# Patient Record
Sex: FEMALE | Race: WHITE | NOT HISPANIC OR LATINO | Employment: FULL TIME | URBAN - METROPOLITAN AREA
[De-identification: names, ages, dates, MRNs, and addresses within clinical notes are randomized per-mention and may not be internally consistent; named-entity substitution may affect disease eponyms.]

---

## 2017-01-25 ENCOUNTER — OFFICE VISIT (OUTPATIENT)
Dept: FAMILY MEDICINE | Facility: CLINIC | Age: 52
End: 2017-01-25
Payer: COMMERCIAL

## 2017-01-25 ENCOUNTER — HOSPITAL ENCOUNTER (OUTPATIENT)
Dept: MAMMOGRAPHY | Facility: CLINIC | Age: 52
Discharge: HOME | End: 2017-01-25
Attending: PHYSICIAN ASSISTANT | Admitting: PHYSICIAN ASSISTANT
Payer: COMMERCIAL

## 2017-01-25 VITALS
DIASTOLIC BLOOD PRESSURE: 60 MMHG | HEART RATE: 72 BPM | SYSTOLIC BLOOD PRESSURE: 104 MMHG | TEMPERATURE: 97.8 F | RESPIRATION RATE: 16 BRPM | HEIGHT: 63 IN

## 2017-01-25 DIAGNOSIS — Z12.31 ENCOUNTER FOR SCREENING MAMMOGRAM FOR BREAST CANCER: ICD-10-CM

## 2017-01-25 DIAGNOSIS — Z00.00 ENCOUNTER FOR GENERAL ADULT MEDICAL EXAMINATION WITHOUT ABNORMAL FINDINGS: Primary | ICD-10-CM

## 2017-01-25 DIAGNOSIS — Z86.19 HISTORY OF COLD SORES: ICD-10-CM

## 2017-01-25 DIAGNOSIS — N94.6 DYSMENORRHEA: ICD-10-CM

## 2017-01-25 PROCEDURE — G0202 SCR MAMMO BI INCL CAD: HCPCS

## 2017-01-25 PROCEDURE — 99396 PREV VISIT EST AGE 40-64: CPT | Performed by: PHYSICIAN ASSISTANT

## 2017-01-25 RX ORDER — FAMCICLOVIR 500 MG/1
500 TABLET ORAL 2 TIMES DAILY
Qty: 180 TABLET | Refills: 3 | Status: SHIPPED | OUTPATIENT
Start: 2017-01-25 | End: 2018-02-12

## 2017-01-25 RX ORDER — IBUPROFEN 800 MG/1
800 TABLET, FILM COATED ORAL EVERY 8 HOURS PRN
Qty: 90 TABLET | Refills: 3 | Status: SHIPPED | OUTPATIENT
Start: 2017-01-25 | End: 2018-02-12

## 2017-01-25 ASSESSMENT — PAIN SCALES - GENERAL: PAINLEVEL: NO PAIN (0)

## 2017-01-25 NOTE — MR AVS SNAPSHOT
After Visit Summary   1/25/2017    Lorraine Hunter    MRN: [id number]           Patient Information     Date Of Birth          1965        Visit Information        Provider Department      1/25/2017 2:00 PM Sana Camara PA-C [facility]        Today's Diagnoses     Encounter for general adult medical examination without abnormal findings    -  1     History of cold sores         Encounter for screening mammogram for breast cancer           Care Instructions      Preventive Health Recommendations  Female Ages 50 - 64    Yearly exam: See your health care provider every year in order to  o Review health changes.   o Discuss preventive care.    o Review your medicines if your doctor has prescribed any.      Get a Pap test every three years (unless you have an abnormal result and your provider advises testing more often).    If you get Pap tests with HPV test, you only need to test every 5 years, unless you have an abnormal result.     You do not need a Pap test if your uterus was removed (hysterectomy) and you have not had cancer.    You should be tested each year for STDs (sexually transmitted diseases) if you're at risk.     Have a mammogram every 1 to 2 years.    Have a colonoscopy at age 50, or have a yearly FIT test (stool test). These exams screen for colon cancer.      Have a cholesterol test every 5 years, or more often if advised.    Have a diabetes test (fasting glucose) every three years. If you are at risk for diabetes, you should have this test more often.     If you are at risk for osteoporosis (brittle bone disease), think about having a bone density scan (DEXA).    Shots: Get a flu shot each year. Get a tetanus shot every 10 years.    Nutrition:     Eat at least 5 servings of fruits and vegetables each day.    Eat whole-grain bread, whole-wheat pasta and brown rice instead of white grains and rice.    Talk to your provider about Calcium and Vitamin D.      Lifestyle    Exercise at least 150 minutes a week (30 minutes a day, 5 days a week). This will help you control your weight and prevent disease.    Limit alcohol to one drink per day.    No smoking.     Wear sunscreen to prevent skin cancer.     See your dentist every six months for an exam and cleaning.    See your eye doctor every 1 to 2 years.            Follow-ups after your visit        Your next 10 appointments already scheduled     Jan 25, 2017  2:45 PM   MA SCREENING DIGITAL BILATERAL with PHMA1   [facility])    [address] [place] [id number]   [phone]           Do not use any powder, lotion or deodorant under your arms or on your breast. If you do, we will ask you to remove it before your exam.  Wear comfortable, two-piece clothing.  If you have any allergies, tell your care team.  Bring any previous mammograms from other facilities or have them mailed to the breast center.              Future tests that were ordered for you today     Open Future Orders        Priority Expected Expires Ordered    *MA Screening Digital Bilateral Routine  1/25/2018 1/25/2017            Who to contact     If you have questions or need follow up information about today's clinic visit or your schedule please contact [facility] directly at [phone].  Normal or non-critical lab and imaging results will be communicated to you by MyChart, letter or phone within 4 business days after the clinic has received the results. If you do not hear from us within 7 days, please contact the clinic through [employer] [employer] or phone. If you have a critical or abnormal lab result, we will notify you by phone as soon as possible.  Submit refill requests through [facility] or call your pharmacy and they will forward the refill request to us. Please allow 3 business days for your refill to be completed.          Additional Information About Your Visit        [facility]  "Information     [email or web address] lets you send messages to your doctor, view your test results, renew your prescriptions, schedule appointments and more. To sign up, go to [email or web address] . Click on \"Log in\" on the left side of the screen, which will take you to the Welcome page. Then click on \"Sign up Now\" on the right side of the page.     You will be asked to enter the access code listed below, as well as some personal information. Please follow the directions to create your username and password.     Your access code is: [email or web address]  Expires: 2017  2:35 PM     Your access code will  in 90 days. If you need help or a new code, please call your [place] [facility] clinic or [phone].        Care EveryWhere ID     This is your [facility] EveryWhere ID. This could be used by other organizations to access your [place] [facility] medical records  [id number]        Your Vitals Were     Pulse Temperature Respirations Height          72 97.8  F (36.6  C) (Tympanic) 16 5' 3.2\" (1.605 m)         Blood Pressure from Last 3 Encounters:   17 104/60   10/07/16 147/92   11/05/15 110/60    Weight from Last 3 Encounters:   11/05/15 133 lb (60.328 kg)   13 128 lb (58.06 kg)   13 128 lb (58.06 kg)                 Today's Medication Changes          These changes are accurate as of: 17  2:35 PM.  If you have any questions, ask your nurse or doctor.               These medicines have changed or have updated prescriptions.        Dose/Directions    famciclovir 500 MG tablet   Commonly known as:  FAMVIR   This may have changed:  See the new instructions.   Used for:  History of cold sores   Changed by:  Sana Camara PA-C        Dose:  500 mg   Take 1 tablet (500 mg) by mouth 2 times daily   Quantity:  180 tablet   Refills:  3            Where to get your medicines      These medications were sent to [facility], [place] - [address]  [address] [address]     Phone:  " [phone]    - famciclovir 500 MG tablet             Primary Care Provider Office Phone # Fax #    Sana Camara PA-C [phone]       [address] [place] [address]        Thank you!     Thank you for choosing [facility]  for your care. Our goal is always to provide you with excellent care. Hearing back from our patients is one way we can continue to improve our services. Please take a few minutes to complete the written survey that you may receive in the mail after your visit with us. Thank you!             Your Updated Medication List - Protect others around you: Learn how to safely use, store and throw away your medicines at www.disposemymeds.org.          This list is accurate as of: 1/25/17  2:35 PM.  Always use your most recent med list.                   Brand Name Dispense Instructions for use    famciclovir 500 MG tablet    FAMVIR    180 tablet    Take 1 tablet (500 mg) by mouth 2 times daily       ibuprofen 800 MG tablet    ADVIL/MOTRIN    90 tablet    Take 1 tablet (800 mg) by mouth every 8 hours as needed for moderate pain

## 2017-01-25 NOTE — PROGRESS NOTES
SUBJECTIVE:     CC: Lorraine Hunter is an 51 year old woman who presents for preventive health visit.     Healthy Habits:    Do you get at least three servings of calcium containing foods daily (dairy, green leafy vegetables, etc.)? No    Amount of exercise or daily activities, outside of work: 3-4 day(s) per week    Problems taking medications regularly No    Medication side effects: No    Have you had an eye exam in the past two years? no    Do you see a dentist twice per year? yes    Do you have sleep apnea, excessive snoring or daytime drowsiness?no            Today's PHQ-2 Score:   PHQ-2 ( 1999 [employer]) 1/25/2017 11/5/2015   Q1: Little interest or pleasure in doing things 0 0   Q2: Feeling down, depressed or hopeless 0 0   PHQ-2 Score 0 0       Abuse: Current or Past(Physical, Sexual or Emotional)- No  Do you feel safe in your environment - Yes    Social History   Substance Use Topics     Smoking status: Former Smoker -- 0.50 packs/day     Types: Cigarettes     Quit date: 12/27/2012     Smokeless tobacco: Never Used      Comment: trying Chantix     Alcohol Use: 6.0 oz/week      Comment: 6 drinks per week     The patient does not drink >3 drinks per day nor >7 drinks per week.    Recent Labs   Lab Test  07/31/14   0806  06/18/12   1835   CHOL  193  184   HDL  91  87   LDL  92  85   TRIG  48  64   CHOLHDLRATIO  2.1  2.0       Reviewed orders with patient.  Reviewed health maintenance and updated orders accordingly - Yes    Mammo Decision Support:  Patient over age 50, mutual decision to screen reflected in health maintenance.    Pertinent mammograms are reviewed under the imaging tab.  History of abnormal Pap smear:   Last 3 Pap Results:   PAP (no units)   Date Value   11/05/2015 NIL   06/18/2012 NIL   12/17/2009 NIL     All Histories reviewed and updated in Epic.  Past Medical History   Diagnosis Date     ABNORMAL PAP SMEAR OF CERVIX NEC AND HPV 9/6/2007     HPV - high risk - sent colp     HSV-1 infection       cold sores on lip     Adenomatous polyp of colon 7/2014     q 5 yr colonoscopys     Past Surgical History   Procedure Laterality Date     No history of surgery       Dilation and curettage, hysteroscopy, ablate endometrium novasure, combined  6/26/2012     Procedure: COMBINED DILATION AND CURETTAGE, HYSTEROSCOPY, ABLATE ENDOMETRIUM NOVASURE;  Hysteroscopy Dilation and Curettage, Novasure;  Surgeon: Bola Calhoun MD;  Location: [facility] OR     Colonoscopy  7/11/2014     Procedure: COMBINED COLONOSCOPY, SINGLE BIOPSY/POLYPECTOMY BY BIOPSY;  Surgeon: Mendoza Clifford MD;  Location:  GI     Social History   Substance Use Topics     Smoking status: Former Smoker -- 0.50 packs/day     Types: Cigarettes     Quit date: 12/27/2012     Smokeless tobacco: Never Used      Comment: trying Chantix     Alcohol Use: 6.0 oz/week      Comment: 6 drinks per week     Family History   Problem Relation Age of Onset     Family History Negative No family hx of       No Known Allergies  Current Outpatient Prescriptions   Medication Sig Dispense Refill     famciclovir (FAMVIR) 500 MG tablet Take 1 tablet (500 mg) by mouth 2 times daily 180 tablet 3     ibuprofen (ADVIL/MOTRIN) 800 MG tablet Take 1 tablet (800 mg) by mouth every 8 hours as needed for moderate pain 90 tablet 3     [DISCONTINUED] famciclovir (FAMVIR) 500 MG tablet TAKE ONE TABLET BY MOUTH TWICE A  tablet 0             ROS:  C: NEGATIVE for fever, chills, change in weight  I: NEGATIVE for worrisome rashes, moles or lesions  E: NEGATIVE for vision changes or irritation  ENT: NEGATIVE for ear, mouth and throat problems  R: NEGATIVE for significant cough or SOB  B: NEGATIVE for masses, tenderness or discharge  CV: NEGATIVE for chest pain, palpitations or peripheral edema  GI: NEGATIVE for nausea, abdominal pain, heartburn, or change in bowel habits  : NEGATIVE for unusual urinary or vaginal symptoms. No vaginal bleeding.  M: NEGATIVE for significant  "arthralgias or myalgia  N: NEGATIVE for weakness, dizziness or paresthesias  P: NEGATIVE for changes in mood or affect     Problem list, Medication list, Allergies, and Medical/Social/Surgical histories reviewed in [facility] and updated as appropriate.  OBJECTIVE:     /60 mmHg  Pulse 72  Temp(Src) 97.8  F (36.6  C) (Tympanic)  Resp 16  Ht 5' 3.2\" (1.605 m)  Wt   EXAM:  GENERAL: healthy, alert and no distress  EYES: Eyes grossly normal to inspection, PERRL and conjunctivae and sclerae normal  HENT: ear canals and TM's normal, nose and mouth without ulcers or lesions  NECK: no adenopathy, no asymmetry, masses, or scars and thyroid normal to palpation  RESP: lungs clear to auscultation - no rales, rhonchi or wheezes  BREAST: normal without masses, tenderness or nipple discharge and no palpable axillary masses or adenopathy  CV: regular rate and rhythm, normal S1 S2, no S3 or S4, no murmur, click or rub, no peripheral edema and peripheral pulses strong  ABDOMEN: soft, nontender, no hepatosplenomegaly, no masses and bowel sounds normal   (female): deferred  MS: no gross musculoskeletal defects noted, no edema  SKIN: no suspicious lesions or rashes  NEURO: Normal strength and tone, mentation intact and speech normal  PSYCH: mentation appears normal, affect normal/bright    ASSESSMENT/PLAN:         ICD-10-CM    1. Encounter for general adult medical examination without abnormal findings Z00.00    2. History of cold sores Z86.19 famciclovir (FAMVIR) 500 MG tablet   3. Encounter for screening mammogram for breast cancer Z12.31 *MA Screening Digital Bilateral   4. Dysmenorrhea N94.6 ibuprofen (ADVIL/MOTRIN) 800 MG tablet       COUNSELING:   Reviewed preventive health counseling, as reflected in patient instructions       Regular exercise       Healthy diet/nutrition       Vision screening       Osteoporosis Prevention/Bone Health       Consider Hep C screening for patients born between 1945 and 1965 - she states she'd " "prefer to do this next year.         reports that she quit smoking about 4 years ago. Her smoking use included Cigarettes. She smoked 0.50 packs per day. She has never used smokeless tobacco.    Estimated body mass index is 23.42 kg/(m^2) as calculated from the following:    Height as of this encounter: 5' 3.2\" (1.605 m).    Weight as of 11/5/15: 133 lb (60.328 kg).       Counseling Resources:  ATP IV Guidelines  Pooled Cohorts Equation Calculator  Breast Cancer Risk Calculator  FRAX Risk Assessment  ICSI Preventive Guidelines  Dietary Guidelines for Americans, 2010  [employer]'s MyPlate  ASA Prophylaxis  Lung CA Screening    Sana Camara PA-C  [facility]    Orders Placed This Encounter     *MA Screening Digital Bilateral     famciclovir (FAMVIR) 500 MG tablet     ibuprofen (ADVIL/MOTRIN) 800 MG tablet       Chart documentation done in part with Dragon Voice recognition Software. Although reviewed after completion, some word and grammatical error may remain.  AVS given to patient upon discharge today.  Electronically signed by Sana Camara PA-C  January 25, 2017  4:28 PM        "

## 2017-01-25 NOTE — NURSING NOTE
"Chief Complaint   Patient presents with     Physical       Initial /60 mmHg  Pulse 72  Temp(Src) 97.8  F (36.6  C) (Tympanic)  Resp 16  Ht 5' 3.2\" (1.605 m)  Wt  Estimated body mass index is 23.42 kg/(m^2) as calculated from the following:    Height as of this encounter: 5' 3.2\" (1.605 m).    Weight as of 11/5/15: 133 lb (60.328 kg).  BP completed using cuff size: regular  Tiffanie Lyle CMA (AAMA)   "

## 2017-02-16 ENCOUNTER — OFFICE VISIT (OUTPATIENT)
Dept: FAMILY MEDICINE | Facility: OTHER | Age: 52
End: 2017-02-16
Payer: COMMERCIAL

## 2017-02-16 VITALS
RESPIRATION RATE: 16 BRPM | HEART RATE: 96 BPM | SYSTOLIC BLOOD PRESSURE: 106 MMHG | DIASTOLIC BLOOD PRESSURE: 64 MMHG | TEMPERATURE: 97.4 F | OXYGEN SATURATION: 98 %

## 2017-02-16 DIAGNOSIS — M79.605 PAIN OF LEFT LOWER EXTREMITY: ICD-10-CM

## 2017-02-16 DIAGNOSIS — M25.572 LEFT LATERAL ANKLE PAIN: Primary | ICD-10-CM

## 2017-02-16 PROCEDURE — 99213 OFFICE O/P EST LOW 20 MIN: CPT | Performed by: PHYSICIAN ASSISTANT

## 2017-02-16 ASSESSMENT — PAIN SCALES - GENERAL: PAINLEVEL: EXTREME PAIN (9)

## 2017-02-16 NOTE — PATIENT INSTRUCTIONS
Ankle Sprain Rehabilitation Exercises   As soon as you can tolerate pressure on the ball of your foot, begin stretching your ankle using the towel stretch. When this stretch is too easy, try the standing calf stretch and soleus stretch.     Towel stretch: Sit on a hard surface with your injured leg stretched out in front of you. Loop a towel around the ball of your foot and pull the towel toward your body keeping your knee straight. Hold this position for 15 to 30 seconds then relax. Repeat 3 times.     Standing calf stretch: Facing a wall, put your hands against the wall at about eye level. Keep the injured leg back, the uninjured leg forward, and the heel of your injured leg on the floor. Turn your injured foot slightly inward (as if you were pigeon-toed) as you slowly lean into the wall until you feel a stretch in the back of your calf. Hold for 15 to 30 seconds. Repeat 3 times. Do this exercise several times each day.     Standing soleus stretch: Stand facing a wall with your hands at about chest level. With both knees slightly bent and the injured foot back, gently lean into the wall until you feel a stretch in your lower calf. Once again, angle the toes of your injured foot slightly inward and keep your heel down on the floor. Hold this for 15 to 30 seconds. Return to the starting position. Repeat 3 times.   You can do the next 5 exercises when your ankle swelling has stopped increasing.     Ankle range of motion: Sitting or lying down with your legs straight and your knee toward the ceiling, move your ankle up and down, in and out, and in circles. Only move your ankle. Don't move your leg. Repeat 10 times in each direction. Push hard in all directions.     Resisted dorsiflexion: Sit with your injured leg out straight and your foot facing a doorway. Tie a loop in one end of the tubing. Put your foot through the loop so that the tubing goes around the arch of your foot. Tie a knot in the other end  of the tubing and shut the knot in the door. Move backward until there is tension in the tubing. Keeping your knee straight, pull your foot toward your body, stretching the tubing. Slowly return to the starting position. Do 3 sets of 10.     Resisted plantar flexion: Sit with your leg outstretched and loop the middle section of the tubing around the ball of your foot. Hold the ends of the tubing in both hands. Gently press the ball of your foot down and point your toes, stretching the tubing. Return to the starting position. Do 3 sets of 10.     Resisted inversion: Sit with your legs out straight and cross your uninjured leg over your injured ankle. Wrap the tubing around the ball of your injured foot and then loop it around your uninjured foot so that the tubing is anchored there at one end. Hold the other end of the tubing in your hand. Turn your injured foot inward and upward. This will stretch the tubing. Return to the starting position. Do 3 sets of 10.     Resisted eversion: Sit with both legs stretched out in front of you, with your feet about a shoulder's width apart. Tie a loop in one end of the tubing. Put your injured foot through the loop so that the tubing goes around the arch of that foot and wraps around the outside of the uninjured foot. Hold onto the other end of the tubing with your hand to provide tension. Turn your injured foot up and out. Make sure you keep your uninjured foot still so that it will allow the tubing to stretch as you move your injured foot. Return to the starting position. Do 3 sets of 10.   You may do the rest of the exercises when you can stand on your injured ankle without pain.     Heel raises: Balance yourself while standing behind a chair or counter. Raise your body up onto your toes and hold it for 5 seconds, then slowly lower yourself down. Repeat 10 times. Do 3 sets of 10.     Step-up: Stand with the foot of your injured leg on a support (like a block of wood) 3 to 5  inches high. Keep your other foot flat on the floor. Shift your weight onto the injured leg and straighten the knee as the uninjured leg comes off the floor. Lower your uninjured leg to the floor slowly. Do 3 sets of 10.     Static and dynamic balance exercises   A. Place a chair next to your non-injured leg and stand upright. (This will provide you with balance if needed.) Stand on your injured foot. Try to raise the arch of your foot while keeping your toes on the floor. Try to maintain this position and balance on your injured side for 30 seconds. This exercise can be made more difficult by doing it on a piece of foam or a pillow, or with your eyes closed.   B.  the same position as above. Keep your foot in this position and reach forward in front of you with your injured side's hand, allowing your knee to bend. Repeat this 10 times while maintaining the arch height. This exercise can be made more difficult by reaching farther in front of you. Do 2 sets.   C.  the same position as above. While maintaining your arch height, reach the injured side's hand across your body toward the chair. The farther you reach, the more challenging the exercise. Do 2 sets of 10.     Jump rope: Jump rope landing on both legs for 5 minutes, then on only the injured leg for 5 minutes.     Written by Karla Chua M.S., P.T., for [employer].   Published by [employer].   This content is reviewed periodically and is subject to change as new health information becomes available. The information is intended to inform and educate and is not a replacement for medical evaluation, advice, diagnosis or treatment by a healthcare professional.   Sports Medicine Advisor 2003.1 Index  Sports Medicine Advisor 2003.1 Credits   Copyright   2003 [employer]. All rights reserved.   Page footer image

## 2017-02-16 NOTE — PROGRESS NOTES
SUBJECTIVE:                                                    Lorraine Hunter is a 51 year old female who presents to clinic today for the following health issues:    Joint Pain     Onset: 2.5 weeks     Description:   Location: left ankle/foot  Character: Sharp    Intensity: severe    Progression of Symptoms: worse    Accompanying Signs & Symptoms:  Other symptoms: none   History:   Previous similar pain: no       Precipitating factors:   Trauma or overuse: YES    Alleviating factors:  Improved by: ice and NSAID       Therapies Tried and outcome: ibuprofen and ice, helps temporary    Problem list and histories reviewed & adjusted, as indicated.  Additional history: as documented    Patient Active Problem List   Diagnosis     Diffuse cystic mastopathy     ABNORMAL PAP SMEAR OF CERVIX NEC AND HPV     CARDIOVASCULAR SCREENING; LDL GOAL LESS THAN 160     Adenomatous polyp of colon     History of cold sores     Past Surgical History   Procedure Laterality Date     No history of surgery       Dilation and curettage, hysteroscopy, ablate endometrium novasure, combined  6/26/2012     Procedure: COMBINED DILATION AND CURETTAGE, HYSTEROSCOPY, ABLATE ENDOMETRIUM NOVASURE;  Hysteroscopy Dilation and Curettage, Novasure;  Surgeon: Bola Calhoun MD;  Location:  OR     Colonoscopy  7/11/2014     Procedure: COMBINED COLONOSCOPY, SINGLE BIOPSY/POLYPECTOMY BY BIOPSY;  Surgeon: Mendoza Clifford MD;  Location:  GI       Social History   Substance Use Topics     Smoking status: Former Smoker     Packs/day: 0.50     Types: Cigarettes     Quit date: 12/27/2012     Smokeless tobacco: Never Used      Comment: trying Chantix     Alcohol use 6.0 oz/week      Comment: 6 drinks per week     Family History   Problem Relation Age of Onset     Family History Negative No family hx of            ROS:  Constitutional, HEENT, cardiovascular, pulmonary, gi and gu systems are negative, except as otherwise noted.    OBJECTIVE:                                                     /64 (BP Location: Right arm, Patient Position: Chair, Cuff Size: Adult Regular)  Pulse 96  Temp 97.4  F (36.3  C) (Oral)  Resp 16  SpO2 98%  Breastfeeding? No  There is no height or weight on file to calculate BMI.  GENERAL: healthy, alert and no distress  MS: no gross musculoskeletal defects noted, no edema  SKIN: no suspicious lesions or rashes  NEURO: Normal strength and tone, mentation intact and speech normal  PSYCH: mentation appears normal, affect normal/bright    Diagnostic Test Results:  No results found for this or any previous visit (from the past 24 hour(s)).     ASSESSMENT/PLAN:                                                    1. Left lateral ankle pain  2. Pain of left lower extremity  She declines an x-ray or any type of bracing / splinting / ACE wrap at this time.  Advised F/U in 4-6 weeks if the discomfort remains    Work on weight loss  Regular exercise  Ankle sprain exercises encouraged.  Latrell Westfall PA-C  [facility]

## 2017-02-16 NOTE — NURSING NOTE
"Chief Complaint   Patient presents with     Ankle/Foot left     x2.5 weeks, walked off a curb and twisted ankle       Initial /64 (BP Location: Right arm, Patient Position: Chair, Cuff Size: Adult Regular)  Pulse 96  Temp 97.4  F (36.3  C) (Oral)  Resp 16  SpO2 98%  Breastfeeding? No Estimated body mass index is 23.41 kg/(m^2) as calculated from the following:    Height as of 11/5/15: 5' 3.2\" (1.605 m).    Weight as of 11/5/15: 133 lb (60.3 kg).  Medication Reconciliation: complete     Patient refused weight today.    Health Maintenance Due   Topic Date Due     HEPATITIS C SCREENING  12/27/1981     INFLUENZA VACCINE (SYSTEM ASSIGNED)  09/01/2016     Zak Ivan [facility]      "

## 2017-02-16 NOTE — MR AVS SNAPSHOT
After Visit Summary   2/16/2017    Lorraine Hunter    MRN: [id number]           Patient Information     Date Of Birth          1965        Visit Information        Provider Department      2/16/2017 2:40 PM Latrell Patel PA-C [facility]        Today's Diagnoses     Left lateral ankle pain    -  1    Pain of left lower extremity          Care Instructions                  Ankle Sprain Rehabilitation Exercises   As soon as you can tolerate pressure on the ball of your foot, begin stretching your ankle using the towel stretch. When this stretch is too easy, try the standing calf stretch and soleus stretch.     Towel stretch: Sit on a hard surface with your injured leg stretched out in front of you. Loop a towel around the ball of your foot and pull the towel toward your body keeping your knee straight. Hold this position for 15 to 30 seconds then relax. Repeat 3 times.     Standing calf stretch: Facing a wall, put your hands against the wall at about eye level. Keep the injured leg back, the uninjured leg forward, and the heel of your injured leg on the floor. Turn your injured foot slightly inward (as if you were pigeon-toed) as you slowly lean into the wall until you feel a stretch in the back of your calf. Hold for 15 to 30 seconds. Repeat 3 times. Do this exercise several times each day.     Standing soleus stretch: Stand facing a wall with your hands at about chest level. With both knees slightly bent and the injured foot back, gently lean into the wall until you feel a stretch in your lower calf. Once again, angle the toes of your injured foot slightly inward and keep your heel down on the floor. Hold this for 15 to 30 seconds. Return to the starting position. Repeat 3 times.   You can do the next 5 exercises when your ankle swelling has stopped increasing.     Ankle range of motion: Sitting or lying down with your legs straight and your knee toward the ceiling, move your ankle  up and down, in and out, and in circles. Only move your ankle. Don't move your leg. Repeat 10 times in each direction. Push hard in all directions.     Resisted dorsiflexion: Sit with your injured leg out straight and your foot facing a doorway. Tie a loop in one end of the tubing. Put your foot through the loop so that the tubing goes around the arch of your foot. Tie a knot in the other end of the tubing and shut the knot in the door. Move backward until there is tension in the tubing. Keeping your knee straight, pull your foot toward your body, stretching the tubing. Slowly return to the starting position. Do 3 sets of 10.     Resisted plantar flexion: Sit with your leg outstretched and loop the middle section of the tubing around the ball of your foot. Hold the ends of the tubing in both hands. Gently press the ball of your foot down and point your toes, stretching the tubing. Return to the starting position. Do 3 sets of 10.     Resisted inversion: Sit with your legs out straight and cross your uninjured leg over your injured ankle. Wrap the tubing around the ball of your injured foot and then loop it around your uninjured foot so that the tubing is anchored there at one end. Hold the other end of the tubing in your hand. Turn your injured foot inward and upward. This will stretch the tubing. Return to the starting position. Do 3 sets of 10.     Resisted eversion: Sit with both legs stretched out in front of you, with your feet about a shoulder's width apart. Tie a loop in one end of the tubing. Put your injured foot through the loop so that the tubing goes around the arch of that foot and wraps around the outside of the uninjured foot. Hold onto the other end of the tubing with your hand to provide tension. Turn your injured foot up and out. Make sure you keep your uninjured foot still so that it will allow the tubing to stretch as you move your injured foot. Return to the starting position. Do 3 sets of 10.    You may do the rest of the exercises when you can stand on your injured ankle without pain.     Heel raises: Balance yourself while standing behind a chair or counter. Raise your body up onto your toes and hold it for 5 seconds, then slowly lower yourself down. Repeat 10 times. Do 3 sets of 10.     Step-up: Stand with the foot of your injured leg on a support (like a block of wood) 3 to 5 inches high. Keep your other foot flat on the floor. Shift your weight onto the injured leg and straighten the knee as the uninjured leg comes off the floor. Lower your uninjured leg to the floor slowly. Do 3 sets of 10.     Static and dynamic balance exercises   A. Place a chair next to your non-injured leg and stand upright. (This will provide you with balance if needed.) Stand on your injured foot. Try to raise the arch of your foot while keeping your toes on the floor. Try to maintain this position and balance on your injured side for 30 seconds. This exercise can be made more difficult by doing it on a piece of foam or a pillow, or with your eyes closed.   B.  the same position as above. Keep your foot in this position and reach forward in front of you with your injured side's hand, allowing your knee to bend. Repeat this 10 times while maintaining the arch height. This exercise can be made more difficult by reaching farther in front of you. Do 2 sets.   C.  the same position as above. While maintaining your arch height, reach the injured side's hand across your body toward the chair. The farther you reach, the more challenging the exercise. Do 2 sets of 10.     Jump rope: Jump rope landing on both legs for 5 minutes, then on only the injured leg for 5 minutes.     Written by Karla Chua M.S., P.T., for [employer].   Published by [employer].   This content is reviewed periodically and is subject to change as new health information becomes available. The information is  "intended to inform and educate and is not a replacement for medical evaluation, advice, diagnosis or treatment by a healthcare professional.   Sports Medicine Advisor 2003.1 Index  Sports Medicine Advisor 2003.1 Credits   Copyright   2003 [employer]. All rights reserved.   Page footer image                      Follow-ups after your visit        Who to contact     If you have questions or need follow up information about today's clinic visit or your schedule please contact [facility] directly at [phone].  Normal or non-critical lab and imaging results will be communicated to you by MyChart, letter or phone within 4 business days after the clinic has received the results. If you do not hear from us within 7 days, please contact the clinic through MyChart or phone. If you have a critical or abnormal lab result, we will notify you by phone as soon as possible.  Submit refill requests through [employer] or call your pharmacy and they will forward the refill request to us. Please allow 3 business days for your refill to be completed.          Additional Information About Your Visit        [employer] Information     [employer] lets you send messages to your doctor, view your test results, renew your prescriptions, schedule appointments and more. To sign up, go to [email or web address] . Click on \"Log in\" on the left side of the screen, which will take you to the Welcome page. Then click on \"Sign up Now\" on the right side of the page.     You will be asked to enter the access code listed below, as well as some personal information. Please follow the directions to create your username and password.     Your access code is: [email or web address]  Expires: 2017  2:35 PM     Your access code will  in 90 days. If you need help or a new code, please call your [facility] or [phone].        Care EveryWhere ID     This is your Care EveryWhere ID. This could be used by other organizations to " access your [facility] medical records  [id number]        Your Vitals Were     Pulse Temperature Respirations Pulse Oximetry Breastfeeding?       96 97.4  F (36.3  C) (Oral) 16 98% No        Blood Pressure from Last 3 Encounters:   02/16/17 106/64   01/25/17 104/60   10/07/16 (!) 147/92    Weight from Last 3 Encounters:   11/05/15 133 lb (60.3 kg)   09/04/13 128 lb (58.1 kg)   08/16/13 128 lb (58.1 kg)              Today, you had the following     No orders found for display       Primary Care Provider Office Phone # Fax #    Sana Camara PA-C [phone]       [address] [place] [address]        Thank you!     Thank you for choosing [facility]  for your care. Our goal is always to provide you with excellent care. Hearing back from our patients is one way we can continue to improve our services. Please take a few minutes to complete the written survey that you may receive in the mail after your visit with us. Thank you!             Your Updated Medication List - Protect others around you: Learn how to safely use, store and throw away your medicines at www.disposemymeds.org.          This list is accurate as of: 2/16/17  2:56 PM.  Always use your most recent med list.                   Brand Name Dispense Instructions for use    famciclovir 500 MG tablet    FAMVIR    180 tablet    Take 1 tablet (500 mg) by mouth 2 times daily       ibuprofen 800 MG tablet    ADVIL/MOTRIN    90 tablet    Take 1 tablet (800 mg) by mouth every 8 hours as needed for moderate pain

## 2018-02-12 ENCOUNTER — TELEPHONE (OUTPATIENT)
Dept: FAMILY MEDICINE | Facility: CLINIC | Age: 53
End: 2018-02-12

## 2018-02-12 DIAGNOSIS — Z86.19 HISTORY OF COLD SORES: ICD-10-CM

## 2018-02-12 DIAGNOSIS — N94.6 DYSMENORRHEA: ICD-10-CM

## 2018-02-12 NOTE — TELEPHONE ENCOUNTER
ibuprofen    Last Written Prescription Date:  01/25/17  Last Fill Quantity: 180,  # refills: 3   Last office visit: 2/16/2017 with prescribing provider:  01/25/17.    Future Office Visit:      famciclovir  Last Written Prescription Date:  01/25/17  Last Fill Quantity: 90,  # refills: 3   Last office visit: 2/16/2017 with prescribing provider:  01/25/17   Future Office Visit:

## 2018-02-14 RX ORDER — IBUPROFEN 800 MG/1
800 TABLET, FILM COATED ORAL EVERY 8 HOURS PRN
Qty: 90 TABLET | Refills: 0 | Status: SHIPPED | OUTPATIENT
Start: 2018-02-14 | End: 2018-06-19

## 2018-02-14 RX ORDER — FAMCICLOVIR 500 MG/1
500 TABLET ORAL 2 TIMES DAILY
Qty: 180 TABLET | Refills: 0 | Status: SHIPPED | OUTPATIENT
Start: 2018-02-14 | End: 2018-03-21

## 2018-02-14 NOTE — TELEPHONE ENCOUNTER
She is overdue for yearly visit - will give 3 months of med for now, but she will need an appt.  Electronically signed by Sana Camara PA-C  2/14/2018

## 2018-02-14 NOTE — TELEPHONE ENCOUNTER
Patient calling, states she will be out of this Rx is a couple day, would like to know if it can be ready by then.

## 2018-03-21 ENCOUNTER — OFFICE VISIT (OUTPATIENT)
Dept: FAMILY MEDICINE | Facility: CLINIC | Age: 53
End: 2018-03-21
Payer: COMMERCIAL

## 2018-03-21 VITALS
BODY MASS INDEX: 21.79 KG/M2 | RESPIRATION RATE: 18 BRPM | SYSTOLIC BLOOD PRESSURE: 118 MMHG | DIASTOLIC BLOOD PRESSURE: 78 MMHG | HEART RATE: 91 BPM | TEMPERATURE: 99.1 F | HEIGHT: 63 IN | WEIGHT: 123 LBS | OXYGEN SATURATION: 98 %

## 2018-03-21 DIAGNOSIS — Z00.00 ENCOUNTER FOR GENERAL ADULT MEDICAL EXAMINATION WITHOUT ABNORMAL FINDINGS: Primary | ICD-10-CM

## 2018-03-21 DIAGNOSIS — Z86.19 HISTORY OF COLD SORES: ICD-10-CM

## 2018-03-21 PROCEDURE — 99396 PREV VISIT EST AGE 40-64: CPT | Performed by: PHYSICIAN ASSISTANT

## 2018-03-21 RX ORDER — FAMCICLOVIR 500 MG/1
500 TABLET ORAL 2 TIMES DAILY
Qty: 180 TABLET | Refills: 3 | Status: SHIPPED | OUTPATIENT
Start: 2018-03-21 | End: 2018-04-10

## 2018-03-21 ASSESSMENT — PAIN SCALES - GENERAL: PAINLEVEL: NO PAIN (0)

## 2018-03-21 NOTE — NURSING NOTE
"Chief Complaint   Patient presents with     Physical       Initial /78  Pulse 91  Temp 99.1  F (37.3  C) (Tympanic)  Resp 18  Ht 5' 3.25\" (1.607 m)  Wt 123 lb (55.8 kg)  SpO2 98%  BMI 21.62 kg/m2 Estimated body mass index is 21.62 kg/(m^2) as calculated from the following:    Height as of this encounter: 5' 3.25\" (1.607 m).    Weight as of this encounter: 123 lb (55.8 kg).  BP completed using cuff size: jadiel Guevara MA      "

## 2018-03-21 NOTE — MR AVS SNAPSHOT
After Visit Summary   3/21/2018    Lorraine Hunter    MRN: [id number]           Patient Information     Date Of Birth          1965        Visit Information        Provider Department      3/21/2018 4:15 PM Sana Camara PA-C [facility]        Today's Diagnoses     History of cold sores          Care Instructions      Preventive Health Recommendations  Female Ages 50 - 64    Yearly exam: See your health care provider every year in order to  o Review health changes.   o Discuss preventive care.    o Review your medicines if your doctor has prescribed any.      Get a Pap test every three years (unless you have an abnormal result and your provider advises testing more often).    If you get Pap tests with HPV test, you only need to test every 5 years, unless you have an abnormal result.     You do not need a Pap test if your uterus was removed (hysterectomy) and you have not had cancer.    You should be tested each year for STDs (sexually transmitted diseases) if you're at risk.     Have a mammogram every 1 to 2 years.    Have a colonoscopy at age 50, or have a yearly FIT test (stool test). These exams screen for colon cancer.      Have a cholesterol test every 5 years, or more often if advised.    Have a diabetes test (fasting glucose) every three years. If you are at risk for diabetes, you should have this test more often.     If you are at risk for osteoporosis (brittle bone disease), think about having a bone density scan (DEXA).    Shots: Get a flu shot each year. Get a tetanus shot every 10 years.    Nutrition:     Eat at least 5 servings of fruits and vegetables each day.    Eat whole-grain bread, whole-wheat pasta and brown rice instead of white grains and rice.    Talk to your provider about Calcium and Vitamin D.     Lifestyle    Exercise at least 150 minutes a week (30 minutes a day, 5 days a week). This will help you control your weight and prevent  disease.    Limit alcohol to one drink per day.    No smoking.     Wear sunscreen to prevent skin cancer.     See your dentist every six months for an exam and cleaning.    See your eye doctor every 1 to 2 years.      Preventive Health Recommendations  Female Ages 50 - 64    Yearly exam: See your health care provider every year in order to  o Review health changes.   o Discuss preventive care.    o Review your medicines if your doctor has prescribed any.      Get a Pap test every three years (unless you have an abnormal result and your provider advises testing more often).    If you get Pap tests with HPV test, you only need to test every 5 years, unless you have an abnormal result.     You do not need a Pap test if your uterus was removed (hysterectomy) and you have not had cancer.    You should be tested each year for STDs (sexually transmitted diseases) if you're at risk.     Have a mammogram every 1 to 2 years.    Have a colonoscopy at age 50, or have a yearly FIT test (stool test). These exams screen for colon cancer.      Have a cholesterol test every 5 years, or more often if advised.    Have a diabetes test (fasting glucose) every three years. If you are at risk for diabetes, you should have this test more often.     If you are at risk for osteoporosis (brittle bone disease), think about having a bone density scan (DEXA).    Shots: Get a flu shot each year. Get a tetanus shot every 10 years.    Nutrition:     Eat at least 5 servings of fruits and vegetables each day.    Eat whole-grain bread, whole-wheat pasta and brown rice instead of white grains and rice.    Talk to your provider about Calcium and Vitamin D.     Lifestyle    Exercise at least 150 minutes a week (30 minutes a day, 5 days a week). This will help you control your weight and prevent disease.    Limit alcohol to one drink per day.    No smoking.     Wear sunscreen to prevent skin cancer.     See your dentist every six months for an exam and  "cleaning.    See your eye doctor every 1 to 2 years.            Follow-ups after your visit        Who to contact     If you have questions or need follow up information about today's clinic visit or your schedule please contact [facility] directly at [phone].  Normal or non-critical lab and imaging results will be communicated to you by MyChart, letter or phone within 4 business days after the clinic has received the results. If you do not hear from us within 7 days, please contact the clinic through [employer] or phone. If you have a critical or abnormal lab result, we will notify you by phone as soon as possible.  Submit refill requests through mSnap or call your pharmacy and they will forward the refill request to us. Please allow 3 business days for your refill to be completed.          Additional Information About Your Visit        [employer] Information     [employer]Snap lets you send messages to your doctor, view your test results, renew your prescriptions, schedule appointments and more. To sign up, go to [email or web address] . Click on \"Log in\" on the left side of the screen, which will take you to the Welcome page. Then click on \"Sign up Now\" on the right side of the page.     You will be asked to enter the access code listed below, as well as some personal information. Please follow the directions to create your username and password.     Your access code is: [email or web address]  Expires: 2018  4:42 PM     Your access code will  in 90 days. If you need help or a new code, please call your [place] clinic or [phone].        Care EveryWhere ID     This is your Care EveryWhere ID. This could be used by other organizations to access your [facility] medical records  [id number]        Your Vitals Were     Pulse Temperature Respirations Height Pulse Oximetry BMI (Body Mass Index)    91 99.1  F (37.3  C) (Tympanic) 18 5' 3.25\" (1.607 m) 98% 21.62 kg/m2       Blood Pressure from Last 3 " Encounters:   03/21/18 118/78   02/16/17 106/64   01/25/17 104/60    Weight from Last 3 Encounters:   03/21/18 123 lb (55.8 kg)   11/05/15 133 lb (60.3 kg)   09/04/13 128 lb (58.1 kg)              Today, you had the following     No orders found for display         Where to get your medicines      These medications were sent to [facility] [address] [place] [address] - [address]  [address], [facility] [address]     Phone:  [phone]     famciclovir 500 MG tablet          Primary Care Provider Office Phone # Fax #    Sana Camara PA-C [phone]       [address] [facility]   [facility] [place] [address]        Equal Access to Services     WARREN HAINES : Hadii aad ku hadasho Soomaali, waaxda luqadaha, qaybta kaalmada adeegyada, mati roque . So [facility] [phone].    ATENCIÓN: Si habla español, tiene a al disposición servicios gratuitos de asistencia lingüística. Llame al [phone].    We comply with applicable federal civil rights laws and [place] laws. We do not discriminate on the basis of race, color, national origin, age, disability, sex, sexual orientation, or gender identity.            Thank you!     Thank you for choosing [facility]  for your care. Our goal is always to provide you with excellent care. Hearing back from our patients is one way we can continue to improve our services. Please take a few minutes to complete the written survey that you may receive in the mail after your visit with us. Thank you!             Your Updated Medication List - Protect others around you: Learn how to safely use, store and throw away your medicines at www.disposemymeds.org.          This list is accurate as of 3/21/18  4:42 PM.  Always use your most recent med list.                   Brand Name Dispense Instructions for use Diagnosis    famciclovir 500 MG tablet    FAMVIR    180 tablet    Take 1 tablet (500 mg) by mouth 2 times daily    History of cold sores        ibuprofen 800 MG tablet    ADVIL/MOTRIN    90 tablet    Take 1 tablet (800 mg) by mouth every 8 hours as needed for moderate pain    Dysmenorrhea

## 2018-03-21 NOTE — PATIENT INSTRUCTIONS

## 2018-03-21 NOTE — PROGRESS NOTES
"   SUBJECTIVE:   CC: Lorraine Hunter is an 52 year old woman who presents for preventive health visit.     Healthy Habits:    Do you get at least three servings of calcium containing foods daily (dairy, green leafy vegetables, etc.)? yes    Amount of exercise or daily activities, outside of work: {AMOUNT EXERCISE:685457}    Problems taking medications regularly {Yes /No default:168581::\"No\"}    Medication side effects: {Yes /No default.:013849::\"No\"}    Have you had an eye exam in the past two years? {YESNOBLANK:651348}    Do you see a dentist twice per year? {YESNOBLANK:133728}    Do you have sleep apnea, excessive snoring or daytime drowsiness?{YESNOBLANK:924473}  {Outside tests to abstract? :128463}    {additional problems to add (Optional):989278}    Today's PHQ-2 Score:   PHQ-2 ( 1999 [employer]) 3/21/2018 2/16/2017   Q1: Little interest or pleasure in doing things 0 0   Q2: Feeling down, depressed or hopeless 0 0   PHQ-2 Score 0 0   Q1: Little interest or pleasure in doing things Not at all -   Q2: Feeling down, depressed or hopeless Not at all -   PHQ-2 Score 0 -     {PHQ-2 LOOK IN ASSESSMENTS (Optional) :532465}  Abuse: Current or Past(Physical, Sexual or Emotional)- {YES/NO/NA:417721}  Do you feel safe in your environment - {YES/NO/NA:880797}    Social History   Substance Use Topics     Smoking status: Former Smoker     Packs/day: 0.50     Types: Cigarettes     Quit date: 12/27/2012     Smokeless tobacco: Never Used      Comment: trying Chantix     Alcohol use 6.0 oz/week      Comment: 6 drinks per week     If you drink alcohol do you typically have >3 drinks per day or >7 drinks per week? {ETOH :957144}                     Reviewed orders with patient.  Reviewed health maintenance and updated orders accordingly - {Yes/No:788937::\"Yes\"}  {Chronicprobdata (Optional):202324}    {Mammo Decision Support (Optional):082672}    Pertinent mammograms are reviewed under the imaging tab.  History of abnormal Pap smear: " "{PAP HX:247890}    Reviewed and updated as needed this visit by clinical staff         Reviewed and updated as needed this visit by Provider        {HISTORY OPTIONS (Optional):682377}    ROS:  {FEMALE PREVENTATIVE ROS:854642}    OBJECTIVE:   There were no vitals taken for this visit.  EXAM:  {Exam Choices:258733}    ASSESSMENT/PLAN:   {Diag Picklist:005642}    COUNSELING:   {FEMALE COUNSELING MESSAGES:423444::\"Reviewed preventive health counseling, as reflected in patient instructions\"}    {BP Counseling- Complete if BP >= 120/80  (Optional):056330}     reports that she quit smoking about 5 years ago. Her smoking use included Cigarettes. She smoked 0.50 packs per day. She has never used smokeless tobacco.  {Tobacco Cessation -- Complete if patient is a smoker (Optional):980076}  Estimated body mass index is 23.41 kg/(m^2) as calculated from the following:    Height as of 11/5/15: 5' 3.2\" (1.605 m).    Weight as of 11/5/15: 133 lb (60.3 kg).   {Weight Management Plan (ACO) Complete if BMI is abnormal-  Ages 18-64  BMI >24.9.  Age 65+ with BMI <23 or >30 (Optional):193556}    Counseling Resources:  ATP IV Guidelines  Pooled Cohorts Equation Calculator  Breast Cancer Risk Calculator  FRAX Risk Assessment  ICSI Preventive Guidelines  Dietary Guidelines for Americans, 2010  USDA's MyPlate  ASA Prophylaxis  Lung CA Screening    Sana Camara PA-C  [facility]    Orders Placed This Encounter     famciclovir (FAMVIR) 500 MG tablet       AVS given to patient upon discharge today.  Electronically signed by Sana Camara PA-C  March 21, 2018  5:12 PM      "

## 2018-03-21 NOTE — PROGRESS NOTES
SUBJECTIVE:   CC: Lorraine Hunter is an 52 year old woman who presents for preventive health visit.     Physical   Annual:     Getting at least 3 servings of Calcium per day::  NO    Bi-annual eye exam::  NO    Dental care twice a year::  Yes    Sleep apnea or symptoms of sleep apnea::  None    Diet::  Other    Frequency of exercise::  None    Taking medications regularly::  Yes    Medication side effects::  None    Additional concerns today::  No                    Today's PHQ-2 Score:   PHQ-2 ( 1999 Pfizer) 3/21/2018   Q1: Little interest or pleasure in doing things 0   Q2: Feeling down, depressed or hopeless 0   PHQ-2 Score 0   Q1: Little interest or pleasure in doing things Not at all   Q2: Feeling down, depressed or hopeless Not at all   PHQ-2 Score 0       Abuse: Current or Past(Physical, Sexual or Emotional)- No  Do you feel safe in your environment - Yes    Social History   Substance Use Topics     Smoking status: Former Smoker     Packs/day: 0.50     Types: Cigarettes     Quit date: 12/27/2012     Smokeless tobacco: Never Used      Comment: trying Chantix     Alcohol use 6.0 oz/week      Comment: 6 drinks per week     Alcohol Use 3/21/2018   If you drink alcohol do you typically have greater than 3 drinks per day OR greater than 7 drinks per week? Not Applicable   No flowsheet data found.    Reviewed orders with patient.  Reviewed health maintenance and updated orders accordingly - Yes  Patient Active Problem List   Diagnosis     Diffuse cystic mastopathy     ABNORMAL PAP SMEAR OF CERVIX NEC AND HPV     CARDIOVASCULAR SCREENING; LDL GOAL LESS THAN 160     Adenomatous polyp of colon     History of cold sores     Past Surgical History:   Procedure Laterality Date     COLONOSCOPY  7/11/2014    Procedure: COMBINED COLONOSCOPY, SINGLE BIOPSY/POLYPECTOMY BY BIOPSY;  Surgeon: Mendoza Clifford MD;  Location:  GI     DILATION AND CURETTAGE, HYSTEROSCOPY, ABLATE ENDOMETRIUM NOVASURE, COMBINED  6/26/2012     Procedure: COMBINED DILATION AND CURETTAGE, HYSTEROSCOPY, ABLATE ENDOMETRIUM NOVASURE;  Hysteroscopy Dilation and Curettage, Novasure;  Surgeon: Bola Calhoun MD;  Location: [facility] OR     NO HISTORY OF SURGERY         Social History   Substance Use Topics     Smoking status: Former Smoker     Packs/day: 0.50     Types: Cigarettes     Quit date: 12/27/2012     Smokeless tobacco: Never Used      Comment: trying Chantix     Alcohol use 6.0 oz/week      Comment: 6 drinks per week     Family History   Problem Relation Age of Onset     Family History Negative No family hx of            Patient over age 50, mutual decision to screen reflected in health maintenance.    Pertinent mammograms are reviewed under the imaging tab.  History of abnormal Pap smear: NO - age 30-65 PAP every 5 years with negative HPV co-testing recommended    Reviewed and updated as needed this visit by clinical staff  Allergies  Meds         Reviewed and updated as needed this visit by Provider            Review of Systems  C: NEGATIVE for fever, chills, change in weight  I: NEGATIVE for worrisome rashes, moles or lesions  E: NEGATIVE for vision changes or irritation  ENT: NEGATIVE for ear, mouth and throat problems  R: NEGATIVE for significant cough or SOB  B: NEGATIVE for masses, tenderness or discharge  CV: NEGATIVE for chest pain, palpitations or peripheral edema  GI: NEGATIVE for nausea, abdominal pain, heartburn, or change in bowel habits  : NEGATIVE for unusual urinary or vaginal symptoms. No vaginal bleeding.  M: NEGATIVE for significant arthralgias or myalgia  N: NEGATIVE for weakness, dizziness or paresthesias  E: NEGATIVE for temperature intolerance, skin/hair changes  H: NEGATIVE for bleeding problems  P: NEGATIVE for changes in mood or affect      OBJECTIVE:   There were no vitals taken for this visit.  Physical Exam  GENERAL: healthy, alert and no distress  EYES: Eyes grossly normal to inspection, PERRL and conjunctivae and  sclerae normal  HENT: ear canals and TM's normal, nose and mouth without ulcers or lesions  NECK: no adenopathy, no asymmetry, masses, or scars and thyroid normal to palpation  RESP: lungs clear to auscultation - no rales, rhonchi or wheezes  BREAST: patient declined breast exam    CV: regular rate and rhythm, normal S1 S2, no S3 or S4, no murmur, click or rub, no peripheral edema and peripheral pulses strong  ABDOMEN: soft, nontender, no hepatosplenomegaly, no masses and bowel sounds normal   (female): deferred  MS: no gross musculoskeletal defects noted, no edema  SKIN: no suspicious lesions or rashes  NEURO: Normal strength and tone, mentation intact and speech normal  PSYCH: mentation appears normal, affect normal/bright    ASSESSMENT/PLAN:       ICD-10-CM    1. Encounter for general adult medical examination without abnormal findings Z00.00    2. History of cold sores Z86.19 famciclovir (FAMVIR) 500 MG tablet       COUNSELING:  Reviewed preventive health counseling, as reflected in patient instructions       Regular exercise       Healthy diet/nutrition       Vision screening       Osteoporosis Prevention/Bone Health    REFILLED YEARLY CONSISTENT PRESCRIPTIONS:      Current Outpatient Prescriptions:      famciclovir (FAMVIR) 500 MG tablet, Take 1 tablet (500 mg) by mouth 2 times daily, Disp: 180 tablet, Rfl: 3     ibuprofen (ADVIL/MOTRIN) 800 MG tablet, Take 1 tablet (800 mg) by mouth every 8 hours as needed for moderate pain (Patient not taking: Reported on 3/21/2018), Disp: 90 tablet, Rfl: 0     [DISCONTINUED] famciclovir (FAMVIR) 500 MG tablet, Take 1 tablet (500 mg) by mouth 2 times daily, Disp: 180 tablet, Rfl: 0    Patient will continue current OTC medications if listed above.          reports that she quit smoking about 5 years ago. Her smoking use included Cigarettes. She smoked 0.50 packs per day. She has never used smokeless tobacco.    Estimated body mass index is 23.41 kg/(m^2) as calculated from  "the following:    Height as of 11/5/15: 5' 3.2\" (1.605 m).    Weight as of 11/5/15: 133 lb (60.3 kg).       Counseling Resources:  ATP IV Guidelines  Pooled Cohorts Equation Calculator  Breast Cancer Risk Calculator  FRAX Risk Assessment  ICSI Preventive Guidelines  Dietary Guidelines for Americans, 2010  USDA's MyPlate  ASA Prophylaxis  Lung CA Screening    Sana Camara PA-C  [facility]    Orders Placed This Encounter     famciclovir (FAMVIR) 500 MG tablet       AVS given to patient upon discharge today.  Electronically signed by Sana Camara PA-C  March 21, 2018  5:13 PM      "

## 2018-04-10 DIAGNOSIS — Z86.19 HISTORY OF COLD SORES: Primary | ICD-10-CM

## 2018-04-10 RX ORDER — VALACYCLOVIR HYDROCHLORIDE 500 MG/1
500 TABLET, FILM COATED ORAL DAILY
Qty: 90 TABLET | Refills: 3 | Status: CANCELLED | OUTPATIENT
Start: 2018-04-10

## 2018-04-10 NOTE — TELEPHONE ENCOUNTER
Reason for Call:  Medication or medication refill:    Do you use a [facility]?  Name of the pharmacy and phone number for the current request:  [facility] - [phone]    Name of the medication requested: Patient is calling asking if she can change her prescription of famciclovir to   Valacyclovir. Reason for change is that she would like to be able to take as needed instead of daily due to cost. Please call her back when you are able    Other request:     Can we leave a detailed message on this number? YES    Phone number patient can be reached at: Home number on file [phone] (home)    Best Time: any    Call taken on 4/10/2018 at 12:31 PM by Sumaya Morris

## 2018-04-11 RX ORDER — VALACYCLOVIR HYDROCHLORIDE 1 G/1
2000 TABLET, FILM COATED ORAL 2 TIMES DAILY
Qty: 40 TABLET | Refills: 1 | Status: SHIPPED | OUTPATIENT
Start: 2018-04-11 | End: 2021-12-30

## 2018-04-11 NOTE — TELEPHONE ENCOUNTER
These let her know I sent this prescription.  She should take at the onset of a cold sore and follow the directions.  Will be additional pills in the bottle for any future episodes.  Sana Camara PA-C

## 2018-06-19 DIAGNOSIS — N94.6 DYSMENORRHEA: ICD-10-CM

## 2018-06-19 NOTE — TELEPHONE ENCOUNTER
"Requested Prescriptions   Pending Prescriptions Disp Refills     ibuprofen (ADVIL/MOTRIN) 800 MG tablet [Pharmacy Med Name: IBUPROFEN 800MG     TAB] 90 tablet 0     Sig: TAKE 1 TABLET BY MOUTH EVERY 8 HOURS AS NEEDED FOR MODERATE PAIN    NSAID Medications Failed    6/19/2018  7:53 AM       Failed - Normal ALT on file in past 12 months    No lab results found.         Failed - Normal AST on file in past 12 months    No lab results found.         Failed - Normal CBC on file in past 12 months    Recent Labs   Lab Test  06/18/12   1835   HGB  12.0       For [facility] ONLY: [id number] = WBC, EQKG032 = RBC         Failed - Normal serum creatinine on file in past 12 months    Recent Labs   Lab Test  06/08/16   1613   CR  0.68            Passed - Blood pressure under 140/90 in past 12 months    BP Readings from Last 3 Encounters:   03/21/18 118/78   02/16/17 106/64   01/25/17 104/60                Passed - Recent (12 mo) or future (30 days) visit within the authorizing provider's specialty    Patient had office visit in the last 12 months or has a visit in the next 30 days with authorizing provider or within the authorizing provider's specialty.  See \"Patient Info\" tab in inbasket, or \"Choose Columns\" in Meds & Orders section of the refill encounter.           Passed - Patient is age 6-64 years       Passed - No active pregnancy on record       Passed - No positive pregnancy test in past 12 months        Last Written Prescription Date:  2/14/18  Last Fill Quantity: 90,  # refills: 0   Last office visit: 3/21/2018 with prescribing provider:     Future Office Visit:      "

## 2018-06-21 RX ORDER — IBUPROFEN 800 MG/1
TABLET, FILM COATED ORAL
Qty: 90 TABLET | Refills: 0 | Status: SHIPPED | OUTPATIENT
Start: 2018-06-21 | End: 2018-09-14

## 2018-06-21 NOTE — TELEPHONE ENCOUNTER
Routing refill request to provider for review/approval because:  Labs not current:  Last Cr+ check was done on 6/8/16 =

## 2018-06-21 NOTE — TELEPHONE ENCOUNTER
Cr+ = WNL. Per refill guideline, pts who use Advil regularly should also check an annual AST/ ALT. This has NOT been done.  VikyRN

## 2018-09-14 ENCOUNTER — OFFICE VISIT (OUTPATIENT)
Dept: FAMILY MEDICINE | Facility: CLINIC | Age: 53
End: 2018-09-14
Payer: COMMERCIAL

## 2018-09-14 ENCOUNTER — TELEPHONE (OUTPATIENT)
Dept: FAMILY MEDICINE | Facility: CLINIC | Age: 53
End: 2018-09-14

## 2018-09-14 ENCOUNTER — RESULT FOLLOW UP (OUTPATIENT)
Dept: FAMILY MEDICINE | Facility: CLINIC | Age: 53
End: 2018-09-14

## 2018-09-14 VITALS
BODY MASS INDEX: 20.97 KG/M2 | SYSTOLIC BLOOD PRESSURE: 126 MMHG | HEART RATE: 90 BPM | DIASTOLIC BLOOD PRESSURE: 80 MMHG | OXYGEN SATURATION: 99 % | TEMPERATURE: 99 F | RESPIRATION RATE: 24 BRPM | WEIGHT: 119.3 LBS

## 2018-09-14 DIAGNOSIS — N94.10 DYSPAREUNIA IN FEMALE: ICD-10-CM

## 2018-09-14 DIAGNOSIS — Z12.4 SCREENING FOR MALIGNANT NEOPLASM OF CERVIX: ICD-10-CM

## 2018-09-14 DIAGNOSIS — R87.810 CERVICAL HIGH RISK HPV (HUMAN PAPILLOMAVIRUS) TEST POSITIVE: ICD-10-CM

## 2018-09-14 DIAGNOSIS — N89.8 VAGINAL DISCHARGE: Primary | ICD-10-CM

## 2018-09-14 LAB
ALBUMIN UR-MCNC: NEGATIVE MG/DL
APPEARANCE UR: CLEAR
BILIRUB UR QL STRIP: NEGATIVE
COLOR UR AUTO: YELLOW
GLUCOSE UR STRIP-MCNC: NEGATIVE MG/DL
HGB UR QL STRIP: ABNORMAL
KETONES UR STRIP-MCNC: NEGATIVE MG/DL
LEUKOCYTE ESTERASE UR QL STRIP: ABNORMAL
NITRATE UR QL: NEGATIVE
PH UR STRIP: 5 PH (ref 5–7)
RBC #/AREA URNS AUTO: 0 /HPF (ref 0–2)
SOURCE: ABNORMAL
SP GR UR STRIP: 1.02 (ref 1–1.03)
SPECIMEN SOURCE: NORMAL
UROBILINOGEN UR STRIP-MCNC: 0 MG/DL (ref 0–2)
WBC #/AREA URNS AUTO: 5 /HPF
WET PREP SPEC: NORMAL

## 2018-09-14 PROCEDURE — 87491 CHLMYD TRACH DNA AMP PROBE: CPT | Performed by: NURSE PRACTITIONER

## 2018-09-14 PROCEDURE — 87591 N.GONORRHOEAE DNA AMP PROB: CPT | Performed by: NURSE PRACTITIONER

## 2018-09-14 PROCEDURE — 87624 HPV HI-RISK TYP POOLED RSLT: CPT | Performed by: NURSE PRACTITIONER

## 2018-09-14 PROCEDURE — G0145 SCR C/V CYTO,THINLAYER,RESCR: HCPCS | Performed by: NURSE PRACTITIONER

## 2018-09-14 PROCEDURE — 81001 URINALYSIS AUTO W/SCOPE: CPT | Performed by: NURSE PRACTITIONER

## 2018-09-14 PROCEDURE — 99213 OFFICE O/P EST LOW 20 MIN: CPT | Performed by: NURSE PRACTITIONER

## 2018-09-14 PROCEDURE — 87210 SMEAR WET MOUNT SALINE/INK: CPT | Performed by: NURSE PRACTITIONER

## 2018-09-14 ASSESSMENT — PAIN SCALES - GENERAL: PAINLEVEL: NO PAIN (0)

## 2018-09-14 NOTE — LETTER
September 25, 2018      Lorraine Hunter  [address] [address]    Dear ,      This letter is in regards to the PAP smear and HPV (Human Papillomavirus) test you had done recently. Your PAP test result is normal, but your HPV (Human Papillomavirus) test was positive.     About 80 percent of women have been exposed to HPV virus throughout their lifetime. There is no medication for the treatment of HPV. Typically your own immune system gets rid of the virus before it does harm. HPV is spread by direct skin-to-skin contact, including sexual intercourse, oral sex, anal sex, or any other contact involving the genital area (example: hand to genital contact). It is not possible to become infected with HPV by touching an object, such as a toilet seat. Most people who are infected with HPV have no signs or symptoms.    Things that you can do to boost your immune system and help your body get rid of HPV: get plenty of rest, eat a well-balanced diet of healthy foods, stop smoking, exercise regularly and decrease stress.    Please return in 1 year to repeat your pap smear and HPV test.     If you have additional questions regarding this result, please call [phone].    Sincerely,      Karely Phoenix APRN DEEPTI/  Debbie Padilla, RN, BSN  Pap Tracking

## 2018-09-14 NOTE — PROGRESS NOTES
"  SUBJECTIVE:   Lorraine Hunter is a 52 year old female who presents to clinic today for the following health issues:      Vaginal Symptoms, pain with intercourse  Onset: couple months    Description:  Vaginal Discharge: \"icky yellow\"   Itching (Pruritis): no   Burning sensation:  no   Odor: no     Accompanying Signs & Symptoms:  Pain with Urination: no   Abdominal Pain: no   Fever: no low grade today 99    History:   Sexually active: YES  New Partner: YES  Possibility of Pregnancy:  No    Precipitating factors:   Recent Antibiotic Use: no     Alleviating factors:  none    Therapies Tried and outcome: none    The patient is seen in clinic today with 2 month history of thick yellow vaginal discharge.  She has noted no odor.  She denies vaginal bleeding.  The discharge developed shortly after she became sexually active with a new partner.  Prior to that she had no sexual activity for about 2 months.  She does experience dyspareunia with sex, otherwise has no pelvic pain.    She is due for a Pap smear, will collect that at this time since we will be completing a pelvic exam    Problem list and histories reviewed & adjusted, as indicated.  Additional history: as documented    BP Readings from Last 3 Encounters:   09/14/18 126/80   03/21/18 118/78   02/16/17 106/64    Wt Readings from Last 3 Encounters:   09/14/18 119 lb 4.8 oz (54.1 kg)   03/21/18 123 lb (55.8 kg)   11/05/15 133 lb (60.3 kg)                    Reviewed and updated as needed this visit by clinical staff       Reviewed and updated as needed this visit by Provider         ROS:  Constitutional, HEENT, cardiovascular, pulmonary, gi and gu systems are negative, except as otherwise noted.    OBJECTIVE:     /80  Pulse 90  Temp 99  F (37.2  C) (Temporal)  Resp 24  Wt 119 lb 4.8 oz (54.1 kg)  SpO2 99%  BMI 20.97 kg/m2  Body mass index is 20.97 kg/(m^2).   GENERAL: healthy, alert and no distress  ABDOMEN: soft, nontender, no hepatosplenomegaly, no " masses and bowel sounds normal   (female): normal female external genitalia, normal urethral meatus, vaginal mucosa, normal cervix/adnexa/uterus without masses or discharge    Diagnostic Test Results:  Results for orders placed or performed in visit on 09/14/18 (from the past 24 hour(s))   Wet prep   Result Value Ref [place]    Specimen Description Vagina     Wet Prep Many  PMNs seen       Wet Prep No Trichomonas seen     Wet Prep No clue cells seen     Wet Prep No yeast seen    *UA reflex to Microscopic and Culture ([place]; [facility]; [facility]; [facility]; [facility]; [facility]; [place]; [place])   Result Value Ref [place]    Color Urine Yellow     Appearance Urine Clear     Glucose Urine Negative NEG^Negative mg/dL    Bilirubin Urine Negative NEG^Negative    Ketones Urine Negative NEG^Negative mg/dL    Specific Gravity Urine 1.024 1.003 - 1.035    Blood Urine Small (A) NEG^Negative    pH Urine 5.0 5.0 - 7.0 pH    Protein Albumin Urine Negative NEG^Negative mg/dL    Urobilinogen mg/dL 0.0 0.0 - 2.0 mg/dL    Nitrite Urine Negative NEG^Negative    Leukocyte Esterase Urine Small (A) NEG^Negative    Source Unspecified Urine    Urine Microscopic   Result Value Ref [place]    WBC Urine 5 (A) OTO5^0 - 5 /HPF    RBC Urine 0 0 - 2 /HPF       ASSESSMENT/PLAN:     Problem List Items Addressed This Visit     None      Visit Diagnoses     Vaginal discharge    -  Primary    Relevant Orders    Wet prep (Completed)    NEISSERIA GONORRHOEA PCR (Completed)    CHLAMYDIA TRACHOMATIS PCR (Completed)    *UA reflex to Microscopic and Culture ([place]; [facility]; [facility]; [facility]; [facility]; [facility]; [place]; [place]) (Completed)    Dyspareunia in female        Screening for malignant neoplasm of cervix        Relevant Orders    Pap imaged thin layer screen with HPV - recommended age 30 - 65 years (select HPV order below) (Completed)    HPV High Risk Types DNA Cervical (Completed)            Urinalysis and wet prep are negative.  STD screen pending.  Pap smear result pending.  This may be normal physiologic discharge.  No significant discharge or abnormality noted on exam.    SONIA Rothman [facility]

## 2018-09-14 NOTE — LETTER
September 4, 2019      Lorraine Hunter  [address] [address]    Dear ,      At [facility], your health and wellness is our primary concern. That is why we are following up on a positive high risk HPV test from 9/14/18. Your provider had recommended that you have a Pap smear and HPV test completed by 9/14/19. Our records do not show that this has been scheduled.    It is important to complete the follow up that your provider has suggested for you to ensure that there are no worsening changes which may, over time, develop into cancer.      Please contact our office at  [phone] to schedule an appointment for a Pap smear and HPV test at your earliest convenience. If you have questions or concerns, please call the clinic and we will be happy to assist you.    If you have completed the tests outside of [facility], please have the results forwarded to our office. We will update the chart for your primary Physician to review before your next annual physical.     Thank you for choosing [facility]!    Sincerely,      Your [facility] Care Team/gabriel

## 2018-09-14 NOTE — LETTER
September 18, 2018      Lorraine SANTIAGO Dale  [address] [address]        Dear ,    We are writing to inform you of your test results.    STD screen is negative    Resulted Orders   Wet prep   Result Value Ref [place]    Specimen Description Vagina     Wet Prep Many  PMNs seen       Wet Prep No Trichomonas seen     Wet Prep No clue cells seen     Wet Prep No yeast seen    NEISSERIA GONORRHOEA PCR   Result Value Ref Range    Specimen Descrip Cervix     N Gonorrhea PCR Negative NEG^Negative      Comment:      Negative for N. gonorrhoeae rRNA by transcription mediated amplification.  A negative result by transcription mediated amplification does not preclude   the presence of N. gonorrhoeae infection because results are dependent on   proper and adequate collection, absence of inhibitors, and sufficient rRNA to   be detected.     CHLAMYDIA TRACHOMATIS PCR   Result Value Ref [place]    Specimen Description Cervix     Chlamydia Trachomatis PCR Negative NEG^Negative      Comment:      Negative for C. trachomatis rRNA by transcription mediated amplification.  A negative result by transcription mediated amplification does not preclude   the presence of C. trachomatis infection because results are dependent on   proper and adequate collection, absence of inhibitors, and sufficient rRNA to   be detected.     *UA reflex to Microscopic and Culture ([place]; [facility]; [facility]; [facility]; [facility]; [facility]; [place]; [place])   Result Value Ref Range    Color Urine Yellow     Appearance Urine Clear     Glucose Urine Negative NEG^Negative mg/dL    Bilirubin Urine Negative NEG^Negative    Ketones Urine Negative NEG^Negative mg/dL    Specific Gravity Urine 1.024 1.003 - 1.035    Blood Urine Small (A) NEG^Negative    pH Urine 5.0 5.0 - 7.0 pH    Protein Albumin Urine Negative NEG^Negative mg/dL    Urobilinogen mg/dL 0.0 0.0 - 2.0 mg/dL    Nitrite Urine Negative NEG^Negative     Leukocyte Esterase Urine Small (A) NEG^Negative    Source Unspecified Urine    Urine Microscopic   Result Value Ref [place]    WBC Urine 5 (A) OTO5^0 - 5 /HPF    RBC Urine 0 0 - 2 /HPF       If you have any questions or concerns, please call the clinic at the number listed above.       Sincerely,        SONIA Rothman CNP

## 2018-09-14 NOTE — TELEPHONE ENCOUNTER
Patient informed and understands that someone will be contacting her with the other results.   Ximena Salazar MA

## 2018-09-14 NOTE — TELEPHONE ENCOUNTER
----- Message from SONIA Rothman CNP sent at 9/14/2018  4:19 PM CDT -----  Urinalysis and wet prep are negative for infection.  She will be contacted with results of STD screen.

## 2018-09-14 NOTE — MR AVS SNAPSHOT
"              After Visit Summary   2018    Lorraine Hunter    MRN: [id number]           Patient Information     Date Of Birth          1965        Visit Information        Provider Department      2018 11:00 AM Karely Phoenix APRN CNP [facility]        Today's Diagnoses     Vaginal discharge    -  1    Dyspareunia in female        Screening for malignant neoplasm of cervix           Follow-ups after your visit        Who to contact     If you have questions or need follow up information about today's clinic visit or your schedule please contact [facility] directly at [phone].  Normal or non-critical lab and imaging results will be communicated to you by [employer], letter or phone within 4 business days after the clinic has received the results. If you do not hear from us within 7 days, please contact the clinic through [employer] or phone. If you have a critical or abnormal lab result, we will notify you by phone as soon as possible.  Submit refill requests through [employer] or call your pharmacy and they will forward the refill request to us. Please allow 3 business days for your refill to be completed.          Additional Information About Your Visit        MyChart Information     [employer] lets you send messages to your doctor, view your test results, renew your prescriptions, schedule appointments and more. To sign up, go to [email or web address] . Click on \"Log in\" on the left side of the screen, which will take you to the Welcome page. Then click on \"Sign up Now\" on the right side of the page.     You will be asked to enter the access code listed below, as well as some personal information. Please follow the directions to create your username and password.     Your access code is: [email or web address]  Expires: 2018  4:19 PM     Your access code will  in 90 days. If you need help or a new code, please call your [facility] or [phone].      "   Care EveryWhere ID     This is your Care EveryWhere ID. This could be used by other organizations to access your [facility] medical records  [id number]        Your Vitals Were     Pulse Temperature Respirations Pulse Oximetry BMI (Body Mass Index)       90 99  F (37.2  C) (Temporal) 24 99% 20.97 kg/m2        Blood Pressure from Last 3 Encounters:   09/14/18 126/80   03/21/18 118/78   02/16/17 106/64    Weight from Last 3 Encounters:   09/14/18 119 lb 4.8 oz (54.1 kg)   03/21/18 123 lb (55.8 kg)   11/05/15 133 lb (60.3 kg)              We Performed the Following     *UA reflex to Microscopic and Culture ([place]; [facility]; [facility]; [facility]; [facility]; [facility]; [place]; [place])     CHLAMYDIA TRACHOMATIS PCR     HPV High Risk Types DNA Cervical     NEISSERIA GONORRHOEA PCR     Pap imaged thin layer screen with HPV - recommended age 30 - 65 years (select HPV order below)     Urine Microscopic     Wet prep        Primary Care Provider Office Phone # Fax #    Sana Camara PA-C [phone] [address] [place] [address]        Equal Access to Services     WARREN HAINES AH: Hadii cee ku hadasho Soomaali, waaxda luqadaha, qaybta kaalmada adeegyada, mati ortiz. So [facility] [phone].    ATENCIÓN: Si habla español, tiene a al disposición servicios gratuitos de asistencia lingüística. Llame al [phone].    We comply with applicable federal civil rights laws and [place] laws. We do not discriminate on the basis of race, color, national origin, age, disability, sex, sexual orientation, or gender identity.            Thank you!     Thank you for choosing [facility]  for your care. Our goal is always to provide you with excellent care. Hearing back from our patients is one way we can continue to improve our services. Please take a few minutes to complete the written survey that you may receive in the mail after your  visit with us. Thank you!             Your Updated Medication List - Protect others around you: Learn how to safely use, store and throw away your medicines at www.disposemymeds.org.          This list is accurate as of 9/14/18  4:19 PM.  Always use your most recent med list.                   Brand Name Dispense Instructions for use Diagnosis    valACYclovir 1000 mg tablet    VALTREX    40 tablet    Take 2 tablets (2,000 mg) by mouth 2 times daily 4 tablets total per episode    History of cold sores

## 2018-09-17 LAB
C TRACH DNA SPEC QL NAA+PROBE: NEGATIVE
N GONORRHOEA DNA SPEC QL NAA+PROBE: NEGATIVE
SPECIMEN SOURCE: NORMAL
SPECIMEN SOURCE: NORMAL

## 2018-09-18 LAB
COPATH REPORT: NORMAL
PAP: NORMAL

## 2018-09-20 LAB
FINAL DIAGNOSIS: ABNORMAL
HPV HR 12 DNA CVX QL NAA+PROBE: POSITIVE
HPV16 DNA SPEC QL NAA+PROBE: NEGATIVE
HPV18 DNA SPEC QL NAA+PROBE: NEGATIVE
SPECIMEN DESCRIPTION: ABNORMAL
SPECIMEN SOURCE CVX/VAG CYTO: ABNORMAL

## 2018-09-23 NOTE — PROGRESS NOTES
8/22/07 ASCUS pap, + HR HPV 53.  9/28/07 [place]- Negative  2008, 2009, 2012 NIL paps, Neg HPV.  2015 NIL pap.  9/14/18 NIL pap, + HR HPV (not 16 or 18). Plan cotest in 1 year.  (MRA) (lks)  9/4/19 Cotest reminder letter sent (rlm)  10/14/19 Pap Follow up reminder call placed, voicemail left (rlm)  11/11/19 Patient is lost to follow-up. Routed to provider as NE. (rlm)

## 2019-02-15 DIAGNOSIS — N60.19 FIBROCYSTIC BREAST DISEASE (FCBD), UNSPECIFIED LATERALITY: Primary | ICD-10-CM

## 2019-02-15 NOTE — TELEPHONE ENCOUNTER
Fax from [facility] pharmacy received asking for refill on Ibuprofen 800mg. Last fill date 6/21/18 #90. Bonnie Rebollar, CMA

## 2019-02-18 NOTE — TELEPHONE ENCOUNTER
"Requested Prescriptions   Pending Prescriptions Disp Refills     ibuprofen (ADVIL/MOTRIN) 800 MG tablet      Last Written Prescription Date:  NA  Last Fill Quantity: NA,  # refills: NA   Last office visit: 9/14/2018 with prescribing provider:     Future Office Visit:     Sig: Take 1 tablet (800 mg) by mouth every 8 hours as needed for moderate pain    NSAID Medications Failed - 2/15/2019  2:06 PM       Failed - Normal ALT on file in past 12 months    No lab results found.         Failed - Normal AST on file in past 12 months    No lab results found.         Failed - Normal CBC on file in past 12 months    Recent Labs   Lab Test 06/18/12  1835   HGB 12.0          Failed - Medication is active on med list       Failed - Normal serum creatinine on file in past 12 months    Recent Labs   Lab Test 06/08/16  1613   CR 0.68          Passed - Blood pressure under 140/90 in past 12 months    BP Readings from Last 3 Encounters:   09/14/18 126/80   03/21/18 118/78   02/16/17 106/64            Passed - Recent (12 mo) or future (30 days) visit within the authorizing provider's specialty    Patient had office visit in the last 12 months or has a visit in the next 30 days with authorizing provider or within the authorizing provider's specialty.  See \"Patient Info\" tab in inbasket, or \"Choose Columns\" in Meds & Orders section of the refill encounter.           Passed - Patient is age 6-64 years       Passed - No active pregnancy on record       Passed - No positive pregnancy test in past 12 months      Routing refill request to provider for review/approval because:  Drug not active on patient's medication list  Labs not current:  ALT, AST, CBC, CR    Josi Johnson RN          "

## 2019-02-19 RX ORDER — IBUPROFEN 800 MG/1
800 TABLET, FILM COATED ORAL EVERY 8 HOURS PRN
Qty: 60 TABLET | Refills: 1 | Status: SHIPPED | OUTPATIENT
Start: 2019-02-19 | End: 2020-03-05

## 2019-05-09 ENCOUNTER — TELEPHONE (OUTPATIENT)
Dept: FAMILY MEDICINE | Facility: CLINIC | Age: 54
End: 2019-05-09

## 2019-05-09 DIAGNOSIS — B00.9 RECURRENT HERPES SIMPLEX: Primary | ICD-10-CM

## 2019-05-09 DIAGNOSIS — Z86.19 HISTORY OF COLD SORES: ICD-10-CM

## 2019-05-13 RX ORDER — VALACYCLOVIR HYDROCHLORIDE 1 G/1
2000 TABLET, FILM COATED ORAL 2 TIMES DAILY
Qty: 40 TABLET | Refills: 0 | Status: CANCELLED | OUTPATIENT
Start: 2019-05-13

## 2019-05-13 NOTE — TELEPHONE ENCOUNTER
"Requested Prescriptions   Pending Prescriptions Disp Refills     valACYclovir (VALTREX) 1000 mg tablet 40 tablet 1     Sig: Take 2 tablets (2,000 mg) by mouth 2 times daily 4 tablets total per episode   Last Written Prescription Date:  4/11/18  Last Fill Quantity: 40,  # refills: 1   Last office visit: 9/14/2018 with prescribing provider:     Future Office Visit:        Antivirals for Herpes Protocol Failed - 5/9/2019  4:28 PM        Failed - Normal serum creatinine on file in past 12 months     Recent Labs   Lab Test 06/08/16  1613   CR 0.68             Passed - Patient is age 12 or older        Passed - Recent (12 mo) or future (30 days) visit within the authorizing provider's specialty     Patient had office visit in the last 12 months or has a visit in the next 30 days with authorizing provider or within the authorizing provider's specialty.  See \"Patient Info\" tab in inbasket, or \"Choose Columns\" in Meds & Orders section of the refill encounter.              Passed - Medication is active on med list        Routing refill request to provider for review/approval because:  Labs not current:  CR    T'd up 10 day supply for provider review.  T'd up labs    Josi Johnson RN          "

## 2019-05-20 RX ORDER — VALACYCLOVIR HYDROCHLORIDE 500 MG/1
500 TABLET, FILM COATED ORAL DAILY
Qty: 90 TABLET | Refills: 3 | Status: SHIPPED | OUTPATIENT
Start: 2019-05-20 | End: 2020-03-05

## 2019-05-20 NOTE — TELEPHONE ENCOUNTER
According to the number of tablets prescribed, with 1 refill, she has had enough Valtrex to treat 20 outbreaks of cold sores, which is what this prescription is for.  If she is taking it on a regular basis for suppression, which she should be if this is happening that frequently, we need to prescribe a different dose.  I am prescribing her the suppressive therapy dose of Valtrex, which is a lower dose and is taken daily.  If she has concerns about this, she should make an appointment in clinic.

## 2019-05-20 NOTE — TELEPHONE ENCOUNTER
Called patient and she states she only take 4 tablets every 6 months. Pt does not need to take this daily. Please advise, patient states 10 tablets would be enough.

## 2019-07-03 ENCOUNTER — TELEPHONE (OUTPATIENT)
Dept: FAMILY MEDICINE | Facility: CLINIC | Age: 54
End: 2019-07-03

## 2019-07-03 NOTE — TELEPHONE ENCOUNTER
Attempted outreach to patient: Left message    Patient is due for:  Colonoscopy  Mammogram  physical    If patient had this completed elsewhere, please obtain approximate date, clinic/hospital where test was done and the result (abnormal/normal).    Please assist in scheduling if not completed.      Panel Management Review      Patient has the following on her problem list: None      Composite cancer screening  Chart review shows that this patient is due/due soon for the following Pap Smear, Mammogram and Colonoscopy  Summary:    Patient is due/failing the following:   COLONOSCOPY, MAMMOGRAM, PAP and PHYSICAL    Action needed:   Patient needs office visit for physical.    Type of outreach:    Phone, left message for patient to call back.     Questions for provider review:    None                                                                                                                                    AC[facility]/MA       Chart routed to  .

## 2019-07-03 NOTE — LETTER
[address] [id number] [phone]        Lorraine Hunter  [address] [place] [id number]      July 16, 2019      Dear Lorraine,    I care about your health and have reviewed your health plan, including your medical conditions, medication list, and lab results and am making recommendations based on this review, to better manage your health.    You are in particular need of attention regarding:  -Breast Cancer Screening  -Colon Cancer Screening  -Cervical Cancer Screening  -Wellness (Physical) Visit     I am recommending that you:  -schedule a WELLNESS (Physical) APPOINTMENT with me.   I will check fasting labs the same day - nothing to eat except water and meds for 8-10 hours prior.  -schedule a MAMMOGRAM which is due. You can call  [phone] to schedule your mammogram.    -schedule a PAP SMEAR EXAM. This is a screening test done during a pelvic exam to check for abnormal changes in the cells of the cervix.  Cervical cancer is preventable and curable if abnormal cells are detected and treated early. You can call your clinic at the number listed above to schedule your Pap Smear.    -schedule a COLONOSCOPY.  Colon cancer is now the second leading cause of cancer-related deaths in the [place] for both men and women.  There are over 130,000 new cases and 50,000 deaths per year from colon cancer.  A recent study, which included patients ages 55 to 79 found 50,400 [place] deaths from colorectal cancer could have been prevented if patients had undergone a colonoscopy in the previous 10 years.    If you have not had a colonoscopy, we encourage you to schedule by contacting us at [phone], Monday through Friday.  After hours, you may leave a message and we will return your call during normal business hours.      There is another option called a FIT test, if you don t wish to have a colonoscopy, which needs to be repeated every year.  It  does replace the colonoscopy for colorectal cancer screening and can detect hidden bleeding in the lower colon.  If a positive result is obtained, you would be referred for a colonoscopy. Please discuss this option with your provider.      For patients under/uninsured, we recommend you contact the [employer] program. [employer] is a free colorectal cancer screening program that provides colonoscopies for eligible under/uninsured [place] men and women. If you are interested in receiving a free colonoscopy, please call [employer] at [phone] (mention code [employer]) to see if you re eligible.     If you've had the preventative screening completed at another facility or feel you're not due for this screening, please call our clinic at the number listed above or send us a My Chart message so we can update our records. We would like to thank you in advance for taking the time to take care of your health.  If you have any questions, please don t hesitate to contact our clinic.    Sincerely,       Your [facility] Team

## 2019-10-14 ENCOUNTER — TELEPHONE (OUTPATIENT)
Dept: FAMILY MEDICINE | Facility: CLINIC | Age: 54
End: 2019-10-14

## 2019-10-14 NOTE — TELEPHONE ENCOUNTER
Pt is past due for Pap follow up  Reminder letter has been sent  LMTC her clinic with any questions or to schedule    Lucero Yeboah,   Pap Tracking

## 2020-02-19 ENCOUNTER — HOSPITAL ENCOUNTER (OUTPATIENT)
Dept: GENERAL RADIOLOGY | Facility: CLINIC | Age: 55
End: 2020-02-19
Attending: FAMILY MEDICINE
Payer: COMMERCIAL

## 2020-02-19 ENCOUNTER — OFFICE VISIT (OUTPATIENT)
Dept: FAMILY MEDICINE | Facility: CLINIC | Age: 55
End: 2020-02-19
Payer: COMMERCIAL

## 2020-02-19 VITALS
RESPIRATION RATE: 16 BRPM | DIASTOLIC BLOOD PRESSURE: 76 MMHG | SYSTOLIC BLOOD PRESSURE: 126 MMHG | WEIGHT: 124 LBS | HEART RATE: 86 BPM | TEMPERATURE: 97.8 F | HEIGHT: 63 IN | OXYGEN SATURATION: 100 % | BODY MASS INDEX: 21.97 KG/M2

## 2020-02-19 DIAGNOSIS — M25.532 LEFT WRIST PAIN: ICD-10-CM

## 2020-02-19 DIAGNOSIS — Z12.31 ENCOUNTER FOR SCREENING MAMMOGRAM FOR BREAST CANCER: Primary | ICD-10-CM

## 2020-02-19 PROCEDURE — 73110 X-RAY EXAM OF WRIST: CPT | Mod: TC

## 2020-02-19 PROCEDURE — 99213 OFFICE O/P EST LOW 20 MIN: CPT | Performed by: FAMILY MEDICINE

## 2020-02-19 SDOH — HEALTH STABILITY: MENTAL HEALTH: HOW OFTEN DO YOU HAVE A DRINK CONTAINING ALCOHOL?: 4 OR MORE TIMES A WEEK

## 2020-02-19 ASSESSMENT — PAIN SCALES - GENERAL: PAINLEVEL: NO PAIN (1)

## 2020-02-19 ASSESSMENT — MIFFLIN-ST. JEOR: SCORE: 1121.59

## 2020-02-19 NOTE — NURSING NOTE
Health Maintenance Due   Topic Date Due     HEPATITIS C SCREENING  1965     ADVANCE CARE PLANNING  1965     HIV SCREENING  12/27/1978     ZOSTER IMMUNIZATION (1 of 2) 12/27/2013     MAMMO SCREENING  01/25/2019     PREVENTIVE CARE VISIT  03/21/2019     COLONOSCOPY  07/11/2019     LIPID  07/31/2019     INFLUENZA VACCINE (1) 09/01/2019     HPV TEST  09/14/2019     PAP  09/14/2019     DTAP/TDAP/TD IMMUNIZATION (2 - Td) 12/17/2019     PHQ-2  01/01/2020     Health Maintenance reviewed at today's visit patient asked to schedule/complete:   Breast Cancer:  Patient agrees to schedule  Colon Cancer:  Patient declined   Luciana Jones, [facility]

## 2020-02-19 NOTE — PROGRESS NOTES
Subjective     Lorraine Hunter is a 54 year old female who presents to clinic today for the following health issues:    Slipped on ice a few days ago and caught herself with outstretched left wrist. 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 Full ROM. Has been using an ACE wrap to stabilize.      Chief Complaint   Patient presents with     Musculoskeletal Problem      she fell on ice and went to catch herself landing on her left wrist. Pain mostly with movement- dull 5/10. No previous injury       -------------------------------------    Patient Active Problem List   Diagnosis     Diffuse cystic mastopathy     CARDIOVASCULAR SCREENING; LDL GOAL LESS THAN 160     Adenomatous polyp of colon     History of cold sores     Cervical high risk HPV (human papillomavirus) test positive     Past Surgical History:   Procedure Laterality Date     COLONOSCOPY  2014    Procedure: COMBINED COLONOSCOPY, SINGLE BIOPSY/POLYPECTOMY BY BIOPSY;  Surgeon: Mendoza Clifford MD;  Location:  GI     DILATION AND CURETTAGE, HYSTEROSCOPY, ABLATE ENDOMETRIUM NOVASURE, COMBINED  2012    Procedure: COMBINED DILATION AND CURETTAGE, HYSTEROSCOPY, ABLATE ENDOMETRIUM NOVASURE;  Hysteroscopy Dilation and Curettage, Novasure;  Surgeon: Bola Calhoun MD;  Location:  OR     NO HISTORY OF SURGERY         Social History     Tobacco Use     Smoking status: Former Smoker     Packs/day: 0.50     Types: Cigarettes     Last attempt to quit: 2012     Years since quittin.1     Smokeless tobacco: Never Used   Substance Use Topics     Alcohol use: Yes     Alcohol/week: 10.0 standard drinks     Frequency: 4 or more times a week     Comment: glass of wine daily and few on weekends     Family History   Problem Relation Age of Onset     Family History Negative No family hx of          Current Outpatient Medications   Medication Sig Dispense Refill     valACYclovir (VALTREX) 1000 mg tablet  "Take 2 tablets (2,000 mg) by mouth 2 times daily 4 tablets total per episode 40 tablet 1     valACYclovir (VALTREX) 500 MG tablet Take 1 tablet (500 mg) by mouth daily Please note change in prescription, this is to be taken daily for suppression. 90 tablet 3     ibuprofen (ADVIL/MOTRIN) 800 MG tablet Take 1 tablet (800 mg) by mouth every 8 hours as needed for moderate pain (Patient not taking: Reported on 2/19/2020) 60 tablet 1       Reviewed and updated as needed this visit by Provider         Review of Systems   ROS COMP: Constitutional, HEENT, cardiovascular, pulmonary, GI, , musculoskeletal, neuro, skin, endocrine and psych systems are negative, except as otherwise noted.      Objective    /76   Pulse 86   Temp 97.8  F (36.6  C) (Tympanic)   Resp 16   Ht 1.6 m (5' 3\")   Wt 56.2 kg (124 lb)   SpO2 100%   BMI 21.97 kg/m    Body mass index is 21.97 kg/m .  Physical Exam   GENERAL: healthy, alert and no distress  EYES: Eyes grossly normal to inspection, PERRL and conjunctivae and sclerae normal  NECK: no asymmetry, masses, or scars  MS: no gross musculoskeletal defects noted, no edema  MS: normal muscle tone, normal range of motion and no edema. L wrist wrapped. No snuffbox tenderness  SKIN: no suspicious lesions or rashes  NEURO: Normal strength and tone, mentation intact and speech normal  PSYCH: mentation appears normal, affect normal/bright    Diagnostic Test Results:  Labs reviewed in Epic  No results found for this or any previous visit (from the past 24 hour(s)).        Assessment & Plan   Assessment  Healthy 53 y/o female presenting after a fall on her L wrist with concerns of a break    Plan  1. Encounter for screening mammogram for breast cancer  Scheduled  - *MA Screening Digital Bilateral; Future    2. Left wrist pain  XR shows no signs of break, will wait for radiology to over read and confirm. Advised to take ibuprofen 600 mg 3x daily for a few weeks for swelling and pain. Encouraged to " try out OTC splint for stabilization  - XR Wrist Left G/E 3 Views; Future      See Patient Instructions    No follow-ups on file.    This document serves as a record of the services and decisions personally performed and made by Artemio Davison MD.  It was created on his behalf by Sana Nolen, a trained medical student and scribe.  The creation of this record is based on the provider's personal observations and the statements of the patient.            I agree with the PFSH and ROS as completed by the MS, .  The remainder of the encounter was performed by me and scribed by the MS.  The scribed note accurately reflects my personal services and the decisions made by me.      Artemio Davison MD, MD  [facility]

## 2020-03-05 ENCOUNTER — OFFICE VISIT (OUTPATIENT)
Dept: FAMILY MEDICINE | Facility: CLINIC | Age: 55
End: 2020-03-05
Payer: COMMERCIAL

## 2020-03-05 ENCOUNTER — HOSPITAL ENCOUNTER (OUTPATIENT)
Dept: MAMMOGRAPHY | Facility: CLINIC | Age: 55
Discharge: HOME | End: 2020-03-05
Attending: FAMILY MEDICINE | Admitting: FAMILY MEDICINE
Payer: COMMERCIAL

## 2020-03-05 VITALS
RESPIRATION RATE: 18 BRPM | SYSTOLIC BLOOD PRESSURE: 112 MMHG | TEMPERATURE: 97.1 F | HEART RATE: 99 BPM | OXYGEN SATURATION: 99 % | WEIGHT: 124 LBS | DIASTOLIC BLOOD PRESSURE: 70 MMHG | BODY MASS INDEX: 21.97 KG/M2

## 2020-03-05 DIAGNOSIS — Z00.00 ROUTINE GENERAL MEDICAL EXAMINATION AT A HEALTH CARE FACILITY: ICD-10-CM

## 2020-03-05 DIAGNOSIS — N94.6 MENSTRUAL PAIN: Primary | ICD-10-CM

## 2020-03-05 DIAGNOSIS — Z12.4 SCREENING FOR CERVICAL CANCER: ICD-10-CM

## 2020-03-05 DIAGNOSIS — N60.19 FIBROCYSTIC BREAST DISEASE (FCBD), UNSPECIFIED LATERALITY: ICD-10-CM

## 2020-03-05 DIAGNOSIS — Z12.11 SPECIAL SCREENING FOR MALIGNANT NEOPLASMS, COLON: ICD-10-CM

## 2020-03-05 DIAGNOSIS — Z12.31 VISIT FOR SCREENING MAMMOGRAM: ICD-10-CM

## 2020-03-05 DIAGNOSIS — Z00.00 HEALTHCARE MAINTENANCE: ICD-10-CM

## 2020-03-05 DIAGNOSIS — B00.9 RECURRENT HERPES SIMPLEX: ICD-10-CM

## 2020-03-05 LAB
CHOLEST SERPL-MCNC: 216 MG/DL
HDLC SERPL-MCNC: 106 MG/DL
LDLC SERPL CALC-MCNC: 98 MG/DL
NONHDLC SERPL-MCNC: 110 MG/DL
TRIGL SERPL-MCNC: 58 MG/DL

## 2020-03-05 PROCEDURE — 87624 HPV HI-RISK TYP POOLED RSLT: CPT | Performed by: NURSE PRACTITIONER

## 2020-03-05 PROCEDURE — 80061 LIPID PANEL: CPT | Performed by: NURSE PRACTITIONER

## 2020-03-05 PROCEDURE — 77067 SCR MAMMO BI INCL CAD: CPT

## 2020-03-05 PROCEDURE — G0145 SCR C/V CYTO,THINLAYER,RESCR: HCPCS | Performed by: NURSE PRACTITIONER

## 2020-03-05 PROCEDURE — 36415 COLL VENOUS BLD VENIPUNCTURE: CPT | Performed by: NURSE PRACTITIONER

## 2020-03-05 PROCEDURE — 99396 PREV VISIT EST AGE 40-64: CPT | Performed by: NURSE PRACTITIONER

## 2020-03-05 RX ORDER — IBUPROFEN 800 MG/1
800 TABLET, FILM COATED ORAL EVERY 8 HOURS PRN
Qty: 60 TABLET | Refills: 1 | Status: SHIPPED | OUTPATIENT
Start: 2020-03-05 | End: 2021-01-22

## 2020-03-05 RX ORDER — VALACYCLOVIR HYDROCHLORIDE 500 MG/1
500 TABLET, FILM COATED ORAL DAILY
Qty: 90 TABLET | Refills: 3 | Status: SHIPPED | OUTPATIENT
Start: 2020-03-05 | End: 2021-03-31

## 2020-03-05 ASSESSMENT — ENCOUNTER SYMPTOMS
JOINT SWELLING: 0
SORE THROAT: 0
HEMATOCHEZIA: 0
CHILLS: 0
DYSURIA: 0
EYE PAIN: 0
HEMATURIA: 0
DIARRHEA: 0
HEADACHES: 0
NERVOUS/ANXIOUS: 0
HEARTBURN: 0
PALPITATIONS: 0
SHORTNESS OF BREATH: 0
ABDOMINAL PAIN: 0
COUGH: 0
CONSTIPATION: 0
FREQUENCY: 0
NAUSEA: 0
MYALGIAS: 0
DIZZINESS: 0
WEAKNESS: 0
FEVER: 0
ARTHRALGIAS: 0
BREAST MASS: 0
PARESTHESIAS: 0

## 2020-03-05 ASSESSMENT — PAIN SCALES - GENERAL: PAINLEVEL: NO PAIN (0)

## 2020-03-05 NOTE — LETTER
March 5, 2020      Lorraine Hunter  [address]   [facility] [address]        Dear ,    We are writing to inform you of your test results.    Please ensure that no concern with elevated cholesterol at this point. We will continue to monitor every couple years.       Mehdi Pineda NP on 3/5/2020 at 1:54 PM     Resulted Orders   Lipid Profile (Chol, Trig, HDL, LDL calc)   Result Value Ref Range    Cholesterol 216 (H) <200 mg/dL      Comment:      Desirable:       <200 mg/dl    Triglycerides 58 <150 mg/dL      Comment:      Fasting specimen    HDL Cholesterol 106 >49 mg/dL    LDL Cholesterol Calculated 98 <100 mg/dL      Comment:      Desirable:       <100 mg/dl    Non HDL Cholesterol 110 <130 mg/dL       If you have any questions or concerns, please call the clinic at the number listed above.       Sincerely,        Mehdi Pineda NP

## 2020-03-05 NOTE — PATIENT INSTRUCTIONS

## 2020-03-05 NOTE — PROGRESS NOTES
SUBJECTIVE:   CC: Lorraine Hunter is an 54 year old woman who presents for preventive health visit.         Healthy Habits:     Getting at least 3 servings of Calcium per day:  NO    Bi-annual eye exam:  NO    Dental care twice a year:  Yes    Sleep apnea or symptoms of sleep apnea:  None    Diet:  Regular (no restrictions)    Frequency of exercise:  None    Taking medications regularly:  Yes    Barriers to taking medications:  None    Medication side effects:  None    PHQ-2 Total Score: 0    Additional concerns today:  Yes      Today's PHQ-2 Score:   PHQ-2 (  [employer]) 3/5/2020   Q1: Little interest or pleasure in doing things 0   Q2: Feeling down, depressed or hopeless 0   PHQ-2 Score 0   Q1: Little interest or pleasure in doing things Not at all   Q2: Feeling down, depressed or hopeless Not at all   PHQ-2 Score 0       Abuse: Current or Past(Physical, Sexual or Emotional)- No  Do you feel safe in your environment? Yes    Have you ever done Advance Care Planning? (For example, a Health Directive, POLST, or a discussion with a medical provider or your loved ones about your wishes): No, advance care planning information given to patient to review.  Advanced care planning was discussed at today's visit.    Social History     Tobacco Use     Smoking status: Former Smoker     Packs/day: 0.50     Types: Cigarettes     Last attempt to quit: 2012     Years since quittin.1     Smokeless tobacco: Never Used   Substance Use Topics     Alcohol use: Yes     Alcohol/week: 10.0 standard drinks     Frequency: 4 or more times a week     Comment: glass of wine daily and few on weekends     If you drink alcohol do you typically have >3 drinks per day or >7 drinks per week? Yes      Alcohol Use 3/5/2020   Prescreen: >3 drinks/day or >7 drinks/week? Yes   Prescreen: >3 drinks/day or >7 drinks/week? -   AUDIT SCORE  5       Reviewed orders with patient.  Reviewed health maintenance and updated orders accordingly -  Yes  Lab work is in process  Labs reviewed in EPIC  Patient Active Problem List   Diagnosis     Diffuse cystic mastopathy     CARDIOVASCULAR SCREENING; LDL GOAL LESS THAN 160     Adenomatous polyp of colon     History of cold sores     Cervical high risk HPV (human papillomavirus) test positive     Past Surgical History:   Procedure Laterality Date     COLONOSCOPY  2014    Procedure: COMBINED COLONOSCOPY, SINGLE BIOPSY/POLYPECTOMY BY BIOPSY;  Surgeon: Mendoza Clifford MD;  Location:  GI     DILATION AND CURETTAGE, HYSTEROSCOPY, ABLATE ENDOMETRIUM NOVASURE, COMBINED  2012    Procedure: COMBINED DILATION AND CURETTAGE, HYSTEROSCOPY, ABLATE ENDOMETRIUM NOVASURE;  Hysteroscopy Dilation and Curettage, Novasure;  Surgeon: Bola Calhoun MD;  Location:  OR     NO HISTORY OF SURGERY         Social History     Tobacco Use     Smoking status: Former Smoker     Packs/day: 0.50     Types: Cigarettes     Last attempt to quit: 2012     Years since quittin.1     Smokeless tobacco: Never Used   Substance Use Topics     Alcohol use: Yes     Alcohol/week: 10.0 standard drinks     Frequency: 4 or more times a week     Comment: glass of wine daily and few on weekends     Family History   Problem Relation Age of Onset     Family History Negative No family hx of          Current Outpatient Medications   Medication Sig Dispense Refill     ibuprofen (ADVIL/MOTRIN) 800 MG tablet Take 1 tablet (800 mg) by mouth every 8 hours as needed for moderate pain 60 tablet 1     valACYclovir (VALTREX) 1000 mg tablet Take 2 tablets (2,000 mg) by mouth 2 times daily 4 tablets total per episode 40 tablet 1     valACYclovir (VALTREX) 500 MG tablet Take 1 tablet (500 mg) by mouth daily Please note change in prescription, this is to be taken daily for suppression. 90 tablet 3     No Known Allergies    Mammogram Screening: Patient over age 50, mutual decision to screen reflected in health maintenance.    Pertinent  mammograms are reviewed under the imaging tab.  History of abnormal Pap smear: YES - updated in Problem List and Health Maintenance accordingly  PAP / HPV Latest Ref Rng & Units 9/14/2018 11/5/2015 6/18/2012   PAP - NIL NIL NIL   HPV 16 DNA NEG:Negative Negative - -   HPV 18 DNA NEG:Negative Negative - -   OTHER HR HPV NEG:Negative Positive(A) - -     Reviewed and updated as needed this visit by clinical staff  Tobacco  Allergies  Meds  Problems  Med Hx  Surg Hx  Fam Hx         Reviewed and updated as needed this visit by Provider  Tobacco  Allergies  Meds  Problems  Med Hx  Surg Hx  Fam Hx            Review of Systems   Constitutional: Negative for chills and fever.   HENT: Negative for congestion, ear pain, hearing loss and sore throat.    Eyes: Negative for pain and visual disturbance.   Respiratory: Negative for cough and shortness of breath.    Cardiovascular: Negative for chest pain, palpitations and peripheral edema.   Gastrointestinal: Negative for abdominal pain, constipation, diarrhea, heartburn, hematochezia and nausea.   Breasts:  Negative for tenderness, breast mass and discharge.   Genitourinary: Negative for dysuria, frequency, genital sores, hematuria, pelvic pain, urgency, vaginal bleeding and vaginal discharge.   Musculoskeletal: Negative for arthralgias, joint swelling and myalgias.   Skin: Negative for rash.   Neurological: Negative for dizziness, weakness, headaches and paresthesias.   Psychiatric/Behavioral: Negative for mood changes. The patient is not nervous/anxious.      CONSTITUTIONAL: NEGATIVE for fever, chills, change in weight  INTEGUMENTARY/SKIN: NEGATIVE for worrisome rashes, moles or lesions  EYES: NEGATIVE for vision changes or irritation  ENT: NEGATIVE for ear, mouth and throat problems  RESP: NEGATIVE for significant cough or SOB  BREAST: NEGATIVE for masses, tenderness or discharge  CV: NEGATIVE for chest pain, palpitations or peripheral edema  GI: NEGATIVE for  nausea, abdominal pain, heartburn, or change in bowel habits  : NEGATIVE for unusual urinary or vaginal symptoms. No vaginal bleeding.  MUSCULOSKELETAL: NEGATIVE for significant arthralgias or myalgia  NEURO: NEGATIVE for weakness, dizziness or paresthesias  PSYCHIATRIC: NEGATIVE for changes in mood or affect     Patient presents today for her annual health physical.  She has a small area of swelling in her left groin,  No pain of any kind, noticed about 2 weeks ago, thinks just some form of fatty lump. She other wise is healthy.  She has has not had a period since her ablation in 2012 but she does have cramps monthly around the time of when her menses would be. Takes 800 mg of ibuprofen intermittently for these. No other new issues of concern. No fevers or chills. No headaches or visual changes. No drenching night sweats or bony pain. Mood is good, no new depression or anxiety.      OBJECTIVE:   /70   Pulse 99   Temp 97.1  F (36.2  C) (Temporal)   Resp 18   Wt 56.2 kg (124 lb)   SpO2 99%   BMI 21.97 kg/m    Physical Exam  GENERAL APPEARANCE: healthy, alert and no distress  EYES: Eyes grossly normal to inspection, PERRL and conjunctivae and sclerae normal  HENT: ear canals and TM's normal, nose and mouth without ulcers or lesions, oropharynx clear and oral mucous membranes moist  NECK: no adenopathy, no asymmetry, masses, or scars and thyroid normal to palpation  RESP: lungs clear to auscultation - no rales, rhonchi or wheezes  BREAST: normal without masses, tenderness or nipple discharge and no palpable axillary masses or adenopathy  CV: regular rate and rhythm, normal S1 S2, no S3 or S4, no murmur, click or rub, no peripheral edema and peripheral pulses strong  ABDOMEN: soft, nontender, no hepatosplenomegaly, no masses and bowel sounds normal   (female): normal female external genitalia, normal urethral meatus, vaginal mucosal atrophy noted, normal cervix, adnexae, and uterus without masses or  abnormal discharge She did have an enlarged lymph node, about 1.5 cm in the left groin, has been there a couple weeks. Just barely out of the normal range.   MS: no musculoskeletal defects are noted and gait is age appropriate without ataxia  SKIN: no suspicious lesions or rashes  NEURO: Normal strength and tone, sensory exam grossly normal, mentation intact and speech normal  PSYCH: mentation appears normal and affect normal/bright    Diagnostic Test Results:  Labs reviewed in Epic    ASSESSMENT/PLAN:   1. Recurrent herpes simplex  - Valtrex works for outbreaks, will continue the same.   - valACYclovir (VALTREX) 500 MG tablet; Take 1 tablet (500 mg) by mouth daily Please note change in prescription, this is to be taken daily for suppression.  Dispense: 90 tablet; Refill: 3    2. Fibrocystic breast disease (FCBD), unspecified laterality  Mammogram today.   - ibuprofen (ADVIL/MOTRIN) 800 MG tablet; Take 1 tablet (800 mg) by mouth every 8 hours as needed for moderate pain  Dispense: 60 tablet; Refill: 1    3. Menstrual pain  - Advil  For any cramping, uses very intermittently. No periods after  Ablations but still cramps monthly.   - Pap imaged thin layer screen with HPV - recommended age 30 - 65 years (select HPV order below)    4. Screening for cervical cancer  - Abnormal PAP in 2018, we will recheck the PAP today.   - HPV High Risk Types DNA Cervical    5. Healthcare maintenance  - Patient will be notified of any labs results requiring need for change in plan of care.   - Lipid Profile (Chol, Trig, HDL, LDL calc)  - Small soft 1.5cm enlarged lymph node in the left groin  6. Special screening for malignant neoplasms, colon  - Patient will be called to set up mammogram.    - GASTROENTEROLOGY ADULT REF PROCEDURE ONLY [facility] [phone]          COUNSELING:  Reviewed preventive health counseling, as reflected in patient instructions  Special attention given to:        Regular exercise       Healthy  "diet/nutrition       (Callie)menopause management    Estimated body mass index is 21.97 kg/m  as calculated from the following:    Height as of 2/19/20: 1.6 m (5' 3\").    Weight as of this encounter: 56.2 kg (124 lb).         reports that she quit smoking about 7 years ago. Her smoking use included cigarettes. She smoked 0.50 packs per day. She has never used smokeless tobacco.      Counseling Resources:  ATP IV Guidelines  Pooled Cohorts Equation Calculator  Breast Cancer Risk Calculator  FRAX Risk Assessment  ICSI Preventive Guidelines  Dietary Guidelines for Americans, 2010  USDA's MyPlate  ASA Prophylaxis  Lung CA Screening    Mehdi Pineda NP  [facility]  "

## 2020-03-06 ENCOUNTER — TELEPHONE (OUTPATIENT)
Dept: FAMILY MEDICINE | Facility: CLINIC | Age: 55
End: 2020-03-06

## 2020-03-06 NOTE — TELEPHONE ENCOUNTER
Called to schedule scope, patient states she is not ready at this time. She will call back when ready

## 2020-03-11 LAB
COPATH REPORT: NORMAL
PAP: NORMAL

## 2020-03-12 ENCOUNTER — RESULT FOLLOW UP (OUTPATIENT)
Dept: FAMILY MEDICINE | Facility: CLINIC | Age: 55
End: 2020-03-12

## 2020-03-12 DIAGNOSIS — R87.810 CERVICAL HIGH RISK HPV (HUMAN PAPILLOMAVIRUS) TEST POSITIVE: ICD-10-CM

## 2020-03-12 NOTE — PROGRESS NOTES
8/22/07 [facility] pap, + HR HPV 53.  9/28/07 [place]- Negative  2008, 2009, 2012 NIL paps, Neg HPV.  2015 NIL pap.  9/14/18 NIL pap, + HR HPV (not 16 or 18). Plan cotest in 1 year  11/11/19 Lost to follow-up for pap tracking  3/5/20 NIL pap, +HR HPV (not 16/18). Plan: colposcopy (lce)  3/17/20 left msg (lks)  3/20/20 Pt notified by phone.. COVID 19 colp due 3-6 mo.

## 2020-08-05 ENCOUNTER — PATIENT OUTREACH (OUTPATIENT)
Dept: FAMILY MEDICINE | Facility: CLINIC | Age: 55
End: 2020-08-05

## 2020-08-05 NOTE — LETTER
August 5, 2020      Lorraine Hunter  [address]   [place] [address]      Dear Ms. Hunter,    At [facility], your health and wellness is our primary concern. That is why we are following up on a positive high risk HPV test  from 03/05/20.  Your Provider had recommended that you have a Colposcopy  completed by 06/05/20.    COVID-19 scheduling restrictions have been revised and we are now able to make this appointment at limited clinic sites:    [place]  [phone]    [place] [phone]   [facility] ([place]) [phone]   [place] [phone]   [place] [phone] ([facility])   [place] [phone]   [place] [phone]   [facility] [phone]   [place] [phone]     It is important to complete the follow up that your provider has suggested for you to ensure that there are no worsening changes which may, over time, develop into cancer.      Please call your preferred clinic from the list above to schedule an appointment for a Colposcopy (this cannot be scheduled through [facility]) at your earliest convenience. If you have questions or concerns, please call the clinic and we will be happy to assist you.    If you have completed the tests outside of [facility], please have the results forwarded to our office. We will update the chart for your primary Physician to review before your next annual physical.     Thank you for choosing [facility]!    Sincerely,    Your [facility] Care Team//[facility]

## 2020-10-05 ENCOUNTER — PATIENT OUTREACH (OUTPATIENT)
Dept: FAMILY MEDICINE | Facility: CLINIC | Age: 55
End: 2020-10-05

## 2020-10-05 DIAGNOSIS — R87.810 CERVICAL HIGH RISK HPV (HUMAN PAPILLOMAVIRUS) TEST POSITIVE: ICD-10-CM

## 2020-10-05 NOTE — LETTER
October 5, 2020      Lorraine Hunter  [address]   [place] [address]        Dear ,    At [facility], your health and wellness are our primary concern. That is why we are following up on your most recent positive high-risk Human Papillomavirus (HPV) test.    Please call [phone] to schedule an appointment for your recommended follow-up Colposcopy (this cannot be scheduled through [facility]) at your earliest convenience. If you have chosen not to do the recommended colposcopy, please contact your clinic to schedule an appointment for a repeat Pap smear and Human Papillomavirus (HPV) test at this time.    If you have completed the appointment outside of the [facility] system, please have the records forwarded to our office. We will update your chart for your provider to review before your next annual wellness visit.     Thank you for choosing [facility]!      Sincerely,    Your [facility] Care Team

## 2020-11-02 ENCOUNTER — PATIENT OUTREACH (OUTPATIENT)
Dept: FAMILY MEDICINE | Facility: CLINIC | Age: 55
End: 2020-11-02

## 2020-11-02 DIAGNOSIS — R87.810 CERVICAL HIGH RISK HPV (HUMAN PAPILLOMAVIRUS) TEST POSITIVE: ICD-10-CM

## 2020-11-02 NOTE — TELEPHONE ENCOUNTER
Pt is past due for Pap follow up  Reminder letter has been sent  LMTC her clinic with any questions or to schedule    Lucero Yeboah -   [facility] Pap Tracking

## 2020-11-30 NOTE — TELEPHONE ENCOUNTER
FYI: Patient is lost to pap tracking follow-up. Attempts to contact pt have been made per reminder process and there has been no reply and/or no appt scheduled.     Thank you    Lucero Yeboah -    [facility] Pap Tracking

## 2021-01-22 DIAGNOSIS — N60.19 FIBROCYSTIC BREAST DISEASE (FCBD), UNSPECIFIED LATERALITY: ICD-10-CM

## 2021-01-22 RX ORDER — IBUPROFEN 800 MG/1
TABLET, FILM COATED ORAL
Qty: 60 TABLET | Refills: 0 | Status: SHIPPED | OUTPATIENT
Start: 2021-01-22 | End: 2021-07-07

## 2021-01-22 NOTE — TELEPHONE ENCOUNTER
"Routing refill request to provider for review/approval because:  Labs not current:  ALT, AST, CBC, CR  T'd up 1 month for provider review.      Requested Prescriptions   Pending Prescriptions Disp Refills     ibuprofen (ADVIL/MOTRIN) 800 MG tablet [Pharmacy Med Name: Ibuprofen 800 MG Oral Tablet] 60 tablet 0     Sig: TAKE 1 TABLET BY MOUTH EVERY 8 HOURS AS NEEDED FOR MODERATE PAIN   Last Written Prescription Date:  3/5/2020  Last Fill Quantity: 60,  # refills: 1   Last office visit: 3/5/2020 with prescribing provider:     Future Office Visit:    Fibrocystic breast disease (FCBD), unspecified laterality [N60.19]     NSAID Medications Failed - 1/22/2021  7:44 AM        Failed - Normal ALT on file in past 12 months     No lab results found.          Failed - Normal AST on file in past 12 months     No lab results found.          Failed - Normal CBC on file in past 12 months     No lab results found.        Failed - Normal serum creatinine on file in past 12 months     Recent Labs   Lab Test 06/08/16  1613   CR 0.68     Ok to refill medication if creatinine is low          Passed - Blood pressure under 140/90 in past 12 months     BP Readings from Last 3 Encounters:   03/05/20 112/70   02/19/20 126/76   09/14/18 126/80           Passed - Recent (12 mo) or future (30 days) visit within the authorizing provider's specialty     Patient has had an office visit with the authorizing provider or a provider within the authorizing providers department within the previous 12 mos or has a future within next 30 days. See \"Patient Info\" tab in inbasket, or \"Choose Columns\" in Meds & Orders section of the refill encounter.              Passed - Patient is age 6-64 years        Passed - Medication is active on med list        Passed - No active pregnancy on record        Passed - No positive pregnancy test in past 12 months         Josi Johnson RN      "

## 2021-02-11 ENCOUNTER — OFFICE VISIT (OUTPATIENT)
Dept: FAMILY MEDICINE | Facility: CLINIC | Age: 56
End: 2021-02-11
Payer: COMMERCIAL

## 2021-02-11 VITALS
HEART RATE: 84 BPM | BODY MASS INDEX: 22.14 KG/M2 | WEIGHT: 125 LBS | OXYGEN SATURATION: 98 % | SYSTOLIC BLOOD PRESSURE: 130 MMHG | RESPIRATION RATE: 14 BRPM | DIASTOLIC BLOOD PRESSURE: 82 MMHG | TEMPERATURE: 98 F

## 2021-02-11 DIAGNOSIS — I78.1 NEVUS, NON-NEOPLASTIC: ICD-10-CM

## 2021-02-11 DIAGNOSIS — F10.10 ALCOHOL ABUSE: ICD-10-CM

## 2021-02-11 DIAGNOSIS — L30.9 DERMATITIS: Primary | ICD-10-CM

## 2021-02-11 DIAGNOSIS — L98.8 AGE-RELATED FACIAL WRINKLES: ICD-10-CM

## 2021-02-11 LAB
ALBUMIN SERPL-MCNC: 4.3 G/DL (ref 3.4–5)
ALP SERPL-CCNC: 62 U/L (ref 40–150)
ALT SERPL W P-5'-P-CCNC: 17 U/L (ref 0–50)
ANION GAP SERPL CALCULATED.3IONS-SCNC: 5 MMOL/L (ref 3–14)
AST SERPL W P-5'-P-CCNC: 17 U/L (ref 0–45)
BILIRUB SERPL-MCNC: 0.6 MG/DL (ref 0.2–1.3)
BUN SERPL-MCNC: 12 MG/DL (ref 7–30)
CALCIUM SERPL-MCNC: 9.3 MG/DL (ref 8.5–10.1)
CHLORIDE SERPL-SCNC: 106 MMOL/L (ref 94–109)
CO2 SERPL-SCNC: 29 MMOL/L (ref 20–32)
CREAT SERPL-MCNC: 0.71 MG/DL (ref 0.52–1.04)
GFR SERPL CREATININE-BSD FRML MDRD: >90 ML/MIN/{1.73_M2}
GLUCOSE SERPL-MCNC: 103 MG/DL (ref 70–99)
POTASSIUM SERPL-SCNC: 4 MMOL/L (ref 3.4–5.3)
PROT SERPL-MCNC: 7.8 G/DL (ref 6.8–8.8)
SODIUM SERPL-SCNC: 140 MMOL/L (ref 133–144)

## 2021-02-11 PROCEDURE — 99213 OFFICE O/P EST LOW 20 MIN: CPT | Performed by: PHYSICIAN ASSISTANT

## 2021-02-11 PROCEDURE — 36415 COLL VENOUS BLD VENIPUNCTURE: CPT | Performed by: PHYSICIAN ASSISTANT

## 2021-02-11 PROCEDURE — 80053 COMPREHEN METABOLIC PANEL: CPT | Performed by: PHYSICIAN ASSISTANT

## 2021-02-11 RX ORDER — TRIAMCINOLONE ACETONIDE 1 MG/G
CREAM TOPICAL
Qty: 80 G | Refills: 0 | Status: SHIPPED | OUTPATIENT
Start: 2021-02-11 | End: 2021-02-25

## 2021-02-11 NOTE — PROGRESS NOTES
Assessment & Plan     Dermatitis  - triamcinolone (KENALOG) 0.1 % external cream; Apply to affected area twice daily for up to 14 days.    Alcohol abuse  - Comprehensive metabolic panel (BMP + Alb, Alk Phos, ALT, AST, Total. Bili, TP)  Lorraine states that she drinks 1 glass of wine most week needs and 3 to 4 glasses of wine on the weekend.  Advised that she reduce her alcohol consumption.  She is not interested in cutting back at this time.    Nevus, non-neoplastic  - DERMATOLOGY ADULT REFERRAL; Future  Keep a close eye on this lesion.  Due to increased sun exposure she is at increased risk for skin cancer.  I do recommend that you follow-up with dermatology to have a skin check.    Age-related facial wrinkles  - DERMATOLOGY ADULT REFERRAL; Future  Lorraine would like treatment for the crows feet around her eyes.  Recommended she see a dermatologist to discuss treatment options.      25 minutes spent on the date of the encounter doing chart review, patient visit and documentation     No follow-ups on file.    Deneen Cuellar PA-C  [facility]   Lorraine is a 55 year old who presents for the following health issues   Chief Complaint   Patient presents with     Derm Problem     rash on stomach since monday, starts out brown in the morning when wakes up but during the day it turns red, does not itch or painful all the time     Patient is also concerned about a spot on her back that she has had since this summer.   HPI       Rash  Onset/Duration: since sunday  Description  Location: stomach  Character: blotchy, red  Itching: mild  Intensity:  mild  Progression of Symptoms:  same  Accompanying signs and symptoms:   Fever: no  Body aches or joint pain: no  Sore throat symptoms: no  Recent cold symptoms: no  History:           Previous episodes of similar rash: None  New exposures:  None  Recent travel: no  Exposure to similar rash: no  Precipitating or alleviating factors: when her  clothes are rubbing her stomach then it may hurt and itch  Therapies tried and outcome: buffy Peters presents today for evaluation of a rash on her abdomen.  Been there for about a week and she notes it seems to be getting little bit worse.  It is not painful or itchy but she is concerned about it.  She notes that she drinks approximately 1 glass of wine on average a week night and 3 to 4 glasses of wine on average weekend night.  She is worried that this rash is a sign that she is drinking too much.  She would like to cut back on her alcohol consumption but she is not ready to make change yet.    She also notes that she has a spot on her back that she would like checked out and has crows feet that she would like treated.  She spends a lot of time in the sun.    Review of Systems   ROS negative except as stated above.        Objective    There were no vitals taken for this visit.  There is no height or weight on file to calculate BMI.  Physical Exam   GENERAL: healthy, alert and no distress  EYES: Eyes grossly normal to inspection, PERRL and conjunctivae noninjected and sclerae white, normal  MS: no gross musculoskeletal defects noted, no edema  SKIN: Scattered papular minimally erythematous rash across the mid abdomen from flank to flank.  Some secondary excoriations noted.  Right mid back with a dark nevus.  It is an irregular shape, dark-colored but consistent color throughout, flat and a little less than 1 cm in diameter.  She has not noted that it has changed over time.  NEURO: Normal strength and tone, mentation intact and speech normal    Results for orders placed or performed in visit on 02/11/21   Comprehensive metabolic panel (BMP + Alb, Alk Phos, ALT, AST, Total. Bili, TP)     Status: Abnormal   Result Value Ref Range    Sodium 140 133 - 144 mmol/L    Potassium 4.0 3.4 - 5.3 mmol/L    Chloride 106 94 - 109 mmol/L    Carbon Dioxide 29 20 - 32 mmol/L    Anion Gap 5 3 - 14 mmol/L    Glucose 103 (H) 70 - 99  mg/dL    Urea Nitrogen 12 7 - 30 mg/dL    Creatinine 0.71 0.52 - 1.04 mg/dL    GFR Estimate >90 >60 mL/min/[1.73_m2]    GFR Estimate If Black >90 >60 mL/min/[1.73_m2]    Calcium 9.3 8.5 - 10.1 mg/dL    Bilirubin Total 0.6 0.2 - 1.3 mg/dL    Albumin 4.3 3.4 - 5.0 g/dL    Protein Total 7.8 6.8 - 8.8 g/dL    Alkaline Phosphatase 62 40 - 150 U/L    ALT 17 0 - 50 U/L    AST 17 0 - 45 U/L       I spent a total of 10 minutes face-to-face with Lorraine Hunter during today's office visit.  Over 50% of this time was spent counseling the patient and/or coordinating care regarding rash, increased alcohol consumption.  See note for details.

## 2021-02-11 NOTE — PATIENT INSTRUCTIONS
Stop at lab on your way out today  Apply steroid cream only to affected areas twice daily. Avoid face and groin.  Eucerin cream to whole body daily, especially right after bathing.  Avoid lotions with a scent as these can be irritating.  Follow up with primary care provider for further evaluation

## 2021-02-11 NOTE — RESULT ENCOUNTER NOTE
Please call to notify Lorraine that her liver and kidney function test looks normal.  I still recommend cutting down on alcohol consumption.    KARRIE Holder [facility]

## 2021-03-29 DIAGNOSIS — B00.9 RECURRENT HERPES SIMPLEX: ICD-10-CM

## 2021-03-31 RX ORDER — VALACYCLOVIR HYDROCHLORIDE 500 MG/1
TABLET, FILM COATED ORAL
Qty: 90 TABLET | Refills: 0 | Status: SHIPPED | OUTPATIENT
Start: 2021-03-31 | End: 2021-06-24

## 2021-03-31 NOTE — TELEPHONE ENCOUNTER
Medication is being filled for 1 time refill only due to:  Patient needs to be seen because it has been more than one year since last visit.     Omaira Berger, MANASN, RN  [facility]

## 2021-06-24 DIAGNOSIS — B00.9 RECURRENT HERPES SIMPLEX: Primary | ICD-10-CM

## 2021-06-28 RX ORDER — VALACYCLOVIR HYDROCHLORIDE 500 MG/1
TABLET, FILM COATED ORAL
Qty: 90 TABLET | Refills: 0 | Status: SHIPPED | OUTPATIENT
Start: 2021-06-28 | End: 2021-10-07

## 2021-06-28 NOTE — TELEPHONE ENCOUNTER
Valtrex refilled X1. Will need appt for further fills to establish care with a new provider.   FIFI Salmon

## 2021-07-07 DIAGNOSIS — N60.19 FIBROCYSTIC BREAST DISEASE (FCBD), UNSPECIFIED LATERALITY: ICD-10-CM

## 2021-07-07 RX ORDER — IBUPROFEN 800 MG/1
TABLET, FILM COATED ORAL
Qty: 60 TABLET | Refills: 0 | Status: SHIPPED | OUTPATIENT
Start: 2021-07-07 | End: 2021-11-26

## 2021-07-07 NOTE — TELEPHONE ENCOUNTER
Pending Prescriptions:                       Disp   Refills    ibuprofen (ADVIL/MOTRIN) 800 MG tablet [Ph*60 tab*0        Sig: TAKE 1 TABLET BY MOUTH EVERY 8 HOURS AS NEEDED FOR           MODERATE PAIN    Routing refill request to provider for review/approval because:  Labs not current:  No results found for: WBC  No results found for: RBC  Lab Results   Component Value Date    HGB 12.0 06/18/2012     No results found for: HCT  No components found for: MCT  No results found for: MCV  No results found for: MCH  No results found for: MCHC  No results found for: RDW  No results found for: PLT

## 2021-10-04 DIAGNOSIS — B00.9 RECURRENT HERPES SIMPLEX: ICD-10-CM

## 2021-10-07 RX ORDER — VALACYCLOVIR HYDROCHLORIDE 500 MG/1
TABLET, FILM COATED ORAL
Qty: 90 TABLET | Refills: 0 | Status: SHIPPED | OUTPATIENT
Start: 2021-10-07 | End: 2021-12-13

## 2021-10-07 NOTE — TELEPHONE ENCOUNTER
Pending Prescriptions:                       Disp   Refills    valACYclovir (VALTREX) 500 MG tablet       90 tab*0        Sig: TAKE 1 TABLET BY MOUTH ONCE DAILY NOTE  CHANGE  IN            PRESCRIPTION,  THIS  IS  TO  BE  TAKEN  DAILY            FOR  SUPPRESSION    Routing refill request to provider for review/approval because:  Myriam given x1 and patient did not follow up, please advise

## 2021-11-26 DIAGNOSIS — N60.19 FIBROCYSTIC BREAST DISEASE (FCBD), UNSPECIFIED LATERALITY: ICD-10-CM

## 2021-11-29 RX ORDER — IBUPROFEN 800 MG/1
TABLET, FILM COATED ORAL
Qty: 60 TABLET | Refills: 0 | Status: SHIPPED | OUTPATIENT
Start: 2021-11-29 | End: 2021-12-30

## 2021-11-29 NOTE — TELEPHONE ENCOUNTER
Pending Prescriptions:                       Disp   Refills    ibuprofen (ADVIL/MOTRIN) 800 MG tablet     60 tab*0        Sig: TAKE 1 TABLET BY MOUTH EVERY 8 HOURS AS NEEDED FOR           MODERATE PAIN      Routing refill request to provider for review/approval because:  Myriam given x1 and patient did not follow up, please advise    Odalys Lynn RN on 11/29/2021 at 11:02 AM

## 2021-12-03 DIAGNOSIS — N60.19 FIBROCYSTIC BREAST DISEASE (FCBD), UNSPECIFIED LATERALITY: ICD-10-CM

## 2021-12-03 RX ORDER — IBUPROFEN 800 MG/1
TABLET, FILM COATED ORAL
Qty: 60 TABLET | Refills: 0 | OUTPATIENT
Start: 2021-12-03

## 2021-12-10 DIAGNOSIS — B00.9 RECURRENT HERPES SIMPLEX: ICD-10-CM

## 2021-12-13 RX ORDER — VALACYCLOVIR HYDROCHLORIDE 500 MG/1
TABLET, FILM COATED ORAL
Qty: 90 TABLET | Refills: 0 | Status: SHIPPED | OUTPATIENT
Start: 2021-12-13 | End: 2021-12-30

## 2021-12-30 ENCOUNTER — OFFICE VISIT (OUTPATIENT)
Dept: FAMILY MEDICINE | Facility: CLINIC | Age: 56
End: 2021-12-30
Payer: COMMERCIAL

## 2021-12-30 ENCOUNTER — TELEPHONE (OUTPATIENT)
Dept: FAMILY MEDICINE | Facility: CLINIC | Age: 56
End: 2021-12-30

## 2021-12-30 VITALS
OXYGEN SATURATION: 99 % | HEIGHT: 63 IN | TEMPERATURE: 98.3 F | RESPIRATION RATE: 18 BRPM | DIASTOLIC BLOOD PRESSURE: 72 MMHG | BODY MASS INDEX: 21.79 KG/M2 | WEIGHT: 123 LBS | SYSTOLIC BLOOD PRESSURE: 128 MMHG | HEART RATE: 74 BPM

## 2021-12-30 DIAGNOSIS — Z86.0100 HISTORY OF COLONIC POLYPS: ICD-10-CM

## 2021-12-30 DIAGNOSIS — Z13.220 LIPID SCREENING: ICD-10-CM

## 2021-12-30 DIAGNOSIS — N60.19 FIBROCYSTIC BREAST DISEASE (FCBD), UNSPECIFIED LATERALITY: ICD-10-CM

## 2021-12-30 DIAGNOSIS — R87.810 CERVICAL HIGH RISK HPV (HUMAN PAPILLOMAVIRUS) TEST POSITIVE: ICD-10-CM

## 2021-12-30 DIAGNOSIS — B00.9 RECURRENT HERPES SIMPLEX: ICD-10-CM

## 2021-12-30 DIAGNOSIS — Z00.00 ROUTINE GENERAL MEDICAL EXAMINATION AT A HEALTH CARE FACILITY: Primary | ICD-10-CM

## 2021-12-30 DIAGNOSIS — Z13.1 ENCOUNTER FOR SCREENING EXAMINATION FOR IMPAIRED GLUCOSE REGULATION AND DIABETES MELLITUS: ICD-10-CM

## 2021-12-30 LAB
CHOLEST SERPL-MCNC: 201 MG/DL
FASTING STATUS PATIENT QL REPORTED: YES
FASTING STATUS PATIENT QL REPORTED: YES
GLUCOSE BLD-MCNC: 106 MG/DL (ref 70–99)
HDLC SERPL-MCNC: 116 MG/DL
LDLC SERPL CALC-MCNC: 74 MG/DL
NONHDLC SERPL-MCNC: 85 MG/DL
TRIGL SERPL-MCNC: 54 MG/DL

## 2021-12-30 PROCEDURE — 82947 ASSAY GLUCOSE BLOOD QUANT: CPT | Performed by: NURSE PRACTITIONER

## 2021-12-30 PROCEDURE — 36415 COLL VENOUS BLD VENIPUNCTURE: CPT | Performed by: NURSE PRACTITIONER

## 2021-12-30 PROCEDURE — 99396 PREV VISIT EST AGE 40-64: CPT | Performed by: NURSE PRACTITIONER

## 2021-12-30 PROCEDURE — G0145 SCR C/V CYTO,THINLAYER,RESCR: HCPCS | Performed by: NURSE PRACTITIONER

## 2021-12-30 PROCEDURE — 87624 HPV HI-RISK TYP POOLED RSLT: CPT | Performed by: NURSE PRACTITIONER

## 2021-12-30 PROCEDURE — G0124 SCREEN C/V THIN LAYER BY MD: HCPCS | Performed by: PATHOLOGY

## 2021-12-30 PROCEDURE — 99214 OFFICE O/P EST MOD 30 MIN: CPT | Mod: 25 | Performed by: NURSE PRACTITIONER

## 2021-12-30 PROCEDURE — 80061 LIPID PANEL: CPT | Performed by: NURSE PRACTITIONER

## 2021-12-30 RX ORDER — IBUPROFEN 800 MG/1
TABLET, FILM COATED ORAL
Qty: 60 TABLET | Refills: 3 | Status: SHIPPED | OUTPATIENT
Start: 2021-12-30 | End: 2022-03-23

## 2021-12-30 RX ORDER — VALACYCLOVIR HYDROCHLORIDE 500 MG/1
TABLET, FILM COATED ORAL
Qty: 90 TABLET | Refills: 3 | Status: SHIPPED | OUTPATIENT
Start: 2021-12-30 | End: 2023-01-26

## 2021-12-30 ASSESSMENT — ENCOUNTER SYMPTOMS
JOINT SWELLING: 0
NERVOUS/ANXIOUS: 0
PALPITATIONS: 0
BREAST MASS: 0
FREQUENCY: 0
ABDOMINAL PAIN: 0
NAUSEA: 0
DYSURIA: 0
MYALGIAS: 0
DIARRHEA: 0
CONSTIPATION: 0
DIZZINESS: 0
HEMATOCHEZIA: 0
EYE PAIN: 0
HEMATURIA: 0
PARESTHESIAS: 0
WEAKNESS: 0
ARTHRALGIAS: 0
FEVER: 0
CHILLS: 0
SORE THROAT: 0
COUGH: 0
HEARTBURN: 0
SHORTNESS OF BREATH: 0
HEADACHES: 0

## 2021-12-30 ASSESSMENT — MIFFLIN-ST. JEOR: SCORE: 1107.05

## 2021-12-30 NOTE — TELEPHONE ENCOUNTER
----- Message from Megan Chua NP sent at 12/30/2021 12:38 PM CST -----  Please call patient. Her cholesterol looks good.  Her fasting blood sugar is just elevated at 106.  This should be less than 100.  Cutting back on sugars and carbohydrates and adding more exercise will help with this.  Megan Chua, CNP

## 2021-12-30 NOTE — PROGRESS NOTES
SUBJECTIVE:   CC: Lorraine Hunter is an 56 year old woman who presents for preventive health visit.       Patient has been advised of split billing requirements and indicates understanding: Yes  HPI          Today's PHQ-2 Score:   PHQ-2 (  [employer]) 2021   Q1: Little interest or pleasure in doing things 0   Q2: Feeling down, depressed or hopeless 0   PHQ-2 Score 0   PHQ-2 Total Score (12-17 Years)- Positive if 3 or more points; Administer PHQ-A if positive -   Q1: Little interest or pleasure in doing things Not at all   Q2: Feeling down, depressed or hopeless Not at all   PHQ-2 Score 0       Abuse: Current or Past (Physical, Sexual or Emotional) - No  Do you feel safe in your environment? 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        Social History     Tobacco Use     Smoking status: Former Smoker     Packs/day: 0.50     Years: 20.00     Pack years: 10.00     Types: Cigarettes     Quit date: 2012     Years since quittin.0     Smokeless tobacco: Never Used   Substance Use Topics     Alcohol use: Yes     Alcohol/week: 10.0 standard drinks     Comment: glass of wine daily and few on weekends     If you drink alcohol do you typically have >3 drinks per day or >7 drinks per week? No    Alcohol Use 2021   Prescreen: >3 drinks/day or >7 drinks/week? Yes   Prescreen: >3 drinks/day or >7 drinks/week? -   AUDIT SCORE  5       Reviewed orders with patient.  Reviewed health maintenance and updated orders accordingly - Yes  Lab work is in process  Labs reviewed in EPIC    Breast Cancer Screening:    Breast CA Risk Assessment (FHS-7) 2021   Do you have a family history of breast, colon, or ovarian cancer? No / Unknown         Mammogram Screening: Recommended mammography every 1-2 years with patient discussion and risk factor consideration  Pertinent mammograms are reviewed under the imaging tab.    History of abnormal Pap smear: YES - updated in Problem List and Health Maintenance  accordingly  PAP / HPV Latest Ref Rng & Units 3/5/2020 9/14/2018 11/5/2015   PAP (Historical) - NIL NIL NIL   HPV16 NEG:Negative Negative Negative -   HPV18 NEG:Negative Negative Negative -   HRHPV NEG:Negative Positive(A) Positive(A) -     Reviewed and updated as needed this visit by clinical staff  Tobacco  Allergies  Meds  Problems  Med Hx  Surg Hx  Fam Hx         Reviewed and updated as needed this visit by Provider  Tobacco  Allergies  Meds  Problems  Med Hx  Surg Hx  Fam Hx        Past Medical History:   Diagnosis Date     Abnormal Pap smear of cervix 08/22/2007    See problem list     Adenomatous polyp of colon 7/2014    q 5 yr colonoscopys     Cervical high risk HPV (human papillomavirus) test positive 8/22/07, 9/14/18    See problem list     HSV-1 infection     cold sores on lip      Past Surgical History:   Procedure Laterality Date     COLONOSCOPY  7/11/2014    Procedure: COMBINED COLONOSCOPY, SINGLE BIOPSY/POLYPECTOMY BY BIOPSY;  Surgeon: Mendoza Clifford MD;  Location:  GI     DILATION AND CURETTAGE, HYSTEROSCOPY, ABLATE ENDOMETRIUM NOVASURE, COMBINED  6/26/2012    Procedure: COMBINED DILATION AND CURETTAGE, HYSTEROSCOPY, ABLATE ENDOMETRIUM NOVASURE;  Hysteroscopy Dilation and Curettage, Novasure;  Surgeon: Bola Calhoun MD;  Location:  OR     NO HISTORY OF SURGERY         Review of Systems  CONSTITUTIONAL: NEGATIVE for fever, chills, change in weight  INTEGUMENTARY/SKIN: NEGATIVE for worrisome rashes, moles or lesions  EYES: NEGATIVE for vision changes or irritation  ENT: NEGATIVE for ear, mouth and throat problems  RESP: NEGATIVE for significant cough or SOB  BREAST: NEGATIVE for masses, tenderness or discharge  CV: NEGATIVE for chest pain, palpitations or peripheral edema  GI: NEGATIVE for nausea, abdominal pain, heartburn, or change in bowel habits  : NEGATIVE for unusual urinary or vaginal symptoms. No vaginal bleeding.  MUSCULOSKELETAL: NEGATIVE for significant  "arthralgias or myalgia  NEURO: NEGATIVE for weakness, dizziness or paresthesias  PSYCHIATRIC: NEGATIVE for changes in mood or affect      OBJECTIVE:   /72   Pulse 74   Temp 98.3  F (36.8  C) (Temporal)   Resp 18   Ht 1.6 m (5' 3\")   Wt 55.8 kg (123 lb)   SpO2 99%   BMI 21.79 kg/m    Physical Exam  GENERAL: healthy, alert and no distress  EYES: Eyes grossly normal to inspection, PERRL and conjunctivae and sclerae normal  HENT: ear canals and TM's normal, nose and mouth without ulcers or lesions  NECK: no adenopathy, no asymmetry, masses, or scars and thyroid normal to palpation  RESP: lungs clear to auscultation - no rales, rhonchi or wheezes  BREAST: normal without masses, tenderness or nipple discharge and no palpable axillary masses or adenopathy  CV: regular rate and rhythm, normal S1 S2, no S3 or S4, no murmur, click or rub, no peripheral edema and peripheral pulses strong  ABDOMEN: soft, nontender, no hepatosplenomegaly, no masses and bowel sounds normal   (female): normal female external genitalia, normal urethral meatus, vaginal mucosa pink, moist, well rugated, and normal cervix/adnexa/uterus without masses or discharge  MS: no gross musculoskeletal defects noted, no edema  SKIN: no suspicious lesions or rashes  NEURO: Normal strength and tone, mentation intact and speech normal  PSYCH: mentation appears normal, affect normal/bright    Diagnostic Test Results:  Labs reviewed in Epic  No results found for any visits on 12/30/21.    ASSESSMENT/PLAN:       ICD-10-CM    1. Routine general medical examination at a health care facility  Z00.00    2. Fibrocystic breast disease (FCBD), unspecified laterality  N60.19 ibuprofen (ADVIL/MOTRIN) 800 MG tablet   3. Recurrent herpes simplex  B00.9 valACYclovir (VALTREX) 500 MG tablet   4. Cervical high risk HPV (human papillomavirus) test positive  R87.810 Pap screen with HPV - recommended age 30 - 65 years   5. History of colonic polyps  Z86.010 Adult " "Gastro Ref - Procedure Only   6. Encounter for screening examination for impaired glucose regulation and diabetes mellitus  Z13.1 GLUCOSE   7. Lipid screening  Z13.220 Lipid panel reflex to direct LDL Fasting       Patient has been advised of split billing requirements and indicates understanding: Yes  COUNSELING:  Reviewed preventive health counseling, as reflected in patient instructions    Estimated body mass index is 21.79 kg/m  as calculated from the following:    Height as of this encounter: 1.6 m (5' 3\").    Weight as of this encounter: 55.8 kg (123 lb).        She reports that she quit smoking about 9 years ago. Her smoking use included cigarettes. She has a 10.00 pack-year smoking history. She has never used smokeless tobacco.      Counseling Resources:  ATP IV Guidelines  Pooled Cohorts Equation Calculator  Breast Cancer Risk Calculator  BRCA-Related Cancer Risk Assessment: FHS-7 Tool  FRAX Risk Assessment  ICSI Preventive Guidelines  Dietary Guidelines for Americans, 2010  USDA's MyPlate  ASA Prophylaxis  Lung CA Screening    Megan Chua NP  [facility]  "

## 2022-01-06 ENCOUNTER — PATIENT OUTREACH (OUTPATIENT)
Dept: FAMILY MEDICINE | Facility: CLINIC | Age: 57
End: 2022-01-06
Payer: COMMERCIAL

## 2022-01-06 DIAGNOSIS — R87.810 CERVICAL HIGH RISK HPV (HUMAN PAPILLOMAVIRUS) TEST POSITIVE: ICD-10-CM

## 2022-01-27 ENCOUNTER — OFFICE VISIT (OUTPATIENT)
Dept: DERMATOLOGY | Facility: CLINIC | Age: 57
End: 2022-01-27
Attending: PHYSICIAN ASSISTANT
Payer: COMMERCIAL

## 2022-01-27 DIAGNOSIS — D48.5 NEOPLASM OF UNCERTAIN BEHAVIOR OF SKIN: Primary | ICD-10-CM

## 2022-01-27 DIAGNOSIS — L57.8 SUN-DAMAGED SKIN: ICD-10-CM

## 2022-01-27 DIAGNOSIS — L82.1 SEBORRHEIC KERATOSIS: ICD-10-CM

## 2022-01-27 DIAGNOSIS — D18.01 CHERRY ANGIOMA: ICD-10-CM

## 2022-01-27 DIAGNOSIS — D22.9 MULTIPLE BENIGN NEVI: ICD-10-CM

## 2022-01-27 DIAGNOSIS — L81.4 SOLAR LENTIGO: ICD-10-CM

## 2022-01-27 PROCEDURE — 88305 TISSUE EXAM BY PATHOLOGIST: CPT | Performed by: DERMATOLOGY

## 2022-01-27 PROCEDURE — 11102 TANGNTL BX SKIN SINGLE LES: CPT | Performed by: DERMATOLOGY

## 2022-01-27 PROCEDURE — 99203 OFFICE O/P NEW LOW 30 MIN: CPT | Mod: 25 | Performed by: DERMATOLOGY

## 2022-01-27 PROCEDURE — 88341 IMHCHEM/IMCYTCHM EA ADD ANTB: CPT | Performed by: DERMATOLOGY

## 2022-01-27 PROCEDURE — 88342 IMHCHEM/IMCYTCHM 1ST ANTB: CPT | Performed by: DERMATOLOGY

## 2022-01-27 NOTE — PROGRESS NOTES
[facility] Dermatology Note  Encounter Date: Jan 27, 2022  Office Visit     Dermatology Problem List:  Last skin check 1/27/22, recommended yearly  0. NUB - right lower back, bx 1/27/22    Social history: Born and raised in [place]. History of indoor tanning in the past, no longer. Now uses sun-less amparo.  Family history: Negative for skin cancer.  ____________________________________________    Assessment & Plan:     # Sun damaged skin with solar lentigines, severe dermatoheliosis: Chronic, stable.  - Recommend sunscreens SPF #30 or greater, protective clothing and avoidance of tanning beds.    # Benign skin findings including: seborrheic keratoses, cherry angioma - minor, self limited problem  - No further intervention required. Patient to report changes.   - Patient reassured of the benign nature of these lesions.    # Multiple clinically benign nevi: Chronic, stable  - No further intervention required. Patient to report changes.   - Patient reassured of the benign nature of these lesions.    # Neoplasm of uncertain behavior on the right lower back. The differential diagnosis includes congenital nevus vs atypical nevus vs MM.   - See shave biopsy procedure note below.    Procedures Performed:   - Shave biopsy procedure note, location: right lower back. After discussion of benefits and risks including but not limited to bleeding, infection, scar, incomplete removal, recurrence, and non-diagnostic biopsy, written consent and photographs were obtained. The area was cleaned with isopropyl alcohol. 0.5mL of 1% lidocaine with epinephrine was injected to obtain adequate anesthesia of lesion. Shave biopsy at site performed. Hemostasis was achieved with aluminium chloride. Petrolatum ointment and a sterile dressing were applied. The patient tolerated the procedure and no complications were noted. The patient was provided with verbal and written post care instructions.     Follow-up: pending path results,  "1 year in-person for skin check, or earlier for new or changing lesions.    Staff and Scribe:     Scribe Disclosure:   I, Elisabethfrancesca Gates, am serving as a scribe to document services personally performed by this physician, Dr. Beatriz Portillo, based on data collection and the provider's statements to me.     Provider Disclosure:   The documentation recorded by the scribe accurately reflects the services I personally performed and the decisions made by me.    Beatriz Portillo MD    Department of Dermatology  [facility] [place]note 2]: Phone: [phone], Fax:[phone]  [facility]: Phone: [phone], Fax: [phone]    ____________________________________________    CC: Skin Check (FBSE. Area of concern-spot on back. No personal or family hx of SC)    HPI:  Ms. Lorraine Hunter is a(n) 56 year old female who presents today as a new patient for skin check.    She is new to our department. She has not seen a dermatologist prior. She has no history of skin cancer, atypical moles, or precancerous lesions to her knowledge.    Referred by PCP for \"nevus non-neoplastic\" and \"age-related facial wrinkles\" on 2/11/21. Note reviewed. Nevus of concern at the right mid back. Also mentions patient wants treatment for crows feet. Patient was prescribed triam 0.1% cream for dermatitis on the abdomen (BID x14 days per instructions).     Today, patient notes concerns about a spot on the back. Present for a while. Unsure if has changed. Has possibly grown.    Patient is otherwise feeling well, without additional skin concerns.     Labs Reviewed:  N/A    Physical Exam:  Vitals: There were no vitals taken for this visit.  SKIN: Total skin excluding the undergarment areas was performed. The exam included the head/face, neck, both arms, chest, back, abdomen, buttocks, both legs, digits and/or nails. Scalp was not " examined.  - Duarte Type II  - Scattered brown macules on sun exposed areas. Numerous.  - There are dome shaped bright red papules on the trunk.   - Multiple regular brown pigmented macules and papules are identified on the trunk and extremities. About 60 nevi in all. All uniform except on right lower back below.  - There are waxy stuck on tan to brown papules on the trunk.  - Right lower back: 1.2cm patchy brown patch  - No other lesions of concern on areas examined.     Medications:  Current Outpatient Medications   Medication     calcium carbonate 600 mg-vitamin D 400 units (CALTRATE) 600-400 MG-UNIT per tablet     ibuprofen (ADVIL/MOTRIN) 800 MG tablet     NONFORMULARY     valACYclovir (VALTREX) 500 MG tablet     No current facility-administered medications for this visit.      Past Medical History:   Patient Active Problem List   Diagnosis     Diffuse cystic mastopathy     CARDIOVASCULAR SCREENING; LDL GOAL LESS THAN 160     Adenomatous polyp of colon     History of cold sores     Cervical high risk HPV (human papillomavirus) test positive     Past Medical History:   Diagnosis Date     Abnormal Pap smear of cervix 08/22/2007    See problem list     Adenomatous polyp of colon 7/2014    q 5 yr colonoscopys     Cervical high risk HPV (human papillomavirus) test positive 8/22/07, 9/14/18    See problem list     HSV-1 infection     cold sores on lip       CC Deneen Cuellar PA-C  [address] [place] [address] on close of this encounter.

## 2022-01-27 NOTE — NURSING NOTE
Lorraine Hunter's goals for this visit include:   Chief Complaint   Patient presents with     Skin Check     FBSE. Area of concern-spot on back. No personal or family hx of SC       She requests these members of her care team be copied on today's visit information:     PCP: No Ref-Primary, Physician    Referring Provider:  Deneen Cuellar PA-C  [address] [place] [address]    There were no vitals taken for this visit.    Do you need any medication refills at today's visit? Ada Lucas on 1/27/2022 at 3:48 PM

## 2022-01-27 NOTE — PATIENT INSTRUCTIONS

## 2022-01-27 NOTE — LETTER
1/27/2022         RE: Lorraine Hunter  [address]  [place] [address]        Dear Colleague,    Thank you for referring your patient, Lorraine Hunter, to the [facility]. Please see a copy of my visit note below.    [facility] Dermatology Note  Encounter Date: Jan 27, 2022  Office Visit     Dermatology Problem List:  Last skin check 1/27/22, recommended yearly  0. NUB - right lower back, bx 1/27/22    Social history: Born and raised in [place]. History of indoor tanning in the past, no longer. Now uses sun-less amparo.  Family history: Negative for skin cancer.  ____________________________________________    Assessment & Plan:     # Sun damaged skin with solar lentigines, severe dermatoheliosis: Chronic, stable.  - Recommend sunscreens SPF #30 or greater, protective clothing and avoidance of tanning beds.    # Benign skin findings including: seborrheic keratoses, cherry angioma - minor, self limited problem  - No further intervention required. Patient to report changes.   - Patient reassured of the benign nature of these lesions.    # Multiple clinically benign nevi: Chronic, stable  - No further intervention required. Patient to report changes.   - Patient reassured of the benign nature of these lesions.    # Neoplasm of uncertain behavior on the right lower back. The differential diagnosis includes congenital nevus vs atypical nevus vs MM.   - See shave biopsy procedure note below.    Procedures Performed:   - Shave biopsy procedure note, location: right lower back. After discussion of benefits and risks including but not limited to bleeding, infection, scar, incomplete removal, recurrence, and non-diagnostic biopsy, written consent and photographs were obtained. The area was cleaned with isopropyl alcohol. 0.5mL of 1% lidocaine with epinephrine was injected to obtain adequate anesthesia of lesion. Shave biopsy at site performed. Hemostasis was achieved  "with aluminium chloride. Petrolatum ointment and a sterile dressing were applied. The patient tolerated the procedure and no complications were noted. The patient was provided with verbal and written post care instructions.     Follow-up: pending path results, 1 year in-person for skin check, or earlier for new or changing lesions.    Staff and Scribe:     Scribe Disclosure:   I, Elisabethfrancesca Gates, am serving as a scribe to document services personally performed by this physician, Dr. Beatriz Portillo, based on data collection and the provider's statements to me.     Provider Disclosure:   The documentation recorded by the scribe accurately reflects the services I personally performed and the decisions made by me.    Beatriz Portillo MD    Department of Dermatology  [facility]: Phone: [phone], Fax:[phone]  [facility]: Phone: [phone], Fax: [phone]    ____________________________________________    CC: Skin Check (FBSE. Area of concern-spot on back. No personal or family hx of SC)    HPI:  Ms. Lorraine Hunter is a(n) 56 year old female who presents today as a new patient for skin check.    She is new to our department. She has not seen a dermatologist prior. She has no history of skin cancer, atypical moles, or precancerous lesions to her knowledge.    Referred by PCP for \"nevus non-neoplastic\" and \"age-related facial wrinkles\" on 2/11/21. Note reviewed. Nevus of concern at the right mid back. Also mentions patient wants treatment for crows feet. Patient was prescribed triam 0.1% cream for dermatitis on the abdomen (BID x14 days per instructions).     Today, patient notes concerns about a spot on the back. Present for a while. Unsure if has changed. Has possibly grown.    Patient is otherwise feeling well, without additional skin concerns.     Labs Reviewed:  N/A    Physical " Exam:  Vitals: There were no vitals taken for this visit.  SKIN: Total skin excluding the undergarment areas was performed. The exam included the head/face, neck, both arms, chest, back, abdomen, buttocks, both legs, digits and/or nails. Scalp was not examined.  - Duarte Type II  - Scattered brown macules on sun exposed areas. Numerous.  - There are dome shaped bright red papules on the trunk.   - Multiple regular brown pigmented macules and papules are identified on the trunk and extremities. About 60 nevi in all. All uniform except on right lower back below.  - There are waxy stuck on tan to brown papules on the trunk.  - Right lower back: 1.2cm patchy brown patch  - No other lesions of concern on areas examined.     Medications:  Current Outpatient Medications   Medication     calcium carbonate 600 mg-vitamin D 400 units (CALTRATE) 600-400 MG-UNIT per tablet     ibuprofen (ADVIL/MOTRIN) 800 MG tablet     NONFORMULARY     valACYclovir (VALTREX) 500 MG tablet     No current facility-administered medications for this visit.      Past Medical History:   Patient Active Problem List   Diagnosis     Diffuse cystic mastopathy     CARDIOVASCULAR SCREENING; LDL GOAL LESS THAN 160     Adenomatous polyp of colon     History of cold sores     Cervical high risk HPV (human papillomavirus) test positive     Past Medical History:   Diagnosis Date     Abnormal Pap smear of cervix 08/22/2007    See problem list     Adenomatous polyp of colon 7/2014    q 5 yr colonoscopys     Cervical high risk HPV (human papillomavirus) test positive 8/22/07, 9/14/18    See problem list     HSV-1 infection     cold sores on lip       CC SANJUANA Lieberman-C  [address] [place] [address] on close of this encounter.        Again, thank you for allowing me to participate in the care of your patient.        Sincerely,        Beatriz Portillo MD

## 2022-01-27 NOTE — NURSING NOTE
,The following medication was given:     MEDICATION:  Lidocaine with epinephrine 1% 1:[id number]  ROUTE: SQ  SITE: see procedure note  DOSE: see procedure note  LOT #: -[id number]  : Hospira  EXPIRATION DATE: 6/1/22  NDC#: 9844-2089-10  Was there drug waste? No    Multi-dose vial: Yes          Bonita Lucas on 1/27/2022 at 4:18 PM

## 2022-01-27 NOTE — LETTER
February 17, 2022      Lorraine Hunter  [address]  [place] [address]        Dear Lorraine ,     We have not been able to reach you by phone to go over your biopsy results.      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 The lesion extends to the lateral margin.  While definite features of melanoma in situ/lentigo maligna are not identified, given its broad lateral diameter and location on sun-damaged skin, re-excision is recommended to ensure complete removal.     Our recommendations are for re-excision to ensure this atypical mole is removed completely.      We have recommended that you receive treatment for this area and want you to understand the risks of not receiving treatment.  The longer these areas go untreated, there is a higher risk for them to grow, change, and potentially to spread to other areas of your body (metastases). If the lesion remains untreated, it could very rarely lead to death.   If you have questions regarding your condition and our treatment recommendations, we are available to answer your questions.     To reschedule your appointment or have questions answered please call us at [phone].     If you have already chosen to receive care elsewhere or would like to have treatment at another facility, please call us at [phone] and we will transfer the results to another clinic.      If financial concerns have kept you from your appointment, please call the financial counselor at [phone] for assistance.     Sincerely,     Beatriz Portillo MD   Department of Dermatology  [facility]: Phone: [phone], Fax:[phone]

## 2022-02-04 LAB
PATH REPORT.COMMENTS IMP SPEC: NORMAL
PATH REPORT.FINAL DX SPEC: NORMAL
PATH REPORT.GROSS SPEC: NORMAL
PATH REPORT.MICROSCOPIC SPEC OTHER STN: NORMAL
PATH REPORT.RELEVANT HX SPEC: NORMAL

## 2022-02-14 ENCOUNTER — TELEPHONE (OUTPATIENT)
Dept: DERMATOLOGY | Facility: CLINIC | Age: 57
End: 2022-02-14
Payer: COMMERCIAL

## 2022-02-14 NOTE — TELEPHONE ENCOUNTER
[facility] Call Center    Phone Message    May a detailed message be left on voicemail: yes     Reason for Call: patient said she missed a call from clinic with results from her biopsy, she said call didn't leave a name. Please give patient a call back. Thank you     Action Taken: Message routed to:  Adult Clinics: Dermatology p [id number]    Travel Screening: Not Applicable                                                                       medications

## 2022-02-14 NOTE — TELEPHONE ENCOUNTER
The patient called back about results.  Advised what was in the notes and next steps for excision.  Patient said she will keep this in mind and see when she can get back in again.  Patient did not have further questions and did not want to schedule at this time.  Kaley Garcia RN

## 2022-02-14 NOTE — TELEPHONE ENCOUNTER
Attempted to reach patient. No answer. Left message for patient to return a call for result notes.     Will close this encounter.  See result note.

## 2022-02-17 ENCOUNTER — TELEPHONE (OUTPATIENT)
Dept: DERMATOLOGY | Facility: CLINIC | Age: 57
End: 2022-02-17
Payer: COMMERCIAL

## 2022-02-17 NOTE — TELEPHONE ENCOUNTER
Beatriz Looney CMA   2/17/2022  2:38 PM CST Back to Top        Letters drafted & printed.  Sent to patient certified and cc'd patient's referring provider.     Beatriz Portillo MD   2/17/2022  2:18 PM CST         Sent certified and regular mail.     Dear Lorraine ,     We have not been able to reach you by phone to go over your biopsy results.      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 The lesion extends to the lateral margin.  While definite features of melanoma in situ/lentigo maligna are not identified, given its broad lateral diameter and location on sun-damaged skin, re-excision is recommended to ensure complete removal.     Our recommendations are for re-excision to ensure this atypical mole is removed completely.      We have recommended that you receive treatment for this area and want you to understand the risks of not receiving treatment.  The longer these areas go untreated, there is a higher risk for them to grow, change, and potentially to spread to other areas of your body (metastases). If the lesion remains untreated, it could very rarely lead to death.   If you have questions regarding your condition and our treatment recommendations, we are available to answer your questions.     To reschedule your appointment or have questions answered please call us at [phone].     If you have already chosen to receive care elsewhere or would like to have treatment at another facility, please call us at [phone] and we will transfer the results to another clinic.      If financial concerns have kept you from your appointment, please call the financial counselor at [phone] for assistance.     Sincerely,     Beatriz Portillo MD    Department of Dermatology  [facility]: Phone: [phone], Fax:[phone]  [facility]: Phone: [phone], Fax: [phone]                    CC:  Deneen Cuellar PA-C  [address] [place] [address]    Natalia Mansfield RN   2/17/2022  1:12 PM CST         Three attempts to reach patient.  Please write letter.  FIFI Morlaes CMA   2/14/2022 10:02 AM CST         Attempted to reach patient. Left message for her to return call.     Bonita Shayy   2/9/2022 11:05 AM CST         LM for patient to return call.            Bonita Lucas on 2/9/2022 at 11:05 AM    Natalia Mansfield RN   2/7/2022  2:06 PM CST         [facility] a [facility]  FIFI Morales MD   2/4/2022  2:52 PM CST         Please let Lorraine know that biopsy showed a mole with some atypical cells. Our pathology recommended excision of the area to ensure all the atypical cells are removed completely. Please schedule with Dr. Atwood.     Next skin check in 1 year.     Beatriz Portillo MD    Department of Dermatology  [facility]: Phone: [phone], Fax:[phone]  [facility]: Phone: [phone], Fax: [phone]

## 2022-03-14 DIAGNOSIS — B00.9 RECURRENT HERPES SIMPLEX: ICD-10-CM

## 2022-03-16 RX ORDER — VALACYCLOVIR HYDROCHLORIDE 500 MG/1
TABLET, FILM COATED ORAL
Qty: 90 TABLET | Refills: 0 | OUTPATIENT
Start: 2022-03-16

## 2022-03-22 ENCOUNTER — NURSE TRIAGE (OUTPATIENT)
Dept: FAMILY MEDICINE | Facility: CLINIC | Age: 57
End: 2022-03-22
Payer: COMMERCIAL

## 2022-03-22 ENCOUNTER — TELEPHONE (OUTPATIENT)
Dept: INTERNAL MEDICINE | Facility: CLINIC | Age: 57
End: 2022-03-22
Payer: COMMERCIAL

## 2022-03-22 NOTE — TELEPHONE ENCOUNTER
"Pt transferred to  for SEVERE abd, back, side pain. Pt reports abd pain is lower right side around flank to back. She thinks it is musculoskeletal from painting, extra activities. She reports the pain is 9/10 since Sunday. Takes ibuprofen, \"she doesn't want to live on that'  No fevers/chills  No vomiting  No gross hematuria  No diarrhea  Pt able to do ADL's since going on since SUN.     Huddled w/ DM ok for same day appt tomorrow. If symptoms worsen should be seen in UC/ED. Pt verbalized understanding.   Next 5 appointments (look out 90 days)    Mar 23, 2022 10:20 AM  (Arrive by 10:00 AM)  Provider Visit with Jazlyn Granados Mai, MD  [facility] ([facility] ) [address] [place] [id number] [phone]          Reason for Disposition    Constant abdominal pain lasting > 2 hours    Additional Information    Negative: Passed out (i.e., fainted, collapsed and was not responding)    Negative: Shock suspected (e.g., cold/pale/clammy skin, too weak to stand, low BP, rapid pulse)    Negative: Sounds like a life-threatening emergency to the triager    Negative: Chest pain    Negative: Pain is mainly in upper abdomen (if needed ask: 'is it mainly above the belly button?')    Negative: Abdominal pain and pregnant > 20 weeks    Negative: Abdominal pain and pregnant < 20 weeks    Answer Assessment - Initial Assessment Questions  1. LOCATION: \"Where does it hurt?\"       Right lower quadrant   2. RADIATION: \"Does the pain shoot anywhere else?\" (e.g., chest, back)      Right flank and back   3. ONSET: \"When did the pain begin?\" (e.g., minutes, hours or days ago)       Suppose Sunday  4. SUDDEN: \"Gradual or sudden onset?\"      Came out of no where   5. PATTERN \"Does the pain come and go, or is it constant?\"     - If constant: \"Is it getting better, staying the same, or worsening?\"       (Note: Constant means the pain never goes away completely; most serious pain is constant " "and it progresses)      - If intermittent: \"How long does it last?\" \"Do you have pain now?\"      (Note: Intermittent means the pain goes away completely between bouts)      Constant, got better by 1%  6. SEVERITY: \"How bad is the pain?\"  (e.g., Scale 1-10; mild, moderate, or severe)    - MILD (1-3): doesn't interfere with normal activities, abdomen soft and not tender to touch     - MODERATE (4-7): interferes with normal activities or awakens from sleep, tender to touch     - SEVERE (8-10): excruciating pain, doubled over, unable to do any normal activities       9-   7. RECURRENT SYMPTOM: \"Have you ever had this type of abdominal pain before?\" If so, ask: \"When was the last time?\" and \"What happened that time?\"       No   8. CAUSE: \"What do you think is causing the abdominal pain?\"      Thought muscle pain - from painting   9. RELIEVING/AGGRAVATING FACTORS: \"What makes it better or worse?\" (e.g., movement, antacids, bowel movement)      Ibuprofen- does help   10. OTHER SYMPTOMS: \"Has there been any vomiting, diarrhea, constipation, or urine problems?\"        I might have a bladder infection. I have been trying to self treat it. Urinary symptoms- hurts when I urinate. Has been taking AZO- couple times last week then started again this week.   11. PREGNANCY: \"Is there any chance you are pregnant?\" \"When was your last menstrual period?\"        Na    Protocols used: ABDOMINAL PAIN - FEMALE-A-OH    Daylin FARR RN     "

## 2022-03-22 NOTE — TELEPHONE ENCOUNTER
Pt called in inquiring if there was a mistake with her refills on medication. Advised that there was 3 refills of 90 tablets issued 12/30/21. Pharmacy only gave her 4 tablets this last refill and she didn't know why. Advised her to contact pharmacy and speak to them.

## 2022-03-23 ENCOUNTER — OFFICE VISIT (OUTPATIENT)
Dept: FAMILY MEDICINE | Facility: CLINIC | Age: 57
End: 2022-03-23
Payer: COMMERCIAL

## 2022-03-23 ENCOUNTER — TELEPHONE (OUTPATIENT)
Dept: FAMILY MEDICINE | Facility: CLINIC | Age: 57
End: 2022-03-23

## 2022-03-23 VITALS
DIASTOLIC BLOOD PRESSURE: 68 MMHG | TEMPERATURE: 98.7 F | RESPIRATION RATE: 18 BRPM | HEART RATE: 86 BPM | SYSTOLIC BLOOD PRESSURE: 112 MMHG | OXYGEN SATURATION: 97 %

## 2022-03-23 DIAGNOSIS — K59.00 CONSTIPATION, UNSPECIFIED CONSTIPATION TYPE: ICD-10-CM

## 2022-03-23 DIAGNOSIS — N39.0 URINARY TRACT INFECTION WITHOUT HEMATURIA, SITE UNSPECIFIED: ICD-10-CM

## 2022-03-23 DIAGNOSIS — Z12.31 ENCOUNTER FOR SCREENING MAMMOGRAM FOR BREAST CANCER: ICD-10-CM

## 2022-03-23 DIAGNOSIS — Z12.11 COLON CANCER SCREENING: ICD-10-CM

## 2022-03-23 DIAGNOSIS — Z23 NEED FOR VACCINATION: ICD-10-CM

## 2022-03-23 DIAGNOSIS — R10.9 RIGHT FLANK PAIN: Primary | ICD-10-CM

## 2022-03-23 LAB
ALBUMIN SERPL-MCNC: 3.1 G/DL (ref 3.4–5)
ALBUMIN UR-MCNC: 30 MG/DL
ALP SERPL-CCNC: 70 U/L (ref 40–150)
ALT SERPL W P-5'-P-CCNC: 23 U/L (ref 0–50)
ANION GAP SERPL CALCULATED.3IONS-SCNC: 5 MMOL/L (ref 3–14)
APPEARANCE UR: ABNORMAL
AST SERPL W P-5'-P-CCNC: 18 U/L (ref 0–45)
BACTERIA #/AREA URNS HPF: ABNORMAL /HPF
BASOPHILS # BLD AUTO: 0 10E3/UL (ref 0–0.2)
BASOPHILS NFR BLD AUTO: 0 %
BILIRUB SERPL-MCNC: 0.5 MG/DL (ref 0.2–1.3)
BILIRUB UR QL STRIP: NEGATIVE
BUN SERPL-MCNC: 13 MG/DL (ref 7–30)
CALCIUM SERPL-MCNC: 8.9 MG/DL (ref 8.5–10.1)
CHLORIDE BLD-SCNC: 105 MMOL/L (ref 94–109)
CO2 SERPL-SCNC: 27 MMOL/L (ref 20–32)
COLOR UR AUTO: ABNORMAL
CREAT SERPL-MCNC: 0.72 MG/DL (ref 0.52–1.04)
CRP SERPL-MCNC: 125 MG/L (ref 0–8)
EOSINOPHIL # BLD AUTO: 0.1 10E3/UL (ref 0–0.7)
EOSINOPHIL NFR BLD AUTO: 1 %
ERYTHROCYTE [DISTWIDTH] IN BLOOD BY AUTOMATED COUNT: 13.2 % (ref 10–15)
ERYTHROCYTE [SEDIMENTATION RATE] IN BLOOD BY WESTERGREN METHOD: 25 MM/HR (ref 0–30)
GFR SERPL CREATININE-BSD FRML MDRD: >90 ML/MIN/1.73M2
GLUCOSE BLD-MCNC: 87 MG/DL (ref 70–99)
GLUCOSE UR STRIP-MCNC: NEGATIVE MG/DL
HCT VFR BLD AUTO: 41.6 % (ref 35–47)
HGB BLD-MCNC: 13.7 G/DL (ref 11.7–15.7)
HGB UR QL STRIP: ABNORMAL
IMM GRANULOCYTES # BLD: 0 10E3/UL
IMM GRANULOCYTES NFR BLD: 0 %
KETONES UR STRIP-MCNC: 80 MG/DL
LEUKOCYTE ESTERASE UR QL STRIP: ABNORMAL
LIPASE SERPL-CCNC: 77 U/L (ref 73–393)
LYMPHOCYTES # BLD AUTO: 0.9 10E3/UL (ref 0.8–5.3)
LYMPHOCYTES NFR BLD AUTO: 10 %
MCH RBC QN AUTO: 31.3 PG (ref 26.5–33)
MCHC RBC AUTO-ENTMCNC: 32.9 G/DL (ref 31.5–36.5)
MCV RBC AUTO: 95 FL (ref 78–100)
MONOCYTES # BLD AUTO: 1.5 10E3/UL (ref 0–1.3)
MONOCYTES NFR BLD AUTO: 16 %
MUCOUS THREADS #/AREA URNS LPF: PRESENT /LPF
NEUTROPHILS # BLD AUTO: 6.7 10E3/UL (ref 1.6–8.3)
NEUTROPHILS NFR BLD AUTO: 73 %
NITRATE UR QL: POSITIVE
NRBC # BLD AUTO: 0 10E3/UL
NRBC BLD AUTO-RTO: 0 /100
PH UR STRIP: 5 [PH] (ref 5–7)
PLATELET # BLD AUTO: 233 10E3/UL (ref 150–450)
POTASSIUM BLD-SCNC: 4 MMOL/L (ref 3.4–5.3)
PROT SERPL-MCNC: 6.7 G/DL (ref 6.8–8.8)
RBC # BLD AUTO: 4.38 10E6/UL (ref 3.8–5.2)
RBC URINE: 35 /HPF
SODIUM SERPL-SCNC: 137 MMOL/L (ref 133–144)
SP GR UR STRIP: 1.02 (ref 1–1.03)
SQUAMOUS EPITHELIAL: 1 /HPF
TRANSITIONAL EPI: 1 /HPF
UROBILINOGEN UR STRIP-MCNC: NORMAL MG/DL
WBC # BLD AUTO: 9.2 10E3/UL (ref 4–11)
WBC CLUMPS #/AREA URNS HPF: PRESENT /HPF
WBC URINE: >182 /HPF

## 2022-03-23 PROCEDURE — 87086 URINE CULTURE/COLONY COUNT: CPT | Performed by: FAMILY MEDICINE

## 2022-03-23 PROCEDURE — 81001 URINALYSIS AUTO W/SCOPE: CPT | Performed by: FAMILY MEDICINE

## 2022-03-23 PROCEDURE — 90471 IMMUNIZATION ADMIN: CPT | Performed by: FAMILY MEDICINE

## 2022-03-23 PROCEDURE — 90715 TDAP VACCINE 7 YRS/> IM: CPT | Performed by: FAMILY MEDICINE

## 2022-03-23 PROCEDURE — 87186 SC STD MICRODIL/AGAR DIL: CPT | Performed by: FAMILY MEDICINE

## 2022-03-23 PROCEDURE — 90472 IMMUNIZATION ADMIN EACH ADD: CPT | Performed by: FAMILY MEDICINE

## 2022-03-23 PROCEDURE — 83690 ASSAY OF LIPASE: CPT | Performed by: FAMILY MEDICINE

## 2022-03-23 PROCEDURE — 85652 RBC SED RATE AUTOMATED: CPT | Performed by: FAMILY MEDICINE

## 2022-03-23 PROCEDURE — 86140 C-REACTIVE PROTEIN: CPT | Performed by: FAMILY MEDICINE

## 2022-03-23 PROCEDURE — 80053 COMPREHEN METABOLIC PANEL: CPT | Performed by: FAMILY MEDICINE

## 2022-03-23 PROCEDURE — 36415 COLL VENOUS BLD VENIPUNCTURE: CPT | Performed by: FAMILY MEDICINE

## 2022-03-23 PROCEDURE — 99214 OFFICE O/P EST MOD 30 MIN: CPT | Mod: 25 | Performed by: FAMILY MEDICINE

## 2022-03-23 PROCEDURE — 85025 COMPLETE CBC W/AUTO DIFF WBC: CPT | Performed by: FAMILY MEDICINE

## 2022-03-23 PROCEDURE — 90750 HZV VACC RECOMBINANT IM: CPT | Performed by: FAMILY MEDICINE

## 2022-03-23 RX ORDER — CIPROFLOXACIN 500 MG/1
500 TABLET, FILM COATED ORAL 2 TIMES DAILY
Qty: 10 TABLET | Refills: 0 | Status: SHIPPED | OUTPATIENT
Start: 2022-03-23 | End: 2022-03-28

## 2022-03-23 RX ORDER — IBUPROFEN 800 MG/1
TABLET, FILM COATED ORAL
Qty: 60 TABLET | Refills: 3 | Status: SHIPPED | OUTPATIENT
Start: 2022-03-23 | End: 2023-11-27

## 2022-03-23 ASSESSMENT — PAIN SCALES - GENERAL: PAINLEVEL: NO PAIN (0)

## 2022-03-23 NOTE — TELEPHONE ENCOUNTER
Patient requesting lab results. Updated her on recent results and Dr. Summers's interpretation. Patient to call back with any further questions.    Bernice Nair RN

## 2022-03-23 NOTE — PROGRESS NOTES
Assessment & Plan       ICD-10-CM    1. Right flank pain  R10.9 Comprehensive metabolic panel (BMP + Alb, Alk Phos, ALT, AST, Total. Bili, TP)     Lipase     UA Macro with Reflex to Micro and Culture - lab collect     ibuprofen (ADVIL/MOTRIN) 800 MG tablet     CRP, inflammation     ESR: Erythrocyte sedimentation rate     CBC with platelets and differential     Comprehensive metabolic panel (BMP + Alb, Alk Phos, ALT, AST, Total. Bili, TP)     Lipase     UA Macro with Reflex to Micro and Culture - lab collect     CRP, inflammation     ESR: Erythrocyte sedimentation rate     CBC with platelets and differential     Urine Culture   2. Urinary tract infection without hematuria, site unspecified  N39.0 ciprofloxacin (CIPRO) 500 MG tablet   3. Constipation, unspecified constipation type  K59.00    4. Colon cancer screening  Z12.11 Adult Gastro Ref - Procedure Only   5. Encounter for screening mammogram for breast cancer  Z12.31 *MA Screening Digital Bilateral   6. Need for vaccination  Z23 SHINGRIX [[id number]]     TDAP VACCINE (Adacel, Boostrix)  [[id number]]      Lorraine presented for 4-day history of right flank pain.  She was moving over the weekend which involved with twisting, lifting, bending and climbing the ladders.  The pain is worse with movement and resolved with ibuprofen.  It is getting better slightly.  She is also has some burning sensation with urination.  Physical exam today was significant for right CVA tenderness.  She is afebrile and is not in acute distress.  Tolerates oral intake well.  She is also is known to have constipation.  Physical exam did not suggest of constipation induced abdominal pain.   I discussed with her about the potential causes of her pain and clinical presentation is more suggestive of skeletal muscle.  Physical exam however is concerned of UTI with possible early pyelonephritis.  I recommend basic lab work including UA, lipase, CBC, sed rate, CRP and CMP.  In the meantime, we will  have her continue with ibuprofen as needed for pain.  Encouraged to take with food.  Normal activity as tolerated.  Call in or follow-up if the symptoms persist or get worse.   In term for constipation, I strong encouraged her to increase fiber and water intake.  Also increase physical activity or exercising.  Continue with Dulcolax daily, hold only if develops diarrhea since he has chronic constipation.  She is due for colon cancer screening and therefore referral for colonoscopy and she agreed.    Labs in [facility] suggested of UTI.  Physical exam was concerning for early pyelonephritis.  She is tolerable to oral intake and does not look dehydrated.  Her kidney function and electrolytes were normal.  Will start her on Cipro for 500 mg twice a day for 5 days.  Encouraged to drink a lot of water.  Instructed follow-up immediately if develops intolerance to oral intake or if has any concern.  Pending for urine culture.    She got the shingles and Tdap vaccinations today.  Potential side effect discussed and recommended OTC meds as needed for symptomatic treatment.  She was also sent for mammogram for breast cancer screening.         Return if symptoms worsen or fail to improve.    Jazlyn Granados Mai, MD  [facility]    Cesar Peters is a 56 year old who presents for the following health issues     HPI     Abd pain, on right side. No vomitting, no diarrhea. Sx for 4 days.     Lorraine is here today for right flank pain in the last 4 days, which is getting better slightly.  She moved over the weekend and was bending, lifting, and going up and down the ladder extensively.  Since then, she has been having the sharp pain on her right back and abdomen.  It is a sharp constant pain that is worse with any movement of her back.  Not radiating to the groin but mainly localized around the kidney area.  It was really bad yesterday; is better today.  Ibuprofen worked well.  She is concerned of bladder infection or  perhaps some bad constipation.  Has history of UTI.  Been having the burning sensation with urination without urinary frequency or urgency.  No obvious hematuria.  No history of kidney stone.  Has chronic constipation - not worse than usual and usually controlled with Dulcolax.  Last bowel movement was couple days ago.  No nausea or vomiting. No fever or chills.  No wall lower back pain.  No change in her diet.  No other concern.    Review of Systems   Constitutional, HEENT, cardiovascular, pulmonary, gi and gu systems are negative, except as otherwise noted.      Objective    /68   Pulse 86   Temp 98.7  F (37.1  C) (Temporal)   Resp 18   SpO2 97%   Breastfeeding No   There is no height or weight on file to calculate BMI.  Physical Exam   GENERAL: alert and no distress, comfortable sitting on a chair, speaking in full sentences..  RESP: lungs clear to auscultation - no rales, rhonchi or wheezes  CV: regular rate and rhythm, normal S1 S2, no S3 or S4, no murmur, click or rub, no peripheral edema  ABDOMEN: soft, nondistended, no palpable masses organomegaly with normal bowel sound.  Tender without guarding or rebound with palpation to the right flank, around the CVA area.  No tender with palpation to the groin or suprapelvic area.  No tender with palpation to the epigastric area.  Normal bowel sounds.  MS: no gross musculoskeletal defects noted, no edema.  No tender with rotation to the lumbar sacral spine.  Mild tender with palpation to the right paraspinous muscle.  No tender palpation to the rib cage.  Hips exam was normal.    Results for orders placed or performed in visit on 03/23/22   Comprehensive metabolic panel (BMP + Alb, Alk Phos, ALT, AST, Total. Bili, TP)     Status: Abnormal   Result Value Ref Range    Sodium 137 133 - 144 mmol/L    Potassium 4.0 3.4 - 5.3 mmol/L    Chloride 105 94 - 109 mmol/L    Carbon Dioxide (CO2) 27 20 - 32 mmol/L    Anion Gap 5 3 - 14 mmol/L    Urea Nitrogen 13 7 - 30  mg/dL    Creatinine 0.72 0.52 - 1.04 mg/dL    Calcium 8.9 8.5 - 10.1 mg/dL    Glucose 87 70 - 99 mg/dL    Alkaline Phosphatase 70 40 - 150 U/L    AST 18 0 - 45 U/L    ALT 23 0 - 50 U/L    Protein Total 6.7 (L) 6.8 - 8.8 g/dL    Albumin 3.1 (L) 3.4 - 5.0 g/dL    Bilirubin Total 0.5 0.2 - 1.3 mg/dL    GFR Estimate >90 >60 mL/min/1.73m2   Lipase     Status: Normal   Result Value Ref Range    Lipase 77 73 - 393 U/L   UA Macro with Reflex to Micro and Culture - lab collect     Status: Abnormal    Specimen: Urine, NOS   Result Value Ref Range    Color Urine Amparo (A) Colorless, Straw, Light Yellow, Yellow    Appearance Urine Cloudy (A) Clear    Glucose Urine Negative Negative mg/dL    Bilirubin Urine Negative Negative    Ketones Urine 80  (A) Negative mg/dL    Specific Gravity Urine 1.017 1.003 - 1.035    Blood Urine Large (A) Negative    pH Urine 5.0 5.0 - 7.0    Protein Albumin Urine 30  (A) Negative mg/dL    Urobilinogen Urine Normal Normal, 2.0 mg/dL    Nitrite Urine Positive (A) Negative    Leukocyte Esterase Urine Large (A) Negative    Bacteria Urine Many (A) None Seen /HPF    WBC Clumps Urine Present (A) None Seen /HPF    Mucus Urine Present (A) None Seen /LPF    RBC Urine 35 (H) <=2 /HPF    WBC Urine >182 (H) <=5 /HPF    Squamous Epithelials Urine 1 <=1 /HPF    Transitional Epithelials Urine 1 <=1 /HPF    Narrative    Urine Culture ordered based on laboratory criteria   CRP, inflammation     Status: Abnormal   Result Value Ref Range    CRP Inflammation 125.0 (H) 0.0 - 8.0 mg/L   ESR: Erythrocyte sedimentation rate     Status: Normal   Result Value Ref Range    Erythrocyte Sedimentation Rate 25 0 - 30 mm/hr   CBC with platelets and differential     Status: Abnormal   Result Value Ref Range    WBC Count 9.2 4.0 - 11.0 10e3/uL    RBC Count 4.38 3.80 - 5.20 10e6/uL    Hemoglobin 13.7 11.7 - 15.7 g/dL    Hematocrit 41.6 35.0 - 47.0 %    MCV 95 78 - 100 fL    MCH 31.3 26.5 - 33.0 pg    MCHC 32.9 31.5 - 36.5 g/dL    RDW  13.2 10.0 - 15.0 %    Platelet Count 233 150 - 450 10e3/uL    % Neutrophils 73 %    % Lymphocytes 10 %    % Monocytes 16 %    % Eosinophils 1 %    % Basophils 0 %    % Immature Granulocytes 0 %    NRBCs per 100 WBC 0 <1 /100    Absolute Neutrophils 6.7 1.6 - 8.3 10e3/uL    Absolute Lymphocytes 0.9 0.8 - 5.3 10e3/uL    Absolute Monocytes 1.5 (H) 0.0 - 1.3 10e3/uL    Absolute Eosinophils 0.1 0.0 - 0.7 10e3/uL    Absolute Basophils 0.0 0.0 - 0.2 10e3/uL    Absolute Immature Granulocytes 0.0 <=0.4 10e3/uL    Absolute NRBCs 0.0 10e3/uL   CBC with platelets and differential     Status: Abnormal    Narrative    The following orders were created for panel order CBC with platelets and differential.  Procedure                               Abnormality         Status                     ---------                               -----------         ------                     CBC with platelets and d...[[id number]]  Abnormal            Final result                 Please view results for these tests on the individual orders.

## 2022-03-25 DIAGNOSIS — N39.0 E. COLI UTI: Primary | ICD-10-CM

## 2022-03-25 DIAGNOSIS — B96.20 E. COLI UTI: Primary | ICD-10-CM

## 2022-03-25 RX ORDER — SULFAMETHOXAZOLE/TRIMETHOPRIM 800-160 MG
1 TABLET ORAL 2 TIMES DAILY
Qty: 10 TABLET | Refills: 0 | Status: SHIPPED | OUTPATIENT
Start: 2022-03-25 | End: 2022-03-30

## 2022-03-26 LAB — BACTERIA UR CULT: ABNORMAL

## 2022-03-28 ENCOUNTER — HOSPITAL ENCOUNTER (OUTPATIENT)
Facility: CLINIC | Age: 57
End: 2022-03-28
Attending: INTERNAL MEDICINE | Admitting: INTERNAL MEDICINE
Payer: COMMERCIAL

## 2022-03-28 ENCOUNTER — TELEPHONE (OUTPATIENT)
Dept: GASTROENTEROLOGY | Facility: CLINIC | Age: 57
End: 2022-03-28
Payer: COMMERCIAL

## 2022-03-28 DIAGNOSIS — Z11.59 ENCOUNTER FOR SCREENING FOR OTHER VIRAL DISEASES: Primary | ICD-10-CM

## 2022-03-28 NOTE — TELEPHONE ENCOUNTER
Screening Questions  BlueKIND OF PREP RedLOCATION [review exclusion criteria] GreenSEDATION TYPE  1. Have you had a positive covid test in the last 90 days? N     2. Are you active on mychart? Y    3. What insurance is in the chart? [employer]     3.   Ordering/Referring Provider: Jazlyn Summers,     4. BMI 22.1  [BMI OVER 40-EXTENDED PREP]  If greater than 40 review exclusion criteria [PAC APPT IF @ [facility]]        5.  Respiratory Screening :  [If yes to any of the following HOSPITAL setting only]     Do you use daily home oxygen? N    Do you have mod to severe Obstructive Sleep Apnea? N  [OKAY @ [facility] SH PH RI]   Do you have Pulmonary Hypertension? N     Do you have UNCONTROLLED asthma? N        6.   Have you had a heart or lung transplant? N      7.   Are you currently on dialysis? N [ If yes, G-PREP & HOSPITAL setting only]     8.   Do you have chronic kidney disease? N [ If yes, G-PREP ]    9.   Have you had a stroke or Transient ischemic attack (TIA - aka  mini stroke ) within 6 months?  N (If yes, please review exclusion criteria)    10.   In the past 6 months, have you had any heart related issues including cardiomyopathy or heart attack? N           If yes, did it require cardiac stenting or other implantable device? N      11.   Do you have any implantable devices in your body (pacemaker, defib, LVAD)? N (If yes, please review exclusion criteria)    12.   Do you take nitroglycerin? N           If yes, how often? N  (if yes, HOSPITAL setting ONLY)    13.   Are you currently taking any blood thinners? N           [IF YES, INFORM PATIENT TO FOLLOW UP W/ ORDERING PROVIDER FOR BRIDGING INSTRUCTIONS]     14.   Do you have a diagnosis of diabetes? N   [ If yes, G-PREP ]    15.   [FEMALES] Are you currently pregnant? N    If yes, how many weeks? N    16.   Are you taking any prescription pain medications on a routine schedule?  N  [ If yes, EXTENDED PREP.] [If yes, MAC]    17.   Do you have any chemical  dependencies such as alcohol, street drugs, or methadone?  N [If yes, MAC]    18.   Do you have any history of post-traumatic stress syndrome, severe anxiety or history of psychosis?  N  [If yes, MAC]    19.   Do you have a significant intellectual disability?  N [If yes, MAC]    20.   Do you transfer independently?  Y    21.  On a regular basis do you go 3-5 days between bowel movements? N   [ If yes, EXTENDED PREP.]    22.   Preferred LOCAL Pharmacy for Pre Prescription Y     [facility] [address]  [facility] [address] [place] [address] AVE N      Scheduling Details      Caller : Lorraine  (Please ask for phone number if not scheduled by patient)    Type of Procedure Scheduled: Colon  Which Colonoscopy Prep was Sent?: M Prep  KHORUTS CF PATIENTS & GROEN'S PATIENTS NEEDS EXTENDED PREP  Surgeon:   Date of Procedure:   Location:       Sedation Type: CS  Conscious Sedation- Needs  for 6 hours after the procedure  MAC/General-Needs  for 24 hours after procedure    Pre-op Required at [facility] [place], [place] and OR for MAC sedation: Y  (advise patient they will need a pre-op prior to procedure -)      Informed patient they will need an adult  Y  Cannot take any type of public or medical transportation alone    [facility]Pro[facility] or Externally: Y    Confirmed Nurse will call to complete assessment Y    Additional comments:           (1) More than 48 hours/None

## 2022-03-29 ENCOUNTER — TELEPHONE (OUTPATIENT)
Dept: GASTROENTEROLOGY | Facility: CLINIC | Age: 57
End: 2022-03-29
Payer: COMMERCIAL

## 2022-03-29 NOTE — TELEPHONE ENCOUNTER
Caller: rima    Procedure: colon    Date, Location, and Surgeon of Procedure Cancelled: 4/21  [facility]    Ordering Provider:Jazlyn Summers MD    Reason for cancel (please be detailed, any staff messages or encounters to note?): to much on plate will call back when ready        Rescheduled: n     If rescheduled:    Date:    Location:    Note any change or update to original order/sedation:

## 2022-04-12 ENCOUNTER — PATIENT OUTREACH (OUTPATIENT)
Dept: FAMILY MEDICINE | Facility: CLINIC | Age: 57
End: 2022-04-12
Payer: COMMERCIAL

## 2022-04-12 DIAGNOSIS — R87.810 CERVICAL HIGH RISK HPV (HUMAN PAPILLOMAVIRUS) TEST POSITIVE: ICD-10-CM

## 2022-04-28 NOTE — TELEPHONE ENCOUNTER
4/25/22 In Transit, Arriving Late  Your package will arrive later than expected, but is still on its way. It is currently in transit to the next facility.

## 2022-05-07 ENCOUNTER — HEALTH MAINTENANCE LETTER (OUTPATIENT)
Age: 57
End: 2022-05-07

## 2022-05-11 NOTE — TELEPHONE ENCOUNTER
Jay Simon,   Perhaps she needs a new certified letter? The current one has been in transit for over two months?    Thanks!  Luis Wilson, BSN, RN  Pap Tracking Nurse

## 2022-06-17 ENCOUNTER — TELEPHONE (OUTPATIENT)
Dept: FAMILY MEDICINE | Facility: CLINIC | Age: 57
End: 2022-06-17
Payer: COMMERCIAL

## 2022-06-17 NOTE — LETTER
[address] [address]  [phone]        June 17, 2022    Lorraine Hunter  [address] [place] [id number]          Dear Lorraine,    We have attempted to reach you by phone in regards to your care. Dr. Summers has reccommended that you get your mammogram completed on a yearly basis and this is over due. Please call the clinic at [phone] to schedule. Your are also due for you annual wellness please call clinic at [phone] to schedule.     Sincerely,        Jazlyn Summers MD   Care Team

## 2022-06-17 NOTE — TELEPHONE ENCOUNTER
Called and LM for patient to call back. Please relay below and help schedule appointment.   Ximena Salazar MA

## 2022-06-17 NOTE — TELEPHONE ENCOUNTER
Please let patient know that I recommend her to get a mammogram at her earliest convenience.  Her last one was in 2020 and she is recommended to get it done at least once every other year.  I will extend the order and please help her to schedule for one if she is interested.  Also remind her that she is due for preventive care and recommend to follow up for the physical with her doctor in the couple months as discussed at her last apt.  thanks

## 2022-10-12 ENCOUNTER — TELEPHONE (OUTPATIENT)
Dept: FAMILY MEDICINE | Facility: CLINIC | Age: 57
End: 2022-10-12

## 2022-10-13 NOTE — TELEPHONE ENCOUNTER
Please let patient know that I am extending her mammogram order, please get it done asap to screen for breast cancer.  Her last one was on 2020; she is 1 years overdue.  Thanks.  Jazlyn

## 2022-12-26 ENCOUNTER — HEALTH MAINTENANCE LETTER (OUTPATIENT)
Age: 57
End: 2022-12-26

## 2023-01-15 ENCOUNTER — HOSPITAL ENCOUNTER (EMERGENCY)
Facility: CLINIC | Age: 58
Discharge: HOME | End: 2023-01-15
Attending: EMERGENCY MEDICINE | Admitting: EMERGENCY MEDICINE
Payer: COMMERCIAL

## 2023-01-15 ENCOUNTER — APPOINTMENT (OUTPATIENT)
Dept: GENERAL RADIOLOGY | Facility: CLINIC | Age: 58
End: 2023-01-15
Attending: EMERGENCY MEDICINE
Payer: COMMERCIAL

## 2023-01-15 VITALS
WEIGHT: 125 LBS | SYSTOLIC BLOOD PRESSURE: 98 MMHG | RESPIRATION RATE: 16 BRPM | BODY MASS INDEX: 22.14 KG/M2 | DIASTOLIC BLOOD PRESSURE: 62 MMHG | OXYGEN SATURATION: 98 % | HEART RATE: 62 BPM | TEMPERATURE: 98.4 F

## 2023-01-15 DIAGNOSIS — S52.502A CLOSED FRACTURE OF DISTAL END OF LEFT RADIUS, UNSPECIFIED FRACTURE MORPHOLOGY, INITIAL ENCOUNTER: ICD-10-CM

## 2023-01-15 PROCEDURE — 25600 CLTX DST RDL FX/EPHYS SEP WO: CPT | Mod: 54 | Performed by: EMERGENCY MEDICINE

## 2023-01-15 PROCEDURE — 73110 X-RAY EXAM OF WRIST: CPT | Mod: LT

## 2023-01-15 PROCEDURE — 25600 CLTX DST RDL FX/EPHYS SEP WO: CPT | Mod: LT | Performed by: EMERGENCY MEDICINE

## 2023-01-15 PROCEDURE — 99283 EMERGENCY DEPT VISIT LOW MDM: CPT | Mod: 25 | Performed by: EMERGENCY MEDICINE

## 2023-01-15 PROCEDURE — 99284 EMERGENCY DEPT VISIT MOD MDM: CPT | Mod: 25 | Performed by: EMERGENCY MEDICINE

## 2023-01-15 RX ORDER — OXYCODONE HYDROCHLORIDE 5 MG/1
5 TABLET ORAL EVERY 6 HOURS PRN
Qty: 12 TABLET | Refills: 0 | Status: SHIPPED | OUTPATIENT
Start: 2023-01-15 | End: 2023-01-31

## 2023-01-16 NOTE — ED TRIAGE NOTES
PT FELL ON ICE, 30 MINUTES AGO.  PAIN TO LEFT WRIST.         Triage Assessment     Row Name 01/15/23 1926       Triage Assessment (Adult)    Airway WDL WDL       Respiratory WDL    Respiratory WDL WDL

## 2023-01-16 NOTE — ED PROVIDER NOTES
History     Chief Complaint   Patient presents with     Fall     Arm Pain     HPI  Lorraine Hunter is a 57 year old female who was taking the garbage out and slipped and fell on an outstretched left hand resulting in a wirst injury.  Pain is locted over the distal left wrist. Hurts to flex and extend the wrist.  Tingling into the fingers.  No laceration, head injury, neck injury etc.    Allergies:  No Known Allergies    Problem List:    Patient Active Problem List    Diagnosis Date Noted     History of cold sores 09/03/2014     Priority: Medium     Adenomatous polyp of colon 07/01/2014     Priority: Medium     q 5 yr colonoscopys       CARDIOVASCULAR SCREENING; LDL GOAL LESS THAN 160 12/20/2010     Priority: Medium     Diffuse cystic mastopathy 08/22/2007     Priority: Medium     Cervical high risk HPV (human papillomavirus) test positive 08/22/2007     Priority: Medium     8/22/07 ASCUS pap, + HR HPV 53.  9/28/07 [facility]- Negative  2008, 2009, 2012 NIL paps, Neg HPV.  2015 NIL pap.  9/14/18 NIL pap, + HR HPV (not 16 or 18). Plan cotest in 1 year  11/11/19 Lost to follow-up for pap tracking  3/5/20 NIL pap, +HR HPV (not 16/18). Plan: colposcopy  11/30/20 Lost to follow-up for pap tracking  12/30/21 ASCUS pap, + HR HPV (not 16 or 18). Plan: [facility]ertner bef 3/30/22   1/6/22 left msg and sent paper letter  1/14/22 left msg.   1/25/22 left msg.   3/3/22 Certified ltr sent. Tracking # [id number]  3/25/22 Tracking says in transit    4/12/22 Tracking says: In transit, arriving late  4/25/22 In Transit, Arriving Late Your package will arrive later than expected, but is still on its way. It is currently in transit to the next facility.  5/11/22 Certified letter still in transit  5/20/22 Certified ltr sent. Tracking # [id number]  6/19/22 letter in transit  7/5/22 Lost to follow-up for pap tracking                  Past Medical History:    Past Medical History:   Diagnosis Date     Abnormal Pap smear of cervix  08/22/2007     Adenomatous polyp of colon 7/2014     Cervical high risk HPV (human papillomavirus) test positive 8/22/07, 9/14/18     HSV-1 infection        Past Surgical History:    Past Surgical History:   Procedure Laterality Date     COLONOSCOPY  7/11/2014    Procedure: COMBINED COLONOSCOPY, SINGLE BIOPSY/POLYPECTOMY BY BIOPSY;  Surgeon: Mendoza Clifford MD;  Location:  GI     DILATION AND CURETTAGE, HYSTEROSCOPY, ABLATE ENDOMETRIUM NOVASURE, COMBINED  6/26/2012    Procedure: COMBINED DILATION AND CURETTAGE, HYSTEROSCOPY, ABLATE ENDOMETRIUM NOVASURE;  Hysteroscopy Dilation and Curettage, Novasure;  Surgeon: Bola Calhoun MD;  Location: [facility]     NO HISTORY OF SURGERY         Family History:    Family History   Problem Relation Age of Onset     Family History Negative No family hx of        Social History:  Marital Status:   [2]  Social History     Tobacco Use     Smoking status: Former     Packs/day: 0.50     Years: 20.00     Pack years: 10.00     Types: Cigarettes     Quit date: 12/27/2012     Years since quitting: 10.0     Smokeless tobacco: Never   Substance Use Topics     Alcohol use: Yes     Alcohol/week: 10.0 standard drinks     Comment: glass of wine daily and few on weekends     Drug use: No        Medications:    oxyCODONE (ROXICODONE) 5 MG tablet  calcium carbonate 600 mg-vitamin D 400 units (CALTRATE) 600-400 MG-UNIT per tablet  ibuprofen (ADVIL/MOTRIN) 800 MG tablet  NONFORMULARY  valACYclovir (VALTREX) 500 MG tablet          Review of Systems   All other systems reviewed and are negative.      Physical Exam   BP: 101/76  Pulse: 64  Temp: 98.4  F (36.9  C)  Resp: 16  Weight: 56.7 kg (125 lb)  SpO2: 98 %      Physical Exam  Vitals and nursing note reviewed.   Constitutional:       General: She is not in acute distress.     Appearance: Normal appearance. She is well-developed. She is not diaphoretic.   HENT:      Head: Normocephalic and atraumatic.      Right Ear: External ear  normal.      Left Ear: External ear normal.      Nose: Nose normal.      Mouth/Throat:      Mouth: Mucous membranes are moist.   Eyes:      General: No scleral icterus.     Extraocular Movements: Extraocular movements intact.      Conjunctiva/sclera: Conjunctivae normal.      Pupils: Pupils are equal, round, and reactive to light.   Cardiovascular:      Rate and Rhythm: Normal rate.   Pulmonary:      Effort: Pulmonary effort is normal.   Musculoskeletal:         General: Swelling and tenderness present. No deformity.      Cervical back: Normal range of motion and neck supple.      Comments: Decreased range of motion of the left wrist with ttp over the distal left wrist over the radius and ulna. No snuff box tenderness. No laceration or significant deformity.  No numbness.  Can move fingers well.     Skin:     General: Skin is warm and dry.      Coloration: Skin is not pale.      Findings: No erythema or rash.   Neurological:      General: No focal deficit present.      Mental Status: She is alert and oriented to person, place, and time.   Psychiatric:         Mood and Affect: Mood normal.         ED Course                 Procedures           Results for orders placed or performed during the hospital encounter of 01/15/23 (from the past 24 hour(s))   XR Wrist Left G/E 3 Views    Narrative    EXAM: XR WRIST LEFT G/E 3 VIEWS  LOCATION: [facility]  DATE/TIME: 1/15/2023 7:50 PM    INDICATION: fall wrist pain  COMPARISON: None.      Impression    IMPRESSION: Acute mildly displaced fracture of the distal left radius without angulation of the fracture fragments. No additional fractures. No degenerative changes.       Medications - No data to display    Assessments & Plan (with Medical Decision Making)  57 yr old with distal radius/ulna injury  Xray shows: distal radius fracture without significant displacement  Patient wants to drive home so will give insty med prescription for home.  Return  to er precautions and follow up precautions discussed.  Sugar tong splint and sling place by ED tech.  Referral to orthopedics in a week.  Oxycodone for pain. All questions answered prior to discharge.      I have reviewed the nursing notes.    I have reviewed the findings, diagnosis, plan and need for follow up with the patient.      Medical Decision Making  The patient presented with a problem that is acute and uncomplicated.    The patient's evaluation involved:  ordering and review of 1 test(s) (see separate area of note for details)    The patient's management involved prescription drug management.        New Prescriptions    OXYCODONE (ROXICODONE) 5 MG TABLET    Take 1 tablet (5 mg) by mouth every 6 hours as needed for severe pain (7-10)       Final diagnoses:   Closed fracture of distal end of left radius, unspecified fracture morphology, initial encounter       1/15/2023   [facility] EMERGENCY DEPT     Vin Aggarwal MD  01/15/23 2008

## 2023-01-16 NOTE — DISCHARGE INSTRUCTIONS
No driving if taking the pain pills  Return to er if new or worsening symptoms  Follow up with orthopedics in a week approximately  I hope you heal up quickly  The radiologist reviewed the xray and agreed with my findings.  It was a pleasure to meet you.

## 2023-01-17 ENCOUNTER — TELEPHONE (OUTPATIENT)
Dept: ORTHOPEDICS | Facility: CLINIC | Age: 58
End: 2023-01-17

## 2023-01-17 NOTE — TELEPHONE ENCOUNTER
patient called back. I moved her to next week to give her arm so time to decrease the edema.  The ER note said see next week but the referral said in next opening.      Lorraine said her splint is doing just fine and not having any issues. Thank you for saving me a trip.     I did inform her to call us if she started having issues.     Erica/TETO

## 2023-01-24 ENCOUNTER — OFFICE VISIT (OUTPATIENT)
Dept: ORTHOPEDICS | Facility: CLINIC | Age: 58
End: 2023-01-24
Attending: EMERGENCY MEDICINE
Payer: COMMERCIAL

## 2023-01-24 ENCOUNTER — ANCILLARY PROCEDURE (OUTPATIENT)
Dept: GENERAL RADIOLOGY | Facility: CLINIC | Age: 58
End: 2023-01-24
Attending: PHYSICIAN ASSISTANT
Payer: COMMERCIAL

## 2023-01-24 VITALS — SYSTOLIC BLOOD PRESSURE: 126 MMHG | DIASTOLIC BLOOD PRESSURE: 80 MMHG

## 2023-01-24 DIAGNOSIS — S52.532A CLOSED COLLES' FRACTURE OF LEFT RADIUS, INITIAL ENCOUNTER: Primary | ICD-10-CM

## 2023-01-24 DIAGNOSIS — S69.92XA LEFT WRIST INJURY: ICD-10-CM

## 2023-01-24 DIAGNOSIS — S52.532A CLOSED COLLES' FRACTURE OF LEFT RADIUS, INITIAL ENCOUNTER: ICD-10-CM

## 2023-01-24 PROCEDURE — 25600 CLTX DST RDL FX/EPHYS SEP WO: CPT | Mod: 55 | Performed by: PHYSICIAN ASSISTANT

## 2023-01-24 PROCEDURE — 99203 OFFICE O/P NEW LOW 30 MIN: CPT | Mod: 57 | Performed by: PHYSICIAN ASSISTANT

## 2023-01-24 PROCEDURE — 73110 X-RAY EXAM OF WRIST: CPT | Mod: TC | Performed by: RADIOLOGY

## 2023-01-24 ASSESSMENT — PAIN SCALES - GENERAL: PAINLEVEL: SEVERE PAIN (7)

## 2023-01-24 NOTE — PROGRESS NOTES
ORTHOPEDIC CONSULT      Chief Complaint: Lorraine Hunter is a 57 year old right hand dominant female who works at [employer] doing [employer].  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    She is being seen for   Chief Complaints and History of Present Illnesses   Patient presents with     Musculoskeletal Problem     Left wrist injury DOI: 1/15/2023     Consult     Ref: ED     I reviewed the note from Dr. Aggarwal from the emergency department visit on 1/15/2023 in detail.    History of Present Illness:   Mechanism of Injury: Taking garbage out of the garage and slipped and fell on outstretched hand.  Location: Left distal radius  Duration of Pain: Since date of injury which was 1/15/2023  Rating of Pain: 7 out of 10  Pain Quality: Achy  Pain is better with: Rest and splint  Pain is worse with: Bumping wrist  Treatment so far consists of: Patient was seen on 1/15/2023 in the emergency department.  X-ray was done at that time and fracture was noted with no displacement.  Sugar-tong splint was put on and referral to orthopedics was done.  Patient did get some oxycodone for pain, 12 tablets.  Associated Features: Denies numbness or tingling shooting burning or electric pain.  Prior history of related problems: No previous major injury or surgery to the left wrist or fracture.  Pain is Limiting: Use of left upper extremity  Here to: Orthopedic consultation  The Pain Has: Been about the same  Additional History: None      Patient's past medical, surgical, social and family histories reviewed.     Past Medical History:   Diagnosis Date     Abnormal Pap smear of cervix 08/22/2007    See problem list     Adenomatous polyp of colon 7/2014    q 5 yr colonoscopys     Cervical high risk HPV (human papillomavirus) test positive 8/22/07, 9/14/18    See problem list     HSV-1 infection     cold sores on lip        Past Surgical History:   Procedure Laterality Date     COLONOSCOPY  7/11/2014     Procedure: COMBINED COLONOSCOPY, SINGLE BIOPSY/POLYPECTOMY BY BIOPSY;  Surgeon: Mendoza Clifford MD;  Location:  GI     DILATION AND CURETTAGE, HYSTEROSCOPY, ABLATE ENDOMETRIUM NOVASURE, COMBINED  6/26/2012    Procedure: COMBINED DILATION AND CURETTAGE, HYSTEROSCOPY, ABLATE ENDOMETRIUM NOVASURE;  Hysteroscopy Dilation and Curettage, Novasure;  Surgeon: Bola Calhoun MD;  Location:  OR     NO HISTORY OF SURGERY         Medications:  calcium carbonate 600 mg-vitamin D 400 units (CALTRATE) 600-400 MG-UNIT per tablet, Take 1 tablet by mouth 2 times daily  ibuprofen (ADVIL/MOTRIN) 800 MG tablet, TAKE 1 TABLET BY MOUTH EVERY 8 HOURS AS NEEDED FOR MODERATE PAIN  NONFORMULARY, Vitamin D  valACYclovir (VALTREX) 500 MG tablet, Take 1 tablet by mouth once daily  oxyCODONE (ROXICODONE) 5 MG tablet, Take 1 tablet (5 mg) by mouth every 6 hours as needed for severe pain (7-10) (Patient not taking: Reported on 1/24/2023)    No current facility-administered medications on file prior to visit.      No Known Allergies    Social History     Occupational History     Not on file   Tobacco Use     Smoking status: Former     Packs/day: 0.50     Years: 20.00     Pack years: 10.00     Types: Cigarettes     Quit date: 12/27/2012     Years since quitting: 10.0     Smokeless tobacco: Never   Substance and Sexual Activity     Alcohol use: Yes     Alcohol/week: 10.0 standard drinks     Comment: glass of wine daily and few on weekends     Drug use: No     Sexual activity: Yes     Partners: Male     Birth control/protection: Surgical       Family History   Problem Relation Age of Onset     Family History Negative No family hx of      No pertinent family history     REVIEW OF SYSTEMS  10 point review systems performed otherwise negative as noted as per history of present illness.    Physical Exam:  Vitals: /80   BMI= There is no height or weight on file to calculate BMI.    Constitutional: healthy, alert and no acute  distress   Psychiatric: mentation appears normal and affect normal/bright  NEURO: no focal deficits, CMS intact left upper extremity   RESP: Normal with easy respirations and no use of accessory muscles to breathe, no audible wheezing or retractions  CV: +2 radial pulse and her hand is warm to palpation.   SKIN: No erythema, rashes, excoriation, or breakdown. No evidence of infection.  No cast abrasions/splint abrasions  MUSCULOSKELETAL:    INSPECTION of left wrist: No gross deformities, erythema, edema, ecchymosis, atrophy or fasciculations.     PALPATION: Tenderness to palpation of the distal radius.  No tenderness to palpation over the distal ulna, no tenderness to palpation of the hand or digits or forearm.  No increased warmth.     ROM: Able to move all 5 digits without catching locking or pain.  I did not have the patient do wrist range of motion today.     STRENGTH: +4 out of 5 , interosseous and thumb without pain.     SPECIAL TEST: DRUJ stable  GAIT: non-antalgic  Lymph: no palpable lymph nodes    Diagnostic Modalities:  Recent Results (from the past 744 hour(s))   XR Wrist Left G/E 3 Views    Narrative    EXAM: XR WRIST LEFT G/E 3 VIEWS  LOCATION: [facility]  DATE/TIME: 1/15/2023 7:50 PM    INDICATION: fall wrist pain  COMPARISON: None.      Impression    IMPRESSION: Acute mildly displaced fracture of the distal left radius without angulation of the fracture fragments. No additional fractures. No degenerative changes.     I agree with the above reading.    X-rays done today showing distal radius fracture with no change in alignment from previous x-rays done on 1/15/2023.  I do feel that on the AP view in the central portion of the fracture there is some mineralization that has started.  No other fracture dislocation or tumor.  Volar tilt is neutral as it was on the previous x-rays also.    X-rays done after casting and mold applied.  Mold looks appropriate in the  appropriate area.  Volar tilt appears to be about neutral still.  No other fracture dislocation or tumor.    Independent visualization of the images was performed.    Impression: 1.  9 days status post left distal radius fracture with displacement dorsally and comminution    Plan:  All of the above pertinent physical exam and imaging modalities findings was reviewed with Lorraine.    Cast/splint application    Date/Time: 1/24/2023 4:35 PM  Performed by: Christopher Benjamin PA-C  Authorized by: Christopher Benjamin PA-C     Consent:     Consent obtained:  Verbal    Consent given by:  Patient    Risks discussed:  Discoloration, numbness, pain and swelling    Alternatives discussed:  No treatment, delayed treatment, alternative treatment, observation and referral  Pre-procedure details:     Sensation:  Normal  Procedure details:     Laterality:  Left    Location:  Wrist    Wrist:  L wrist    Cast type:  Short arm    Supplies:  Fiberglass  Post-procedure details:     Pain:  Improved    Sensation:  Normal    Patient tolerance of procedure:  Tolerated well, no immediate complications    Patient provided with cast or splint care instructions: Yes    Comments:      CAST APPLICATION:  On today's visit a well padded Fiberglass short arm cast was applied. I use stockinette and cast padding prior to applying the fiberglass. I applied a mold for comfort and good fit, I also put on a three-point mold to help the distal radius from displacing any more dorsally. Christopher Benjamin PA-C        FOCUSED PLAN:  57-year-old female 9 days out from left distal radius fracture with comminution and dorsal displacement.  Reviewed x-rays showing the distal radius with volar tilt at neutral and unchanged from previous x-rays.  We decided to cast a short arm cast and three-point mold.  Patient tolerated this well today.  We will plan on immobilization for another 4 weeks for a total of almost 6 weeks.  Follow-up in 4 weeks and we will get the cast off in order  hand therapy and hopefully at that point she will not need any immobilization but if she is still having some pain we could do a removable splint.  Follow-up in 4 weeks.    Re-x-ray on return: Yes AP lateral and oblique view of the left wrist outside of the cast.    ADDENDUM:  I did have some concern on the dorsal angle after the patient left and I ran the case by Dr. Anglin.  He stated most likely if the dorsal tilt stays where it is we will be okay however it is questionable.  If anything ever needed to happen in the future would be an osteotomy but if it stays in place then we should be good.  I reset the patient to have her come back in 2 weeks for recheck to make sure the dorsal tilt stays in place.  I left her a voicemail with all information above.  I also have our  reach out to her to get her scheduled in 1 to 2 weeks.    This note was dictated with Novatris.    Christopher Benjamin PA-C

## 2023-01-24 NOTE — LETTER
1/24/2023         RE: Lorraine Hunter  [address] [place] [id number]        Dear Colleague,    Thank you for referring your patient, Lorraine Hunter, to the [facility]. Please see a copy of my visit note below.    ORTHOPEDIC CONSULT      Chief Complaint: Lorraine Hunter is a 57 year old right hand dominant female who works at [employer] doing [employer].  She also works at the [employer] off of [facility] going towards [employer].    She is being seen for   Chief Complaints and History of Present Illnesses   Patient presents with     Musculoskeletal Problem     Left wrist injury DOI: 1/15/2023     Consult     Ref: ED     I reviewed the note from Dr. Aggarwal from the emergency department visit on 1/15/2023 in detail.    History of Present Illness:   Mechanism of Injury: Taking garbage out of the garage and slipped and fell on outstretched hand.  Location: Left distal radius  Duration of Pain: Since date of injury which was 1/15/2023  Rating of Pain: 7 out of 10  Pain Quality: Achy  Pain is better with: Rest and splint  Pain is worse with: Bumping wrist  Treatment so far consists of: Patient was seen on 1/15/2023 in the emergency department.  X-ray was done at that time and fracture was noted with no displacement.  Sugar-tong splint was put on and referral to orthopedics was done.  Patient did get some oxycodone for pain, 12 tablets.  Associated Features: Denies numbness or tingling shooting burning or electric pain.  Prior history of related problems: No previous major injury or surgery to the left wrist or fracture.  Pain is Limiting: Use of left upper extremity  Here to: Orthopedic consultation  The Pain Has: Been about the same  Additional History: None      Patient's past medical, surgical, social and family histories reviewed.     Past Medical History:   Diagnosis Date     Abnormal Pap smear of cervix 08/22/2007    See problem list     Adenomatous polyp of colon 7/2014     q 5 yr colonoscopys     Cervical high risk HPV (human papillomavirus) test positive 8/22/07, 9/14/18    See problem list     HSV-1 infection     cold sores on lip        Past Surgical History:   Procedure Laterality Date     COLONOSCOPY  7/11/2014    Procedure: COMBINED COLONOSCOPY, SINGLE BIOPSY/POLYPECTOMY BY BIOPSY;  Surgeon: Mendoza Clifford MD;  Location:  GI     DILATION AND CURETTAGE, HYSTEROSCOPY, ABLATE ENDOMETRIUM NOVASURE, COMBINED  6/26/2012    Procedure: COMBINED DILATION AND CURETTAGE, HYSTEROSCOPY, ABLATE ENDOMETRIUM NOVASURE;  Hysteroscopy Dilation and Curettage, Novasure;  Surgeon: Bola Calhoun MD;  Location:  OR     NO HISTORY OF SURGERY         Medications:  calcium carbonate 600 mg-vitamin D 400 units (CALTRATE) 600-400 MG-UNIT per tablet, Take 1 tablet by mouth 2 times daily  ibuprofen (ADVIL/MOTRIN) 800 MG tablet, TAKE 1 TABLET BY MOUTH EVERY 8 HOURS AS NEEDED FOR MODERATE PAIN  NONFORMULARY, Vitamin D  valACYclovir (VALTREX) 500 MG tablet, Take 1 tablet by mouth once daily  oxyCODONE (ROXICODONE) 5 MG tablet, Take 1 tablet (5 mg) by mouth every 6 hours as needed for severe pain (7-10) (Patient not taking: Reported on 1/24/2023)    No current facility-administered medications on file prior to visit.      No Known Allergies    Social History     Occupational History     Not on file   Tobacco Use     Smoking status: Former     Packs/day: 0.50     Years: 20.00     Pack years: 10.00     Types: Cigarettes     Quit date: 12/27/2012     Years since quitting: 10.0     Smokeless tobacco: Never   Substance and Sexual Activity     Alcohol use: Yes     Alcohol/week: 10.0 standard drinks     Comment: glass of wine daily and few on weekends     Drug use: No     Sexual activity: Yes     Partners: Male     Birth control/protection: Surgical       Family History   Problem Relation Age of Onset     Family History Negative No family hx of      No pertinent family history     REVIEW OF  SYSTEMS  10 point review systems performed otherwise negative as noted as per history of present illness.    Physical Exam:  Vitals: /80   BMI= There is no height or weight on file to calculate BMI.    Constitutional: healthy, alert and no acute distress   Psychiatric: mentation appears normal and affect normal/bright  NEURO: no focal deficits, CMS intact left upper extremity   RESP: Normal with easy respirations and no use of accessory muscles to breathe, no audible wheezing or retractions  CV: +2 radial pulse and her hand is warm to palpation.   SKIN: No erythema, rashes, excoriation, or breakdown. No evidence of infection.  No cast abrasions/splint abrasions  MUSCULOSKELETAL:    INSPECTION of left wrist: No gross deformities, erythema, edema, ecchymosis, atrophy or fasciculations.     PALPATION: Tenderness to palpation of the distal radius.  No tenderness to palpation over the distal ulna, no tenderness to palpation of the hand or digits or forearm.  No increased warmth.     ROM: Able to move all 5 digits without catching locking or pain.  I did not have the patient do wrist range of motion today.     STRENGTH: +4 out of 5 , interosseous and thumb without pain.     SPECIAL TEST: DRUJ stable  GAIT: non-antalgic  Lymph: no palpable lymph nodes    Diagnostic Modalities:  Recent Results (from the past 744 hour(s))   XR Wrist Left G/E 3 Views    Narrative    EXAM: XR WRIST LEFT G/E 3 VIEWS  LOCATION: [facility]  DATE/TIME: 1/15/2023 7:50 PM    INDICATION: fall wrist pain  COMPARISON: None.      Impression    IMPRESSION: Acute mildly displaced fracture of the distal left radius without angulation of the fracture fragments. No additional fractures. No degenerative changes.     I agree with the above reading.    X-rays done today showing distal radius fracture with no change in alignment from previous x-rays done on 1/15/2023.  I do feel that on the AP view in the central portion  of the fracture there is some mineralization that has started.  No other fracture dislocation or tumor.  Volar tilt is neutral as it was on the previous x-rays also.    X-rays done after casting and mold applied.  Mold looks appropriate in the appropriate area.  Volar tilt appears to be about neutral still.  No other fracture dislocation or tumor.    Independent visualization of the images was performed.    Impression: 1.  9 days status post left distal radius fracture with displacement dorsally and comminution    Plan:  All of the above pertinent physical exam and imaging modalities findings was reviewed with Lorraine.    Cast/splint application    Date/Time: 1/24/2023 4:35 PM  Performed by: Christopher Benjamin PA-C  Authorized by: Christopher Benjamin PA-C     Consent:     Consent obtained:  Verbal    Consent given by:  Patient    Risks discussed:  Discoloration, numbness, pain and swelling    Alternatives discussed:  No treatment, delayed treatment, alternative treatment, observation and referral  Pre-procedure details:     Sensation:  Normal  Procedure details:     Laterality:  Left    Location:  Wrist    Wrist:  L wrist    Cast type:  Short arm    Supplies:  Fiberglass  Post-procedure details:     Pain:  Improved    Sensation:  Normal    Patient tolerance of procedure:  Tolerated well, no immediate complications    Patient provided with cast or splint care instructions: Yes    Comments:      CAST APPLICATION:  On today's visit a well padded Fiberglass short arm cast was applied. I use stockinette and cast padding prior to applying the fiberglass. I applied a mold for comfort and good fit, I also put on a three-point mold to help the distal radius from displacing any more dorsally. Christopher Benjamin PA-C        FOCUSED PLAN:  57-year-old female 9 days out from left distal radius fracture with comminution and dorsal displacement.  Reviewed x-rays showing the distal radius with volar tilt at neutral and unchanged from previous  x-rays.  We decided to cast a short arm cast and three-point mold.  Patient tolerated this well today.  We will plan on immobilization for another 4 weeks for a total of almost 6 weeks.  Follow-up in 4 weeks and we will get the cast off in order hand therapy and hopefully at that point she will not need any immobilization but if she is still having some pain we could do a removable splint.  Follow-up in 4 weeks.    Re-x-ray on return: Yes AP lateral and oblique view of the left wrist outside of the cast.      This note was dictated with [employer].    Christopher Benjamin PA-C        Again, thank you for allowing me to participate in the care of your patient.        Sincerely,        Christopher Benjamin PA-C

## 2023-01-25 ENCOUNTER — TELEPHONE (OUTPATIENT)
Dept: ORTHOPEDICS | Facility: OTHER | Age: 58
End: 2023-01-25
Payer: COMMERCIAL

## 2023-01-25 NOTE — TELEPHONE ENCOUNTER
Reason for Call:  Other appointment    Detailed comments: Left a message for patient to call back and schedule a follow up with Rodolfo Benjamin in one to two weeks per Rodolfo Benjamin    Phone Number Patient can be reached at: Home number on file [phone] (home)    Best Time: any    Can we leave a detailed message on this number? YES    Call taken on 1/25/2023 at 3:21 PM by Ashly Ramesh

## 2023-01-26 DIAGNOSIS — B00.9 RECURRENT HERPES SIMPLEX: ICD-10-CM

## 2023-01-26 RX ORDER — VALACYCLOVIR HYDROCHLORIDE 500 MG/1
TABLET, FILM COATED ORAL
Qty: 30 TABLET | Refills: 3 | Status: SHIPPED | OUTPATIENT
Start: 2023-01-26 | End: 2023-06-16

## 2023-01-26 NOTE — TELEPHONE ENCOUNTER
Prescription approved per [facility] Refill Protocol.  Odalys Lynn RN on 1/26/2023 at 5:24 PM

## 2023-01-31 ENCOUNTER — ANCILLARY PROCEDURE (OUTPATIENT)
Dept: GENERAL RADIOLOGY | Facility: CLINIC | Age: 58
End: 2023-01-31
Attending: PHYSICIAN ASSISTANT
Payer: COMMERCIAL

## 2023-01-31 ENCOUNTER — OFFICE VISIT (OUTPATIENT)
Dept: ORTHOPEDICS | Facility: CLINIC | Age: 58
End: 2023-01-31
Payer: COMMERCIAL

## 2023-01-31 VITALS — DIASTOLIC BLOOD PRESSURE: 76 MMHG | SYSTOLIC BLOOD PRESSURE: 120 MMHG

## 2023-01-31 DIAGNOSIS — S52.532D CLOSED COLLES' FRACTURE OF LEFT RADIUS WITH ROUTINE HEALING, SUBSEQUENT ENCOUNTER: Primary | ICD-10-CM

## 2023-01-31 DIAGNOSIS — S52.532A CLOSED COLLES' FRACTURE OF LEFT RADIUS, INITIAL ENCOUNTER: ICD-10-CM

## 2023-01-31 PROCEDURE — 73110 X-RAY EXAM OF WRIST: CPT | Mod: TC | Performed by: RADIOLOGY

## 2023-01-31 PROCEDURE — 99207 PR FRACTURE CARE IN GLOBAL PERIOD: CPT | Performed by: PHYSICIAN ASSISTANT

## 2023-01-31 ASSESSMENT — PAIN SCALES - GENERAL: PAINLEVEL: NO PAIN (0)

## 2023-01-31 NOTE — LETTER
1/31/2023         RE: Lorraine Hunter  [address] [place] [id number]        Dear Colleague,    Thank you for referring your patient, Lorraien Hunter, to the [facility]. Please see a copy of my visit note below.    Office Visit-Fracture Follow up    Chief Complaint: Lorraine Hunter is a 57 year old female who is being seen for   Chief Complaint   Patient presents with     RECHECK     distal radius fracture with displacement dorsally and comminution-DOI: 1/15/2023       History of Present Illness:   Mechanism of Injury:  Taking garbage out of the garage and slipped and fell on outstretched hand.  Location: Left distal radius  Duration of Pain: Since date of injury which was 1/15/2023  Rating of Pain: 0 out of 10 when quiet and mild when not  Pain Quality: Achy if supination or pronation  Pain is better with: Rest and cast  Pain is worse with: Supination pronation  Treatment so far consists of: Patient was last seen on 1/24/2023 and at that point I placed her in a short arm cast with a three-point mold to prevent any more dorsal angulation.  I did run the case by Dr. Anglin who felt that follow-up in 1 to 2 weeks to make sure that the dorsal angulation has not increased would be best if it had not increased could continue to treat nonoperatively.  Patient admits to using her arm quite a bit and having some pain with supination and pronation.  Associated Features: Denies numbness or tingling shooting burning or electric pain other than very slight intermittent finger tingling.  Pain is Limiting: Heavy use of left upper extremity  Here to: Orthopedic follow-up and x-ray  Additional History: Patient admits to using her arm a little bit too much.  I did warn her to not do that.    REVIEW OF SYSTEMS  General: negative for, night sweats, dizziness, fatigue  Resp: No shortness of breath and no cough  CV: negative for chest pain, syncope or near-syncope  GI: negative for nausea, vomiting  and diarrhea  : negative for dysuria and hematuria  Musculoskeletal: as above  Neurologic: negative for syncope   Hematologic: negative for bleeding disorder    Physical Exam:  Vitals: /76   BMI= There is no height or weight on file to calculate BMI.  Constitutional: healthy, alert and no acute distress   Psychiatric: mentation appears normal and affect normal/bright  NEURO: no focal deficits, CMS intact left upper extremity   RESP: Normal with easy respirations and no use of accessory muscles to breathe, no audible wheezing or retractions  CV: Fingers are warm to palpation.  SKIN: No erythema, rashes, excoriation, or breakdown. No evidence of infection of the skin I can see today.  Short arm cast on.  MUSCULOSKELETAL:    INSPECTION of left wrist: Short arm cast intact.  No cast abrasions that I can see.  No gross deformities, erythema, edema, ecchymosis, atrophy or fasciculations.     PALPATION: No tenderness of palpation of the digits and the upper forearm.  No increased warmth.    ROM: Moving all 5 digits without any catching locking or pain.  Full range of motion of elbow without any catching locking or pain.     STRENGTH: 5 out of 5 , interosseous and thumb without pain.     SPECIAL TEST: None  GAIT: non-antalgic  Lymph: no palpable lymph nodes      Diagnostic Modalities:  Recent Results (from the past 744 hour(s))   XR Wrist Left G/E 3 Views    Narrative    EXAM: XR WRIST LEFT G/E 3 VIEWS  LOCATION: [facility]  DATE/TIME: 1/15/2023 7:50 PM    INDICATION: fall wrist pain  COMPARISON: None.      Impression    IMPRESSION: Acute mildly displaced fracture of the distal left radius without angulation of the fracture fragments. No additional fractures. No degenerative changes.   XR Wrist Left G/E 3 Views    Narrative    WRIST LEFT THREE OR MORE VIEWS January 24, 2023 3:48 PM     HISTORY: Left wrist injury.    COMPARISON: Radiographs from 1/15/2023.      Impression     IMPRESSION: There is a little more impaction, particularly dorsally,  when compared to the prior study. This leads to some new mild dorsal  tilt of the distal radial articular surface. The distal ulna remains  normal in appearance.    OSEI WASHINGTON MD         SYSTEM ID:  [id number]   XR Wrist Left G/E 3 Views    Narrative    EXAM: XR WRIST LEFT G/E 3 VIEWS  LOCATION: [facility]  DATE/TIME: 1/24/2023 4:43 PM    INDICATION:  Closed Colles' fracture of left radius, initial encounter. Wrist pain.  COMPARISON: 01/24/2023 at 1556 hours.      Impression    IMPRESSION: Comminuted mildly displaced intra-articular fracture of the distal left radius with mild dorsal angulation of the distal radial fracture fragments. No additional fractures. Slight widening of the scapholunate interval.     I agree with the above readings.    X-rays done today showing no change in angulation of distal radius dorsally.  Measuring approximately 15 to 20 degrees.  Cast material noted.  No other fracture dislocation or tumor.  X-rays compared to previous x-rays done on 1/24/2023.    Independent visualization of the images was performed.      Impression: 1.    16 days status post left distal radius fracture with displacement dorsally and comminution    Plan:  All of the above pertinent physical exam and imaging modalities findings was reviewed with Lorraine.    FOCUSED PLAN:   57-year-old female with left distal radius fracture with displacement dorsally and comminution.  We had the patient come back early to make sure that the distal radial fragment had not displaced more dorsally.  It has not in the cast is doing its job.  This gives us more prominence to follow-up with patient in 3 weeks.  At that time she will have had 5 weeks of immobilization.  We will remove the cast and get x-rays and hopefully start hand therapy and hopefully she will not need further immobilization at that time.  We did discuss if the  distal fragment were to displace more she could potentially need surgery however I do not anticipate that now.  I will review this case with Dr. Anglin also.  Follow-up in 3 weeks.    Re-x-ray on return: Yes we will get AP lateral oblique view of the left wrist outside of the cast.      This note was dictated with [employer].    Christopher Benjamin PA-C        Again, thank you for allowing me to participate in the care of your patient.        Sincerely,        Christopher Benjamin PA-C

## 2023-01-31 NOTE — PROGRESS NOTES
Office Visit-Fracture Follow up    Chief Complaint: Lorraine Hunter is a 57 year old female who is being seen for   Chief Complaint   Patient presents with     RECHECK     distal radius fracture with displacement dorsally and comminution-DOI: 1/15/2023       History of Present Illness:   Mechanism of Injury:  Taking garbage out of the garage and slipped and fell on outstretched hand.  Location: Left distal radius  Duration of Pain: Since date of injury which was 1/15/2023  Rating of Pain: 0 out of 10 when quiet and mild when not  Pain Quality: Achy if supination or pronation  Pain is better with: Rest and cast  Pain is worse with: Supination pronation  Treatment so far consists of: Patient was last seen on 1/24/2023 and at that point I placed her in a short arm cast with a three-point mold to prevent any more dorsal angulation.  I did run the case by Dr. Anglin who felt that follow-up in 1 to 2 weeks to make sure that the dorsal angulation has not increased would be best if it had not increased could continue to treat nonoperatively.  Patient admits to using her arm quite a bit and having some pain with supination and pronation.  Associated Features: Denies numbness or tingling shooting burning or electric pain other than very slight intermittent finger tingling.  Pain is Limiting: Heavy use of left upper extremity  Here to: Orthopedic follow-up and x-ray  Additional History: Patient admits to using her arm a little bit too much.  I did warn her to not do that.    REVIEW OF SYSTEMS  General: negative for, night sweats, dizziness, fatigue  Resp: No shortness of breath and no cough  CV: negative for chest pain, syncope or near-syncope  GI: negative for nausea, vomiting and diarrhea  : negative for dysuria and hematuria  Musculoskeletal: as above  Neurologic: negative for syncope   Hematologic: negative for bleeding disorder    Physical Exam:  Vitals: /76   BMI= There is no height or weight on file to  calculate BMI.  Constitutional: healthy, alert and no acute distress   Psychiatric: mentation appears normal and affect normal/bright  NEURO: no focal deficits, CMS intact left upper extremity   RESP: Normal with easy respirations and no use of accessory muscles to breathe, no audible wheezing or retractions  CV: Fingers are warm to palpation.  SKIN: No erythema, rashes, excoriation, or breakdown. No evidence of infection of the skin I can see today.  Short arm cast on.  MUSCULOSKELETAL:    INSPECTION of left wrist: Short arm cast intact.  No cast abrasions that I can see.  No gross deformities, erythema, edema, ecchymosis, atrophy or fasciculations.     PALPATION: No tenderness of palpation of the digits and the upper forearm.  No increased warmth.    ROM: Moving all 5 digits without any catching locking or pain.  Full range of motion of elbow without any catching locking or pain.     STRENGTH: 5 out of 5 , interosseous and thumb without pain.     SPECIAL TEST: None  GAIT: non-antalgic  Lymph: no palpable lymph nodes      Diagnostic Modalities:  Recent Results (from the past 744 hour(s))   XR Wrist Left G/E 3 Views    Narrative    EXAM: XR WRIST LEFT G/E 3 VIEWS  LOCATION: [facility]  DATE/TIME: 1/15/2023 7:50 PM    INDICATION: fall wrist pain  COMPARISON: None.      Impression    IMPRESSION: Acute mildly displaced fracture of the distal left radius without angulation of the fracture fragments. No additional fractures. No degenerative changes.   XR Wrist Left G/E 3 Views    Narrative    WRIST LEFT THREE OR MORE VIEWS January 24, 2023 3:48 PM     HISTORY: Left wrist injury.    COMPARISON: Radiographs from 1/15/2023.      Impression    IMPRESSION: There is a little more impaction, particularly dorsally,  when compared to the prior study. This leads to some new mild dorsal  tilt of the distal radial articular surface. The distal ulna remains  normal in appearance.    OSEI GARRETT  MD FELIX         SYSTEM ID:  [id number]   XR Wrist Left G/E 3 Views    Narrative    EXAM: XR WRIST LEFT G/E 3 VIEWS  LOCATION: [facility]  DATE/TIME: 1/24/2023 4:43 PM    INDICATION:  Closed Colles' fracture of left radius, initial encounter. Wrist pain.  COMPARISON: 01/24/2023 at 1556 hours.      Impression    IMPRESSION: Comminuted mildly displaced intra-articular fracture of the distal left radius with mild dorsal angulation of the distal radial fracture fragments. No additional fractures. Slight widening of the scapholunate interval.     I agree with the above readings.    X-rays done today showing no change in angulation of distal radius dorsally.  Measuring approximately 15 to 20 degrees.  Cast material noted.  No other fracture dislocation or tumor.  X-rays compared to previous x-rays done on 1/24/2023.    Independent visualization of the images was performed.      Impression: 1.    16 days status post left distal radius fracture with displacement dorsally and comminution    Plan:  All of the above pertinent physical exam and imaging modalities findings was reviewed with Lorraine.    FOCUSED PLAN:   57-year-old female with left distal radius fracture with displacement dorsally and comminution.  We had the patient come back early to make sure that the distal radial fragment had not displaced more dorsally.  It has not in the cast is doing its job.  This gives us more prominence to follow-up with patient in 3 weeks.  At that time she will have had 5 weeks of immobilization.  We will remove the cast and get x-rays and hopefully start hand therapy and hopefully she will not need further immobilization at that time.  We did discuss if the distal fragment were to displace more she could potentially need surgery however I do not anticipate that now.  I will review this case with Dr. Anglin also.  Follow-up in 3 weeks.    Re-x-ray on return: Yes we will get AP lateral oblique view of  the left wrist outside of the cast.      This note was dictated with [email or web address].    Christopher Benjamin PA-C

## 2023-02-02 ENCOUNTER — TELEPHONE (OUTPATIENT)
Dept: ORTHOPEDICS | Facility: CLINIC | Age: 58
End: 2023-02-02

## 2023-02-02 ENCOUNTER — OFFICE VISIT (OUTPATIENT)
Dept: ORTHOPEDICS | Facility: CLINIC | Age: 58
End: 2023-02-02
Payer: COMMERCIAL

## 2023-02-02 DIAGNOSIS — S52.509A DISTAL RADIUS FRACTURE: Primary | ICD-10-CM

## 2023-02-02 PROCEDURE — 99207 PR FRACTURE CARE IN GLOBAL PERIOD: CPT | Performed by: ORTHOPAEDIC SURGERY

## 2023-02-02 NOTE — LETTER
2/2/2023         RE: Lorraine Hunter  [address] [place] [id number]        Dear Colleague,    Thank you for referring your patient, Lorraine Hunter, to the [facility]. Please see a copy of my visit note below.    Patient presented following acute onset of finger numbness following casting. Cast applied on 1/24/23 by Estuardo Benjaimn PA-C, slight forward flexion and 3 point colles mold applied to the cast. No symptoms following application of cast. Numbness began on Tuesday 1/31/23 and is intermittent, patient does have sensation and motion of the fingers. Patient has a history of bilateral carpal tunnel syndrome.    Discussed patient concerns with Dr. Palomares who explained that the numbness could be a result of continued swelling in the wrist. This is typically uncommon at this time of     After careful consideration, patient decided against a cast change and that she will elevate the wrist at a higher level and follow up with Estuardo Benjamin PA-C sooner if needed. Repeat XR not needed today as no changes were made. This plan was approved by .     Patient was seen today by ATC only, patient was not seen by provider.     Roula Barrios MS, ATC  Certified Athletic Trainer         Again, thank you for allowing me to participate in the care of your patient.        Sincerely,        Toño Palomares MD

## 2023-02-02 NOTE — TELEPHONE ENCOUNTER
[facility]    Phone Message    May a detailed message be left on voicemail: no     Reason for Call: Other: Requesting c/b- fingers had been tingling now they are number for ~2 days. Has been elevating. Wondering if she needs an MRI.

## 2023-02-02 NOTE — PROGRESS NOTES
Patient presented following acute onset of finger numbness following casting. Cast applied on 1/24/23 by Estuardo Benjamin PA-C, slight forward flexion and 3 point colles mold applied to the cast. No symptoms following application of cast. Numbness began on Tuesday 1/31/23 and is intermittent, patient does have sensation and motion of the fingers. Patient has a history of bilateral carpal tunnel syndrome.    Discussed patient concerns with Dr. Palomares who explained that the numbness could be a result of continued swelling in the wrist. This is typically uncommon at this time of     After careful consideration, patient decided against a cast change and that she will elevate the wrist at a higher level and follow up with Estuardo Benjamin PA-C sooner if needed. Repeat XR not needed today as no changes were made. This plan was approved by .     Patient was seen today by ATC only, patient was not seen by provider.     Roula Barrios MS, ATC  Certified Athletic Trainer

## 2023-02-02 NOTE — TELEPHONE ENCOUNTER
Discussed patient concerns with VANIA Flores, he states that patient may be overusing arm. He states that if she feels comfortable with resting her arm in order to see if numbness goes away she can do so, but if she would like to be seen in person to have cast evaluated that would be fine as well. VANIA Flores also advised that if re-casting is needed it would need to be a 3 point mold and x-rays should be obtained before and after casting, ideally, but after casting at a minimum. Discussed with Roula Barrios ATC who advised that patient could be added to Dr. Palomares's schedule in [place] this afternoon.     Called and spoke with patient who states that she has been resting her arm more frequently than she had previously because it has been more painful. She states that she would like to be seen in person today. Patient was added to Dr. Palomares's schedule this afternoon.     Macey Downey RN   [facility]

## 2023-02-02 NOTE — TELEPHONE ENCOUNTER
Called and spoke with patient, she states that all of her fingers had been lightly tingling and have now gone completely numb. She had a cast placed 1/24/23. She can move all of her fingers, she states they are a normal temperature, cap refill was checked by patient on the phone and it is normal. Please advise whether cast should be evaluated or re-adjusted at this time.     Macey Downey RN   [facility]

## 2023-02-21 ENCOUNTER — TELEPHONE (OUTPATIENT)
Dept: FAMILY MEDICINE | Facility: CLINIC | Age: 58
End: 2023-02-21
Payer: COMMERCIAL

## 2023-02-21 NOTE — LETTER
"[address] [address]  [phone]        February 21, 2023    Lorraine Hunter  [address] [place] [id number]          Dear Lorraine,    This is a reminder that it is time to make your appointment for your annual breast imaging. Our records indicate your last breast imaging was performed on 3/10/21. You may make an appointment for your breast imaging by calling our scheduling line [phone] or [phone] or by visiting [employer], clicking on the \"Visits\" tab and selecting \"Schedule An Appointment.    If you are experiencing breast symptoms or concerns such as a new lump or breast pain you should first contact your health care team before scheduling your breast imaging.  Your healthcare professional may want to see you prior to your visit.    As you know, early detection of cancer is very important. Currently the American College of Radiology and the Society of Breast Imaging recommend annual breast imaging beginning at the age of 40.    If you are a patient currently enrolled in the [place] [facility] or the [facility], you must be seen by your health care team for a clinical breast examination prior to your breast imaging.    If you have already made an appointment, please disregard this letter.    Thank you and we look forward to seeing you in the near future for your annual breast imaging.    Sincerely,    Your Care Team  [facility]               "

## 2023-03-15 ENCOUNTER — HOSPITAL ENCOUNTER (OUTPATIENT)
Dept: OCCUPATIONAL THERAPY | Facility: CLINIC | Age: 58
Setting detail: THERAPIES SERIES
Discharge: HOME | End: 2023-03-15
Attending: ORTHOPAEDIC SURGERY
Payer: COMMERCIAL

## 2023-03-15 PROCEDURE — 97140 MANUAL THERAPY 1/> REGIONS: CPT | Mod: GO | Performed by: OCCUPATIONAL THERAPIST

## 2023-03-15 PROCEDURE — 97165 OT EVAL LOW COMPLEX 30 MIN: CPT | Mod: GO | Performed by: OCCUPATIONAL THERAPIST

## 2023-03-15 PROCEDURE — 97110 THERAPEUTIC EXERCISES: CPT | Mod: GO | Performed by: OCCUPATIONAL THERAPIST

## 2023-03-15 NOTE — PROGRESS NOTES
Initial Evaluation:     UEFI:  10/80   03/15/23 1000   General Information/History   Start Of Care Date 03/15/23   Referring Physician Latrell Flowers MD ([facility])   Orders Evaluate And Treat As Indicated   Orders Date 03/13/23   Medical Diagnosis L CLosed DRF   Additional Occupational Profile Info/Pertinent history of current problem Fall on Jan 15th. and went to ER w/ splint placed and went [facility]. Casted for 8 wks. Pt plans for Vacation next week.   Previous treatment or current condition Casted   How/Where did it occur With a fall   Onset date of current episode/exacerbation 01/15/23   Hand Dominance Right   Affected side Left   Functional limitations perform activities of daily living;perform required work activities;perform desired leisure / sports activities   Reported Symptoms Pain;Loss of Motion/Stiffness;Loss of strength;Tingling;Edema   Prior level of function Independent ADL   Important Activities outside in sun, TV,   Living environment House/townhome   Patient role/Employment history Employed   Occupation [employer], [email or web address]   Employment Status Working in normal job without restrictions   Primary Job Tasks Keyboarding;Lifting;Using a mouse   Occupation Comments Mostly keyboard and [employer] - not carrying items.   Patient/Family goals statement Reduced pain, increase motion, return I living.   General observations In brace.   Fall Risk Screen   Fall screen completed by OT   Have you fallen 2 or more times in the past year? Yes   Have you fallen and had an injury in the past year? Yes   Is patient a fall risk? Yes   Abuse Screen (yes response referral indicated)   Feels Unsafe at Home or Work/School no   Feels Threatened by Someone no   Does Anyone Try to Keep You From Having Contact with Others or Doing Things Outside Your Home? no   Physical Signs of Abuse Present no   Pain   Pain Primary Pain Report   Primary Pain Report   Location L Wrist   Radiation Wrist   Pain Quality  Sharp   Frequency Intermittent   Scale 0/10  (at rest. w/ 10/10 twist)   Pain Is Same All Of The Time  (all activity based)   Pain Is Exacerbated By Twisting , Pulling;Activity/movement   Pain Is Relieved By Rest   Progression Since Onset Gradually Improving   Edema   Overall  - Left Mild  (16, volar bouncy ball size)   Overall  - Right None   ROM   ROM AROM   AROM   AROM Wrist   Wrist   Wrist Extension - Left 40   Wrist Extension - Right 75   Wrist Flexion- Left 25   Wrist Flexion - Right 70   Wrist Supination- Left 70   Wrist Supination - Right 90   Wrist Pronation- Left 25   Wrist Pronation - Right 90   Radial Deviation- Left 5   Radial Deviation - Right 20   Ulnar Deviation- Left 15   Ulnar Deviation - Right 30   Sensation Findings   Sensation Findings Sensation   Sensation   - Left WNL Per Patient   Sensation - Right WNL Per Patient   Education Assessment   Preferred Learning Style Listening;Demonstration;Pictures/video   Barriers to Learning No barriers   Therapy Interventions   Planned Therapy Interventions Strengthening;ROM;Coordination;Stretching;Manual Therapy;Education of splint wear, care, fit and precautions;Edema Management;Self Care/Home Management   Clinical Impression   Criteria for Skilled Therapeutic Interventions Met yes;treatment indicated   OT Diagnosis Wrist pain. Wrist stiffness. Edema. Weakness   Influenced by the following impairments Pain;Edema;Decreased range of motion;Decreased strength   Assessment of Occupational Performance 1-3 Performance Deficits   Identified Performance Deficits ADL, IADL, Work   Clinical Decision Making (Complexity) Low complexity   Therapy Frequency 1-2x/wk   Predicted Duration of Therapy Intervention (days/wks) 8wks   Risks and Benefits of Treatment have been explained. Yes   Patient, Family & other staff in agreement with plan of care Yes   Clinical Impression Comments Pt presents this date s/p closed DRF w/ 8wks non-op management in cast that was remvoed  3.13.23. She is with reports of significant pain and limited motion as well as edema. Upon presentation, motion is stiff but able to go all directions, except Pronation is very difficult and limited. Edema i smoderate throughout the wrist and overall pt is with anticipatory fear and hesitancy with all motion in regards to pain. She was able to make some nice progress in treatment w/ motion and pain control. Education on brace wear and encouraged to begin light ADLs. Applied ktape to support the wrist and provide sensory input to the dorsal wrist.   Hand Eval Goals   Hand Eval Goals 1;2   Hand Goal 1   Goal Identifier Pronation   Goal Description Pt will have 70deg of pronation.   Target Date 05/04/23   Hand Goal 2   Goal Identifier ADL   Goal Description Pt will be able to engage in ADLs with mild difficulty.   Target Date 05/04/23   Total Evaluation Time   OT Eval, Low Complexity Minutes (59183) 30     Kavya Hamlin, WALTR/L, CHT

## 2023-03-17 ENCOUNTER — TRANSCRIBE ORDERS (OUTPATIENT)
Dept: OTHER | Age: 58
End: 2023-03-17

## 2023-03-17 DIAGNOSIS — S52.509A DISTAL RADIUS FRACTURE: ICD-10-CM

## 2023-03-17 DIAGNOSIS — S62.102A LEFT WRIST FRACTURE: Primary | ICD-10-CM

## 2023-03-20 ENCOUNTER — HOSPITAL ENCOUNTER (OUTPATIENT)
Dept: OCCUPATIONAL THERAPY | Facility: CLINIC | Age: 58
Setting detail: THERAPIES SERIES
Discharge: HOME | End: 2023-03-20
Attending: ORTHOPAEDIC SURGERY
Payer: COMMERCIAL

## 2023-03-20 PROCEDURE — 97140 MANUAL THERAPY 1/> REGIONS: CPT | Mod: GO | Performed by: OCCUPATIONAL THERAPIST

## 2023-03-20 PROCEDURE — 97110 THERAPEUTIC EXERCISES: CPT | Mod: GO | Performed by: OCCUPATIONAL THERAPIST

## 2023-03-22 ENCOUNTER — HOSPITAL ENCOUNTER (OUTPATIENT)
Dept: OCCUPATIONAL THERAPY | Facility: CLINIC | Age: 58
Setting detail: THERAPIES SERIES
Discharge: HOME | End: 2023-03-22
Attending: ORTHOPAEDIC SURGERY
Payer: COMMERCIAL

## 2023-03-22 PROCEDURE — 97140 MANUAL THERAPY 1/> REGIONS: CPT | Mod: GO | Performed by: OCCUPATIONAL THERAPIST

## 2023-03-22 PROCEDURE — 97110 THERAPEUTIC EXERCISES: CPT | Mod: GO | Performed by: OCCUPATIONAL THERAPIST

## 2023-03-22 PROCEDURE — 97112 NEUROMUSCULAR REEDUCATION: CPT | Mod: GO | Performed by: OCCUPATIONAL THERAPIST

## 2023-04-05 ENCOUNTER — HOSPITAL ENCOUNTER (OUTPATIENT)
Dept: OCCUPATIONAL THERAPY | Facility: CLINIC | Age: 58
Setting detail: THERAPIES SERIES
Discharge: HOME | End: 2023-04-05
Payer: COMMERCIAL

## 2023-04-05 PROCEDURE — 97140 MANUAL THERAPY 1/> REGIONS: CPT | Mod: GO | Performed by: OCCUPATIONAL THERAPIST

## 2023-04-05 PROCEDURE — 97110 THERAPEUTIC EXERCISES: CPT | Mod: GO | Performed by: OCCUPATIONAL THERAPIST

## 2023-04-07 ENCOUNTER — HOSPITAL ENCOUNTER (OUTPATIENT)
Dept: OCCUPATIONAL THERAPY | Facility: CLINIC | Age: 58
Setting detail: THERAPIES SERIES
Discharge: HOME | End: 2023-04-07
Payer: COMMERCIAL

## 2023-04-07 PROCEDURE — 97140 MANUAL THERAPY 1/> REGIONS: CPT | Mod: GO | Performed by: OCCUPATIONAL THERAPIST

## 2023-04-07 PROCEDURE — 97110 THERAPEUTIC EXERCISES: CPT | Mod: GO | Performed by: OCCUPATIONAL THERAPIST

## 2023-04-12 ENCOUNTER — HOSPITAL ENCOUNTER (OUTPATIENT)
Dept: OCCUPATIONAL THERAPY | Facility: CLINIC | Age: 58
Setting detail: THERAPIES SERIES
Discharge: HOME | End: 2023-04-12
Payer: COMMERCIAL

## 2023-04-12 PROCEDURE — 97140 MANUAL THERAPY 1/> REGIONS: CPT | Mod: GO | Performed by: OCCUPATIONAL THERAPIST

## 2023-04-12 PROCEDURE — 97110 THERAPEUTIC EXERCISES: CPT | Mod: GO | Performed by: OCCUPATIONAL THERAPIST

## 2023-04-16 ENCOUNTER — HEALTH MAINTENANCE LETTER (OUTPATIENT)
Age: 58
End: 2023-04-16

## 2023-04-19 ENCOUNTER — HOSPITAL ENCOUNTER (OUTPATIENT)
Dept: OCCUPATIONAL THERAPY | Facility: CLINIC | Age: 58
Setting detail: THERAPIES SERIES
Discharge: HOME | End: 2023-04-19
Payer: COMMERCIAL

## 2023-04-19 PROCEDURE — 97140 MANUAL THERAPY 1/> REGIONS: CPT | Mod: GO | Performed by: OCCUPATIONAL THERAPIST

## 2023-04-19 PROCEDURE — 97110 THERAPEUTIC EXERCISES: CPT | Mod: GO | Performed by: OCCUPATIONAL THERAPIST

## 2023-04-19 PROCEDURE — 97112 NEUROMUSCULAR REEDUCATION: CPT | Mod: GO | Performed by: OCCUPATIONAL THERAPIST

## 2023-04-21 ENCOUNTER — HOSPITAL ENCOUNTER (OUTPATIENT)
Dept: OCCUPATIONAL THERAPY | Facility: CLINIC | Age: 58
Setting detail: THERAPIES SERIES
Discharge: HOME | End: 2023-04-21
Attending: ORTHOPAEDIC SURGERY
Payer: COMMERCIAL

## 2023-04-21 PROCEDURE — 97110 THERAPEUTIC EXERCISES: CPT | Mod: GO | Performed by: OCCUPATIONAL THERAPIST

## 2023-04-21 PROCEDURE — 97140 MANUAL THERAPY 1/> REGIONS: CPT | Mod: GO | Performed by: OCCUPATIONAL THERAPIST

## 2023-04-26 ENCOUNTER — HOSPITAL ENCOUNTER (OUTPATIENT)
Dept: OCCUPATIONAL THERAPY | Facility: CLINIC | Age: 58
Setting detail: THERAPIES SERIES
Discharge: HOME | End: 2023-04-26
Payer: COMMERCIAL

## 2023-04-26 PROCEDURE — 97110 THERAPEUTIC EXERCISES: CPT | Mod: GO | Performed by: OCCUPATIONAL THERAPIST

## 2023-04-26 PROCEDURE — 97140 MANUAL THERAPY 1/> REGIONS: CPT | Mod: GO | Performed by: OCCUPATIONAL THERAPIST

## 2023-04-26 NOTE — PROGRESS NOTES
Progress Note    UEFI 52/80     04/26/23 1600   Signing Clinician's Name / Credentials   Signing clinician's name / credentials Kavya Hamlin, OTR/L, CHT   Session Number   Session Number 9   Additional Session Number 8   Quick Add   Quick Add  Hand   Hand   Referring Physician Latrell Flowers MD ([facility])   Medical Diagnosis L CLosed DRF   Orders Evaluate And Treat As Indicated   Progress/Recertification   Progress Note Due 05/04/23   Adult OT Eval Goals   Hand Eval Goals 1;2   Hand Goal 1   Goal Identifier Pronation   Goal Description Pt will have 70deg of pronation.   Target Date 05/04/23   Hand Goal 2   Goal Identifier ADL   Goal Description Pt will be able to engage in ADLs with mild difficulty.   Target Date 05/04/23   Subjective Report   Subjective Report I really think it's getting better and it's moving more and more.   Initial Pain level 10/10  (spikes)   Current Pain level 0/10  (at rest. 7/10 moving)   Objective Measures   Objective Measures Objective Measure 1   Edema Location Left   Edema Comments 15 DWC  (14.7 R)   ROM Location Left   Motion Ext/Flex;Pronation;Supination   ROM Comments 75/45, 90/50, 15/35  (post: 83/6 0, 80/40, 15/x)   Strength  12 L  (45 R. same)   Objective Measure 1   Objective Measure DPC to fingertipes   Details .5, .5, 1, 1  (nt this session, similar)   Therapeutic Interventions   Therapeutic Interventions Manual Therapy;Therapeutic Procedure/Exercise;Neuromuscular Re-education   Therapeutic Procedure/Exercise   Therapeutic Procedure: strength, endurance, ROM, flexibillity minutes (96919) 30   Skilled Intervention guided motion   Patient Response well   Treatment Detail Wrist AROM: E/F., S/P, RD/UD. Digit AROM & PROM. PROM wrist extension/flexion HEP. PCR and ulnar carpal MWM w/ E/F, RD/UD. Hammer pronation. Flexbar happy/sad/twists. 2# WE w/ DB. Putty , pinch, add, abd. reviewd and complete flexbar. Wrist AA/PROM w/ MWMs in all directions.   Manual Therapy   Manual Therapy  Minutes (16378) 15   Skilled Intervention STM, MWM   Patient Response well   Treatment Detail STM throughout dorsal FA. STM and MEM volar FA. Thenar TPR/STM. JM to Carpals. US 6min, 3.3mhz, 1.2w/c2 100% volar/dorsal wrist   Education   Learner Patient   Readiness Acceptance   Method Booklet/handout;Explanation;Demonstration   Response Demonstrates understanding;Verbalizes understanding   Plan   Homework PTRx   Plan for next session AROM, PROM, strength when ready   Comments   Comments Pt conts to make improvements with AROM, she conts to struggle with strength, Digits now able to get into a fist for first time which will help with strength. Conts to benefit from ongoing skilled therapy tp address ongoing deficits.   Total Session Time   Timed Code Treatment Minutes 45   Total Treatment Time (sum of timed and untimed services) 45   Medicare Claim Information   Start Of Care Date 03/15/23   Onset date of current episode/exacerbation 01/15/23     MARCEL Iniguez/DERRICK, CHT

## 2023-05-03 ENCOUNTER — HOSPITAL ENCOUNTER (OUTPATIENT)
Dept: OCCUPATIONAL THERAPY | Facility: CLINIC | Age: 58
Setting detail: THERAPIES SERIES
Discharge: HOME | End: 2023-05-03
Payer: COMMERCIAL

## 2023-05-03 PROCEDURE — 97112 NEUROMUSCULAR REEDUCATION: CPT | Mod: GO | Performed by: OCCUPATIONAL THERAPIST

## 2023-05-03 PROCEDURE — 97110 THERAPEUTIC EXERCISES: CPT | Mod: GO | Performed by: OCCUPATIONAL THERAPIST

## 2023-05-03 PROCEDURE — 97140 MANUAL THERAPY 1/> REGIONS: CPT | Mod: GO | Performed by: OCCUPATIONAL THERAPIST

## 2023-05-05 ENCOUNTER — HOSPITAL ENCOUNTER (OUTPATIENT)
Dept: OCCUPATIONAL THERAPY | Facility: CLINIC | Age: 58
Setting detail: THERAPIES SERIES
Discharge: HOME | End: 2023-05-05
Payer: COMMERCIAL

## 2023-05-05 PROCEDURE — 97530 THERAPEUTIC ACTIVITIES: CPT | Mod: GO | Performed by: OCCUPATIONAL THERAPIST

## 2023-05-05 PROCEDURE — 97140 MANUAL THERAPY 1/> REGIONS: CPT | Mod: GO | Performed by: OCCUPATIONAL THERAPIST

## 2023-05-05 PROCEDURE — 97110 THERAPEUTIC EXERCISES: CPT | Mod: GO | Performed by: OCCUPATIONAL THERAPIST

## 2023-05-09 ENCOUNTER — HOSPITAL ENCOUNTER (OUTPATIENT)
Dept: OCCUPATIONAL THERAPY | Facility: CLINIC | Age: 58
Setting detail: THERAPIES SERIES
Discharge: HOME | End: 2023-05-09
Attending: ORTHOPAEDIC SURGERY
Payer: COMMERCIAL

## 2023-05-09 PROCEDURE — 97112 NEUROMUSCULAR REEDUCATION: CPT | Mod: GO | Performed by: OCCUPATIONAL THERAPIST

## 2023-05-09 PROCEDURE — 97140 MANUAL THERAPY 1/> REGIONS: CPT | Mod: GO | Performed by: OCCUPATIONAL THERAPIST

## 2023-05-09 PROCEDURE — 97110 THERAPEUTIC EXERCISES: CPT | Mod: GO | Performed by: OCCUPATIONAL THERAPIST

## 2023-05-18 ENCOUNTER — ANCILLARY ORDERS (OUTPATIENT)
Dept: FAMILY MEDICINE | Facility: CLINIC | Age: 58
End: 2023-05-18

## 2023-05-18 DIAGNOSIS — Z12.31 VISIT FOR SCREENING MAMMOGRAM: ICD-10-CM

## 2023-05-22 ENCOUNTER — THERAPY VISIT (OUTPATIENT)
Dept: OCCUPATIONAL THERAPY | Facility: CLINIC | Age: 58
End: 2023-05-22
Attending: ORTHOPAEDIC SURGERY
Payer: COMMERCIAL

## 2023-05-22 DIAGNOSIS — S62.102A WRIST FRACTURE, LEFT, CLOSED, INITIAL ENCOUNTER: ICD-10-CM

## 2023-05-22 PROCEDURE — 97110 THERAPEUTIC EXERCISES: CPT | Mod: GO | Performed by: OCCUPATIONAL THERAPIST

## 2023-05-22 PROCEDURE — 97140 MANUAL THERAPY 1/> REGIONS: CPT | Mod: GO | Performed by: OCCUPATIONAL THERAPIST

## 2023-05-22 PROCEDURE — 97112 NEUROMUSCULAR REEDUCATION: CPT | Mod: GO | Performed by: OCCUPATIONAL THERAPIST

## 2023-05-30 ENCOUNTER — THERAPY VISIT (OUTPATIENT)
Dept: OCCUPATIONAL THERAPY | Facility: CLINIC | Age: 58
End: 2023-05-30
Attending: ORTHOPAEDIC SURGERY
Payer: COMMERCIAL

## 2023-05-30 DIAGNOSIS — S62.102A WRIST FRACTURE, LEFT, CLOSED, INITIAL ENCOUNTER: Primary | ICD-10-CM

## 2023-05-30 PROCEDURE — 97140 MANUAL THERAPY 1/> REGIONS: CPT | Mod: GO | Performed by: OCCUPATIONAL THERAPIST

## 2023-05-30 PROCEDURE — 97112 NEUROMUSCULAR REEDUCATION: CPT | Mod: GO | Performed by: OCCUPATIONAL THERAPIST

## 2023-05-30 PROCEDURE — 97530 THERAPEUTIC ACTIVITIES: CPT | Mod: GO | Performed by: OCCUPATIONAL THERAPIST

## 2023-06-06 ENCOUNTER — THERAPY VISIT (OUTPATIENT)
Dept: OCCUPATIONAL THERAPY | Facility: CLINIC | Age: 58
End: 2023-06-06
Attending: ORTHOPAEDIC SURGERY
Payer: COMMERCIAL

## 2023-06-06 DIAGNOSIS — S62.102A WRIST FRACTURE, LEFT, CLOSED, INITIAL ENCOUNTER: Primary | ICD-10-CM

## 2023-06-06 PROCEDURE — 97110 THERAPEUTIC EXERCISES: CPT | Mod: GO | Performed by: OCCUPATIONAL THERAPIST

## 2023-06-06 PROCEDURE — 97140 MANUAL THERAPY 1/> REGIONS: CPT | Mod: GO | Performed by: OCCUPATIONAL THERAPIST

## 2023-06-06 PROCEDURE — 97530 THERAPEUTIC ACTIVITIES: CPT | Mod: GO | Performed by: OCCUPATIONAL THERAPIST

## 2023-06-14 ENCOUNTER — THERAPY VISIT (OUTPATIENT)
Dept: OCCUPATIONAL THERAPY | Facility: CLINIC | Age: 58
End: 2023-06-14
Attending: ORTHOPAEDIC SURGERY
Payer: COMMERCIAL

## 2023-06-14 DIAGNOSIS — S62.102A WRIST FRACTURE, LEFT, CLOSED, INITIAL ENCOUNTER: Primary | ICD-10-CM

## 2023-06-14 PROCEDURE — 97530 THERAPEUTIC ACTIVITIES: CPT | Mod: GO | Performed by: OCCUPATIONAL THERAPIST

## 2023-06-14 PROCEDURE — 97112 NEUROMUSCULAR REEDUCATION: CPT | Mod: GO | Performed by: OCCUPATIONAL THERAPIST

## 2023-06-14 PROCEDURE — 97110 THERAPEUTIC EXERCISES: CPT | Mod: GO | Performed by: OCCUPATIONAL THERAPIST

## 2023-06-14 PROCEDURE — 97140 MANUAL THERAPY 1/> REGIONS: CPT | Mod: GO | Performed by: OCCUPATIONAL THERAPIST

## 2023-06-16 DIAGNOSIS — B00.9 RECURRENT HERPES SIMPLEX: ICD-10-CM

## 2023-06-16 RX ORDER — VALACYCLOVIR HYDROCHLORIDE 500 MG/1
TABLET, FILM COATED ORAL
Qty: 30 TABLET | Refills: 0 | Status: SHIPPED | OUTPATIENT
Start: 2023-06-16 | End: 2023-11-27

## 2023-06-16 NOTE — TELEPHONE ENCOUNTER
"Requested Prescriptions   Pending Prescriptions Disp Refills    valACYclovir (VALTREX) 500 MG tablet [Pharmacy Med Name: valACYclovir HCl 500 MG Oral Tablet] 30 tablet 0     Sig: Take 1 tablet by mouth once daily       Antivirals for Herpes Protocol Failed - 6/16/2023 10:10 AM        Failed - Recent (12 mo) or future (30 days) visit within the authorizing provider's specialty     Patient has had an office visit with the authorizing provider or a provider within the authorizing providers department within the previous 12 mos or has a future within next 30 days. See \"Patient Info\" tab in inbasket, or \"Choose Columns\" in Meds & Orders section of the refill encounter.              Failed - Normal serum creatinine on file in past 12 months     Recent Labs   Lab Test 03/23/22  1058   CR 0.72       Ok to refill medication if creatinine is low          Passed - Patient is age 12 or older        Passed - Medication is active on med list             "

## 2023-06-29 ENCOUNTER — ANCILLARY ORDERS (OUTPATIENT)
Dept: FAMILY MEDICINE | Facility: CLINIC | Age: 58
End: 2023-06-29

## 2023-06-29 ENCOUNTER — HOSPITAL ENCOUNTER (OUTPATIENT)
Dept: MAMMOGRAPHY | Facility: CLINIC | Age: 58
Discharge: HOME | End: 2023-06-29
Attending: FAMILY MEDICINE | Admitting: FAMILY MEDICINE
Payer: COMMERCIAL

## 2023-06-29 DIAGNOSIS — Z12.31 VISIT FOR SCREENING MAMMOGRAM: ICD-10-CM

## 2023-06-29 PROCEDURE — 77067 SCR MAMMO BI INCL CAD: CPT

## 2023-07-05 PROBLEM — S62.102A WRIST FRACTURE, LEFT, CLOSED, INITIAL ENCOUNTER: Status: RESOLVED | Noted: 2023-05-22 | Resolved: 2023-07-05

## 2023-07-05 NOTE — PROGRESS NOTES
DISCHARGE  Reason for Discharge: Patient chooses to discontinue therapy.  Pt at this time would like to discontinue therapy and continue on her own. Cancelled all upcoming appointments.       Discharge Plan: Patient to continue home program.    Referring Provider:  Latrell Hamlin, OTR/L, CHT

## 2023-11-27 ENCOUNTER — OFFICE VISIT (OUTPATIENT)
Dept: FAMILY MEDICINE | Facility: CLINIC | Age: 58
End: 2023-11-27
Payer: COMMERCIAL

## 2023-11-27 VITALS
TEMPERATURE: 98.8 F | HEIGHT: 63 IN | BODY MASS INDEX: 21.76 KG/M2 | DIASTOLIC BLOOD PRESSURE: 80 MMHG | OXYGEN SATURATION: 100 % | HEART RATE: 71 BPM | RESPIRATION RATE: 12 BRPM | SYSTOLIC BLOOD PRESSURE: 128 MMHG | WEIGHT: 122.8 LBS

## 2023-11-27 DIAGNOSIS — Z12.4 CERVICAL CANCER SCREENING: ICD-10-CM

## 2023-11-27 DIAGNOSIS — M54.50 CHRONIC BILATERAL LOW BACK PAIN WITHOUT SCIATICA: ICD-10-CM

## 2023-11-27 DIAGNOSIS — Z12.11 SCREEN FOR COLON CANCER: ICD-10-CM

## 2023-11-27 DIAGNOSIS — Z13.220 LIPID SCREENING: ICD-10-CM

## 2023-11-27 DIAGNOSIS — Z00.00 ROUTINE GENERAL MEDICAL EXAMINATION AT A HEALTH CARE FACILITY: Primary | ICD-10-CM

## 2023-11-27 DIAGNOSIS — G89.29 CHRONIC BILATERAL LOW BACK PAIN WITHOUT SCIATICA: ICD-10-CM

## 2023-11-27 DIAGNOSIS — B00.9 RECURRENT HERPES SIMPLEX: ICD-10-CM

## 2023-11-27 PROCEDURE — G0124 SCREEN C/V THIN LAYER BY MD: HCPCS

## 2023-11-27 PROCEDURE — 99396 PREV VISIT EST AGE 40-64: CPT | Performed by: NURSE PRACTITIONER

## 2023-11-27 PROCEDURE — G0145 SCR C/V CYTO,THINLAYER,RESCR: HCPCS | Performed by: NURSE PRACTITIONER

## 2023-11-27 PROCEDURE — 87624 HPV HI-RISK TYP POOLED RSLT: CPT | Performed by: NURSE PRACTITIONER

## 2023-11-27 PROCEDURE — 99213 OFFICE O/P EST LOW 20 MIN: CPT | Mod: 25 | Performed by: NURSE PRACTITIONER

## 2023-11-27 RX ORDER — VALACYCLOVIR HYDROCHLORIDE 500 MG/1
TABLET, FILM COATED ORAL
Qty: 90 TABLET | Refills: 3 | Status: SHIPPED | OUTPATIENT
Start: 2023-11-27

## 2023-11-27 RX ORDER — IBUPROFEN 800 MG/1
TABLET, FILM COATED ORAL
Qty: 60 TABLET | Refills: 3 | Status: SHIPPED | OUTPATIENT
Start: 2023-11-27

## 2023-11-27 ASSESSMENT — ENCOUNTER SYMPTOMS
HEMATURIA: 0
JOINT SWELLING: 0
CHILLS: 0
DIARRHEA: 0
HEADACHES: 0
HEMATOCHEZIA: 0
ABDOMINAL PAIN: 0
CONSTIPATION: 0
ARTHRALGIAS: 0
FREQUENCY: 0
PARESTHESIAS: 0
FEVER: 0
EYE PAIN: 0
BREAST MASS: 0
SHORTNESS OF BREATH: 0
DYSURIA: 0
PALPITATIONS: 0
MYALGIAS: 0
HEARTBURN: 0
COUGH: 0
WEAKNESS: 0
DIZZINESS: 0
NAUSEA: 0
SORE THROAT: 0
NERVOUS/ANXIOUS: 0

## 2023-11-27 ASSESSMENT — PAIN SCALES - GENERAL: PAINLEVEL: NO PAIN (0)

## 2023-11-27 NOTE — PATIENT INSTRUCTIONS
[phone] to schedule fasting labs    Preventive Health Recommendations  Female Ages 50 - 64    Yearly exam: See your health care provider every year in order to  Review health changes.   Discuss preventive care.    Review your medicines if your doctor has prescribed any.    Get a Pap test every three years (unless you have an abnormal result and your provider advises testing more often).  If you get Pap tests with HPV test, you only need to test every 5 years, unless you have an abnormal result.   You do not need a Pap test if your uterus was removed (hysterectomy) and you have not had cancer.  You should be tested each year for STDs (sexually transmitted diseases) if you're at risk.   Have a mammogram every 1 to 2 years.  Have a colonoscopy at age 45, or have a yearly FIT test (stool test). These exams screen for colon cancer.    Have a cholesterol test every 5 years, or more often if advised.  Have a diabetes test (fasting glucose) every three years. If you are at risk for diabetes, you should have this test more often.   If you are at risk for osteoporosis (brittle bone disease), think about having a bone density scan (DEXA).    Shots: Get a flu shot each year. Get a tetanus shot every 10 years.    Nutrition:   Eat at least 5 servings of fruits and vegetables each day.  Eat whole-grain bread, whole-wheat pasta and brown rice instead of white grains and rice.  Get adequate Calcium and Vitamin D.     Lifestyle  Exercise at least 150 minutes a week (30 minutes a day, 5 days a week). This will help you control your weight and prevent disease.  Limit alcohol to one drink per day.  No smoking.   Wear sunscreen to prevent skin cancer.   See your dentist every six months for an exam and cleaning.  See your eye doctor every 1 to 2 years.

## 2023-11-27 NOTE — PROGRESS NOTES
SUBJECTIVE:   Lorraine is a 57 year old, presenting for the following:  Physical        2023    12:49 PM   Additional Questions   Roomed by Emeterio Hou CMA       Healthy Habits:     Getting at least 3 servings of Calcium per day:  NO    Bi-annual eye exam:  Yes    Dental care twice a year:  Yes    Sleep apnea or symptoms of sleep apnea:  None    Diet:  Regular (no restrictions)    Frequency of exercise:  None    Taking medications regularly:  Yes    Barriers to taking medications:  None    Medication side effects:  Not applicable    Additional concerns today:  No      Today's PHQ-2 Score:       2023    12:47 PM   PHQ-2 (  [employer])   Q1: Little interest or pleasure in doing things 0   Q2: Feeling down, depressed or hopeless 0   PHQ-2 Score 0   Q1: Little interest or pleasure in doing things Not at all   Q2: Feeling down, depressed or hopeless Not at all   PHQ-2 Score 0         Social History     Tobacco Use    Smoking status: Former     Packs/day: 0.50     Years: 20.00     Additional pack years: 0.00     Total pack years: 10.00     Types: Cigarettes     Quit date: 2012     Years since quitting: 10.9    Smokeless tobacco: Never   Substance Use Topics    Alcohol use: Yes     Alcohol/week: 10.0 standard drinks of alcohol     Comment: glass of wine daily and few on weekends             2023    12:47 PM   Alcohol Use   Prescreen: >3 drinks/day or >7 drinks/week? Not Applicable     Reviewed orders with patient.  Reviewed health maintenance and updated orders accordingly - Yes  Lab work is in process    Breast Cancer Screenin/30/2021    10:52 AM   Breast CA Risk Assessment (FHS-7)   Do you have a family history of breast, colon, or ovarian cancer? No / Unknown       click delete button to remove this line now  Mammogram Screening: Recommended mammography every 1-2 years with patient discussion and risk factor consideration  Pertinent mammograms are reviewed under the imaging  tab.    History of abnormal Pap smear: YES - updated in Problem List and Health Maintenance accordingly      Latest Ref Rng & Units 12/30/2021    11:32 AM 3/5/2020     7:15 AM 3/5/2020     7:13 AM   PAP / HPV   PAP  Atypical squamous cells of undetermined significance (ASC-US)      PAP (Historical)    NIL    HPV 16 DNA Negative Negative  Negative     HPV 18 DNA Negative Negative  Negative     Other HR HPV Negative Positive  Positive       Reviewed and updated as needed this visit by clinical staff   Tobacco  Allergies  Meds              Reviewed and updated as needed this visit by Provider                 Past Medical History:   Diagnosis Date    Abnormal Pap smear of cervix 08/22/2007    See problem list    Adenomatous polyp of colon 7/2014    q 5 yr colonoscopys    Cervical high risk HPV (human papillomavirus) test positive 8/22/07, 9/14/18    See problem list    HSV-1 infection     cold sores on lip      Past Surgical History:   Procedure Laterality Date    COLONOSCOPY  7/11/2014    Procedure: COMBINED COLONOSCOPY, SINGLE BIOPSY/POLYPECTOMY BY BIOPSY;  Surgeon: Mendoza Clifford MD;  Location:  GI    DILATION AND CURETTAGE, HYSTEROSCOPY, ABLATE ENDOMETRIUM NOVASURE, COMBINED  6/26/2012    Procedure: COMBINED DILATION AND CURETTAGE, HYSTEROSCOPY, ABLATE ENDOMETRIUM NOVASURE;  Hysteroscopy Dilation and Curettage, Novasure;  Surgeon: Bola Calhoun MD;  Location:  OR    NO HISTORY OF SURGERY         Review of Systems   Constitutional:  Negative for chills and fever.   HENT:  Negative for congestion, ear pain, hearing loss and sore throat.    Eyes:  Negative for pain and visual disturbance.   Respiratory:  Negative for cough and shortness of breath.    Cardiovascular:  Negative for chest pain, palpitations and peripheral edema.   Gastrointestinal:  Negative for abdominal pain, constipation, diarrhea, heartburn, hematochezia and nausea.   Breasts:  Negative for tenderness, breast mass and  "discharge.   Genitourinary:  Negative for dysuria, frequency, genital sores, hematuria, pelvic pain, urgency, vaginal bleeding and vaginal discharge.   Musculoskeletal:  Negative for arthralgias, joint swelling and myalgias.   Skin:  Negative for rash.   Neurological:  Negative for dizziness, weakness, headaches and paresthesias.   Psychiatric/Behavioral:  Negative for mood changes. The patient is not nervous/anxious.         OBJECTIVE:   /80 (BP Location: Right arm, Patient Position: Chair, Cuff Size: Adult Regular)   Pulse 71   Temp 98.8  F (37.1  C) (Temporal)   Resp 12   Ht 1.6 m (5' 3\")   Wt 55.7 kg (122 lb 12.8 oz)   SpO2 100%   BMI 21.75 kg/m    Physical Exam  GENERAL APPEARANCE: healthy, alert and no distress  EYES: Eyes grossly normal to inspection, PERRL and conjunctivae and sclerae normal  HENT: ear canals and TM's normal, nose and mouth without ulcers or lesions, oropharynx clear and oral mucous membranes moist  NECK: no adenopathy, no asymmetry, masses, or scars and thyroid normal to palpation  RESP: lungs clear to auscultation - no rales, rhonchi or wheezes  CV: regular rate and rhythm, normal S1 S2, no S3 or S4, no murmur, click or rub, no peripheral edema and peripheral pulses strong  ABDOMEN: soft, nontender, no hepatosplenomegaly, no masses and bowel sounds normal  MS: no musculoskeletal defects are noted and gait is age appropriate without ataxia  SKIN: no suspicious lesions or rashes  NEURO: Normal strength and tone, sensory exam grossly normal, mentation intact and speech normal  PSYCH: mentation appears normal and affect normal/bright    Diagnostic Test Results:  Labs reviewed in Epic  none     ASSESSMENT/PLAN:   (Z00.00) Routine general medical examination at a health care facility  (primary encounter diagnosis)  Comment: recommend yearly physicals.  Will make an appointment for fasting labs.  Declined vaccines    (B00.9) Recurrent herpes simplex  Comment: takes daily  Plan: " valACYclovir (VALTREX) 500 MG tablet, Basic         metabolic panel  (Ca, Cl, CO2, Creat, Gluc, K,         Na, BUN)    (M54.50,  G89.29) Chronic bilateral low back pain without sciatica  Comment: uses prn- states works better than OTC  Plan: ibuprofen (ADVIL/MOTRIN) 800 MG tablet    (Z12.4) Cervical cancer screening  Comment: did not follow up in 2021.  Repeat pap today  Plan: Pap Screen with HPV - recommended age 30 - 65         years    (Z12.11) Screen for colon cancer  Comment: screen- history polyps  Plan: Colonoscopy Screening  Referral    (Z13.220) Lipid screening  Comment: not fasting today- future orders placed  Plan: Lipid panel reflex to direct LDL Fasting          Patient has been advised of split billing requirements and indicates understanding: Yes      COUNSELING:  Reviewed preventive health counseling, as reflected in patient instructions        She reports that she quit smoking about 10 years ago. Her smoking use included cigarettes. She has a 10.00 pack-year smoking history. She has never used smokeless tobacco.          Megan Chua NP  [facility]

## 2023-11-30 LAB
BKR LAB AP GYN ADEQUACY: ABNORMAL
BKR LAB AP GYN INTERPRETATION: ABNORMAL
BKR LAB AP HPV REFLEX: ABNORMAL
BKR LAB AP PREVIOUS ABNL DX: ABNORMAL
BKR LAB AP PREVIOUS ABNORMAL: ABNORMAL
PATH REPORT.COMMENTS IMP SPEC: ABNORMAL
PATH REPORT.COMMENTS IMP SPEC: ABNORMAL
PATH REPORT.RELEVANT HX SPEC: ABNORMAL

## 2023-12-04 LAB
HUMAN PAPILLOMA VIRUS 16 DNA: NEGATIVE
HUMAN PAPILLOMA VIRUS 18 DNA: NEGATIVE
HUMAN PAPILLOMA VIRUS FINAL DIAGNOSIS: ABNORMAL
HUMAN PAPILLOMA VIRUS OTHER HR: POSITIVE

## 2023-12-07 ENCOUNTER — PATIENT OUTREACH (OUTPATIENT)
Dept: FAMILY MEDICINE | Facility: CLINIC | Age: 58
End: 2023-12-07
Payer: COMMERCIAL

## 2023-12-07 NOTE — LETTER
May 8, 2024      Lorraine Hunter  [address] [place] [address]        Dear ,    At [facility], your health and wellness are our primary concern. That is why we are following up on your most recent abnormal Pap smear and positive high-risk Human Papillomavirus (HPV) test.    Please call [phone] to schedule an appointment for your recommended follow-up Colposcopy (this cannot be scheduled through [facility]) at your earliest convenience.     If you have completed the appointment outside of the [facility] system, please have the records forwarded to our office. We will update your chart for your provider to review before your next annual wellness visit.     Thank you for choosing [facility]!      Sincerely,    Your [facility] Care Team

## 2023-12-07 NOTE — LETTER
March 12, 2024      Lorraine Hunter  [address] [place] [address]        Dear ,    At [facility], your health and wellness are our primary concern. That is why we are following up on your most recent abnormal Pap smear and positive high-risk Human Papillomavirus (HPV) test.    Please call [phone] to schedule an appointment for your recommended follow-up Colposcopy (this cannot be scheduled through [facility]) at your earliest convenience.      If you have completed the appointment outside of the [facility] system, please have the records forwarded to our office. We will update your chart for your provider to review before your next annual wellness visit.     Thank you for choosing [facility]!      Sincerely,    Your [facility] Care Team

## 2023-12-15 ENCOUNTER — PATIENT OUTREACH (OUTPATIENT)
Dept: GASTROENTEROLOGY | Facility: CLINIC | Age: 58
End: 2023-12-15
Payer: COMMERCIAL

## 2024-02-15 ENCOUNTER — MEDICAL CORRESPONDENCE (OUTPATIENT)
Dept: HEALTH INFORMATION MANAGEMENT | Facility: CLINIC | Age: 59
End: 2024-02-15

## 2024-02-15 DIAGNOSIS — Z01.810 PRE-OPERATIVE CARDIOVASCULAR EXAMINATION: ICD-10-CM

## 2024-02-15 DIAGNOSIS — B00.9 HERPESVIRAL INFECTION: Primary | ICD-10-CM

## 2024-02-15 NOTE — PROGRESS NOTES
Referral received from [facility] for EKG for pre op.   EKG order is pended to be signed.   Mitzy Lo on 2/15/2024 at 11:49 AM

## 2024-02-15 NOTE — PROGRESS NOTES
Referral for EKG was received. Order has been placed.    Macey Downey RN   [facility]

## 2024-02-19 ENCOUNTER — TELEPHONE (OUTPATIENT)
Dept: FAMILY MEDICINE | Facility: CLINIC | Age: 59
End: 2024-02-19
Payer: COMMERCIAL

## 2024-02-19 NOTE — TELEPHONE ENCOUNTER
Called and LVM advising to call back for clarification on upcoming appointment on 2/2.   See message below,    Per provider:  Lissa Boles, APRN CNP  [facility]  This patient already has an order for EKG placed by a different provider, so I am not sure why they are on my schedule. I have not seen this patient or evaluated them so unless they are doing a pre-op with me I am not ordering an EKG. Were they trying to schedule an ANS-only appointment for an EKG?    Thank you,  Cecile JHA    [phone]

## 2024-02-19 NOTE — TELEPHONE ENCOUNTER
Called Lorraine for clarification-  She has already had a pre op,  but per clinic policy if we receive an outside order for an EKG it has to be placed on a provider schedule  I change the appointment type to an office visit  Let me know if you have any further questions or concerns and we can reach out to patient   Thank you,  Cecile JHA    [phone]

## 2024-02-20 NOTE — PATIENT INSTRUCTIONS
Preparing for Your Surgery  Getting started  A nurse will call you to review your health history and instructions. They will give you an arrival time based on your scheduled surgery time. Please be ready to share:  Your doctor's clinic name and phone number  Your medical, surgical, and anesthesia history  A list of allergies and sensitivities  A list of medicines, including herbal treatments and over-the-counter drugs  Whether the patient has a legal guardian (ask how to send us the papers in advance)  Please tell us if you're pregnant--or if there's any chance you might be pregnant. Some surgeries may injure a fetus (unborn baby), so they require a pregnancy test. Surgeries that are safe for a fetus don't always need a test, and you can choose whether to have one.   If you have a child who's having surgery, please ask for a copy of Preparing for Your Child's Surgery.    Preparing for surgery  Within 10 to 30 days of surgery: Have a pre-op exam (sometimes called an H&P, or History and Physical). This can be done at a clinic or pre-operative center.  If you're having a , you may not need this exam. Talk to your care team.  At your pre-op exam, talk to your care team about all medicines you take. If you need to stop any medicines before surgery, ask when to start taking them again.  We do this for your safety. Many medicines can make you bleed too much during surgery. Some change how well surgery (anesthesia) drugs work.  Call your insurance company to let them know you're having surgery. (If you don't have insurance, call [phone].)  Call your clinic if there's any change in your health. This includes signs of a cold or flu (sore throat, runny nose, cough, rash, fever). It also includes a scrape or scratch near the surgery site.  If you have questions on the day of surgery, call your hospital or surgery center.  Eating and drinking guidelines  For your safety: Unless your surgeon tells you otherwise,  follow the guidelines below.  Eat and drink as usual until 8 hours before you arrive for surgery. After that, no food or milk.  Drink clear liquids until 2 hours before you arrive. These are liquids you can see through, like water, Gatorade, and Propel Water. They also include plain black coffee and tea (no cream or milk), candy, and breath mints. You can spit out gum when you arrive.  If you drink alcohol: Stop drinking it the night before surgery.  If your care team tells you to take medicine on the morning of surgery, it's okay to take it with a sip of water.  Preventing infection  Shower or bathe the night before and morning of your surgery. Follow the instructions your clinic gave you. (If no instructions, use regular soap.)  Don't shave or clip hair near your surgery site. We'll remove the hair if needed.  Don't smoke or vape the morning of surgery. You may chew nicotine gum up to 2 hours before surgery. A nicotine patch is okay.  Note: Some surgeries require you to completely quit smoking and nicotine. Check with your surgeon.  Your care team will make every effort to keep you safe from infection. We will:  Clean our hands often with soap and water (or an alcohol-based hand rub).  Clean the skin at your surgery site with a special soap that kills germs.  Give you a special gown to keep you warm. (Cold raises the risk of infection.)  Wear special hair covers, masks, gowns and gloves during surgery.  Give antibiotic medicine, if prescribed. Not all surgeries need antibiotics.  What to bring on the day of surgery  Photo ID and insurance card  Copy of your health care directive, if you have one  Glasses and hearing aids (bring cases)  You can't wear contacts during surgery  Inhaler and eye drops, if you use them (tell us about these when you arrive)  CPAP machine or breathing device, if you use them  A few personal items, if spending the night  If you have . . .  A pacemaker, ICD (cardiac defibrillator) or other  implant: Bring the ID card.  An implanted stimulator: Bring the remote control.  A legal guardian: Bring a copy of the certified (court-stamped) guardianship papers.  Please remove any jewelry, including body piercings. Leave jewelry and other valuables at home.  If you're going home the day of surgery  You must have a responsible adult drive you home. They should stay with you overnight as well.  If you don't have someone to stay with you, and you aren't safe to go home alone, we may keep you overnight. Insurance often won't pay for this.  After surgery  If it's hard to control your pain or you need more pain medicine, please call your surgeon's office.  Questions?   If you have any questions for your care team, list them here: _________________________________________________________________________________________________________________________________________________________________________ ____________________________________ ____________________________________ ____________________________________  For informational purposes only. Not to replace the advice of your health care provider. Copyright   2003, 2019 [place] [employer] [facility]. All rights reserved. Clinically reviewed by Radha Mccray MD. [employer] 485812 - REV 12/22.    How to Take Your Medication Before Surgery  - Take all of your medications before surgery as usual

## 2024-02-20 NOTE — TELEPHONE ENCOUNTER
Patient would now like to move forward with doing the pre op, including EKG here at the clinic  Changed appointment back to pre op visit type and routing to provider as [place]    Thank you,  Cecile JHA    [phone]

## 2024-02-21 ENCOUNTER — OFFICE VISIT (OUTPATIENT)
Dept: FAMILY MEDICINE | Facility: CLINIC | Age: 59
End: 2024-02-21
Payer: COMMERCIAL

## 2024-02-21 VITALS
OXYGEN SATURATION: 99 % | SYSTOLIC BLOOD PRESSURE: 146 MMHG | TEMPERATURE: 98.3 F | BODY MASS INDEX: 21.62 KG/M2 | WEIGHT: 122 LBS | HEART RATE: 74 BPM | RESPIRATION RATE: 16 BRPM | HEIGHT: 63 IN | DIASTOLIC BLOOD PRESSURE: 82 MMHG

## 2024-02-21 DIAGNOSIS — S62.102S CLOSED FRACTURE OF LEFT WRIST, SEQUELA: ICD-10-CM

## 2024-02-21 DIAGNOSIS — Z01.818 PREOP GENERAL PHYSICAL EXAM: Primary | ICD-10-CM

## 2024-02-21 DIAGNOSIS — Z13.220 LIPID SCREENING: ICD-10-CM

## 2024-02-21 LAB
BASOPHILS # BLD AUTO: 0 10E3/UL (ref 0–0.2)
BASOPHILS NFR BLD AUTO: 1 %
EOSINOPHIL # BLD AUTO: 0.2 10E3/UL (ref 0–0.7)
EOSINOPHIL NFR BLD AUTO: 3 %
ERYTHROCYTE [DISTWIDTH] IN BLOOD BY AUTOMATED COUNT: 12.4 % (ref 10–15)
HCT VFR BLD AUTO: 42.8 % (ref 35–47)
HGB BLD-MCNC: 14.1 G/DL (ref 11.7–15.7)
IMM GRANULOCYTES # BLD: 0 10E3/UL
IMM GRANULOCYTES NFR BLD: 0 %
LYMPHOCYTES # BLD AUTO: 1.6 10E3/UL (ref 0.8–5.3)
LYMPHOCYTES NFR BLD AUTO: 28 %
MCH RBC QN AUTO: 31.5 PG (ref 26.5–33)
MCHC RBC AUTO-ENTMCNC: 32.9 G/DL (ref 31.5–36.5)
MCV RBC AUTO: 96 FL (ref 78–100)
MONOCYTES # BLD AUTO: 0.6 10E3/UL (ref 0–1.3)
MONOCYTES NFR BLD AUTO: 11 %
NEUTROPHILS # BLD AUTO: 3.2 10E3/UL (ref 1.6–8.3)
NEUTROPHILS NFR BLD AUTO: 57 %
PLATELET # BLD AUTO: 226 10E3/UL (ref 150–450)
RBC # BLD AUTO: 4.48 10E6/UL (ref 3.8–5.2)
WBC # BLD AUTO: 5.7 10E3/UL (ref 4–11)

## 2024-02-21 PROCEDURE — 80061 LIPID PANEL: CPT | Performed by: NURSE PRACTITIONER

## 2024-02-21 PROCEDURE — 99214 OFFICE O/P EST MOD 30 MIN: CPT | Mod: 25 | Performed by: NURSE PRACTITIONER

## 2024-02-21 PROCEDURE — 80048 BASIC METABOLIC PNL TOTAL CA: CPT | Performed by: NURSE PRACTITIONER

## 2024-02-21 PROCEDURE — 93000 ELECTROCARDIOGRAM COMPLETE: CPT | Performed by: NURSE PRACTITIONER

## 2024-02-21 PROCEDURE — 36415 COLL VENOUS BLD VENIPUNCTURE: CPT | Performed by: NURSE PRACTITIONER

## 2024-02-21 PROCEDURE — 85025 COMPLETE CBC W/AUTO DIFF WBC: CPT | Performed by: NURSE PRACTITIONER

## 2024-02-21 ASSESSMENT — PAIN SCALES - GENERAL: PAINLEVEL: NO PAIN (0)

## 2024-02-21 NOTE — PROGRESS NOTES
Preoperative Evaluation  [facility]  [address] [facility] [address]  Phone: [phone]  Primary Provider: Megan Chua  Pre-op Performing Provider: KRYSTIAN GARY  Feb 21, 2024       Lorraine is a 58 year old, presenting for the following:  Pre-Op Exam        2/21/2024     3:44 PM   Additional Questions   Roomed by Kristal IRENE   Accompanied by self     Surgical Information  Surgery/Procedure: left wrist distal radius osteotomy with bone graft, possible left endoscopic carpal tunnel release   Surgery Location: [facility]   Surgeon: Santy Davison MD  Surgery Date: 03/06/2024  Time of Surgery: TBD  Where patient plans to recover: At home with family  Fax number for surgical facility: [phone]    Assessment & Plan     The proposed surgical procedure is considered LOW risk.    Preop general physical exam    - EKG 12-lead complete w/read - Clinics  - CBC with Platelets & Differential; Future  - Basic metabolic panel; Future    Closed fracture of left wrist, sequela  -with plans for bone graft and repair            - No identified additional risk factors other than previously addressed    Antiplatelet or Anticoagulation Medication Instructions   - Patient is on no antiplatelet or anticoagulation medications.    Additional Medication Instructions  Patient is on no additional chronic medications    Recommendation  APPROVAL GIVEN to proceed with proposed procedure, without further diagnostic evaluation.    Ordering of each unique test      Subjective       Via the Health Maintenance questionnaire, the patient has reported the following services have been completed -Cervical Cancer Screening, this information has been sent to the abstraction team.    HPI related to upcoming procedure: Broke wrist and didn't heal correctly, needs bone grafting and carpal tunnel release.         2/21/2024     3:40 PM   Preop Questions   1. Have you ever had a heart attack or stroke? No    2. Have you ever had surgery on your heart or blood vessels, such as a stent placement, a coronary artery bypass, or surgery on an artery in your head, neck, heart, or legs? No   3. Do you have chest pain with activity? No   4. Do you have a history of  heart failure? No   5. Do you currently have a cold, bronchitis or symptoms of other infection? No   6. Do you have a cough, shortness of breath, or wheezing? No   7. Do you or anyone in your family have previous history of blood clots? No   8. Do you or does anyone in your family have a serious bleeding problem such as prolonged bleeding following surgeries or cuts? No   9. Have you ever had problems with anemia or been told to take iron pills? No   10. Have you had any abnormal blood loss such as black, tarry or bloody stools, or abnormal vaginal bleeding? No   11. Have you ever had a blood transfusion? No   12. Are you willing to have a blood transfusion if it is medically needed before, during, or after your surgery? Yes   13. Have you or any of your relatives ever had problems with anesthesia? No   14. Do you have sleep apnea, excessive snoring or daytime drowsiness? No   15. Do you have any artifical heart valves or other implanted medical devices like a pacemaker, defibrillator, or continuous glucose monitor? No   16. Do you have artificial joints? No   17. Are you allergic to latex? No   18. Is there any chance that you may be pregnant? No       Health Care Directive  Patient does not have a Health Care Directive or Living Will: Discussed advance care planning with patient; however, patient declined at this time.    Preoperative Review of    reviewed - no record of controlled substances prescribed.          Patient Active Problem List    Diagnosis Date Noted    History of cold sores 09/03/2014     Priority: Medium    Adenomatous polyp of colon 07/01/2014     Priority: Medium     q 5 yr colonoscopys      CARDIOVASCULAR SCREENING; LDL GOAL LESS THAN 160  12/20/2010     Priority: Medium    Diffuse cystic mastopathy 08/22/2007     Priority: Medium    Cervical high risk HPV (human papillomavirus) test positive 08/22/2007     Priority: Medium     8/22/07 ASCUS pap, + HR HPV 53.  9/28/07 [place]- Negative  2008, 2009, 2012 NIL paps, Neg HPV.  2015 NIL pap.  9/14/18 NIL pap, + HR HPV (not 16 or 18). Plan cotest in 1 year  11/11/19 Lost to follow-up for pap tracking  3/5/20 NIL pap, +HR HPV (not 16/18). Plan: colposcopy  11/30/20 Lost to follow-up for pap tracking  12/30/21 ASCUS pap, + HR HPV (not 16 or 18). Plan: [place] bef 3/30/22   7/5/22 Lost to follow-up for pap tracking  11/27/23 ASCUS pap, + HR HPV (not 16 or 18). Plan: colp bef 2/27/24 12/8/23 LM for pt to call us back.   12/11/23 LM for pt to call back. Left detailed msg per pt request  2/23/24 COLP              Past Medical History:   Diagnosis Date    Abnormal Pap smear of cervix 08/22/2007    See problem list    Adenomatous polyp of colon 7/2014    q 5 yr colonoscopys    Cervical high risk HPV (human papillomavirus) test positive 8/22/07, 9/14/18    See problem list    HSV-1 infection     cold sores on lip     Past Surgical History:   Procedure Laterality Date    COLONOSCOPY  7/11/2014    Procedure: COMBINED COLONOSCOPY, SINGLE BIOPSY/POLYPECTOMY BY BIOPSY;  Surgeon: Mendoza Clifford MD;  Location:  GI    DILATION AND CURETTAGE, HYSTEROSCOPY, ABLATE ENDOMETRIUM NOVASURE, COMBINED  6/26/2012    Procedure: COMBINED DILATION AND CURETTAGE, HYSTEROSCOPY, ABLATE ENDOMETRIUM NOVASURE;  Hysteroscopy Dilation and Curettage, Novasure;  Surgeon: Bola Calhoun MD;  Location:  OR    NO HISTORY OF SURGERY       Current Outpatient Medications   Medication Sig Dispense Refill    calcium carbonate 600 mg-vitamin D 400 units (CALTRATE) 600-400 MG-UNIT per tablet Take 1 tablet by mouth 2 times daily      ibuprofen (ADVIL/MOTRIN) 800 MG tablet TAKE 1 TABLET BY MOUTH EVERY 8 HOURS AS NEEDED FOR MODERATE PAIN 60  "tablet 3    NONFORMULARY Vitamin D      valACYclovir (VALTREX) 500 MG tablet Take 1 tablet by mouth once daily 90 tablet 3       No Known Allergies     Social History     Tobacco Use    Smoking status: Former     Packs/day: 0.50     Years: 20.00     Additional pack years: 0.00     Total pack years: 10.00     Types: Cigarettes     Quit date: 2012     Years since quittin.1    Smokeless tobacco: Never   Substance Use Topics    Alcohol use: Yes     Alcohol/week: 10.0 standard drinks of alcohol     Comment: glass of wine daily and few on weekends       History   Drug Use No         Review of Systems    Review of Systems  Constitutional, HEENT, cardiovascular, pulmonary, GI, , musculoskeletal, neuro, skin, endocrine and psych systems are negative, except as otherwise noted.  Objective    There were no vitals taken for this visit.   Estimated body mass index is 21.75 kg/m  as calculated from the following:    Height as of 23: 1.6 m (5' 3\").    Weight as of 23: 55.7 kg (122 lb 12.8 oz).  Physical Exam  GENERAL: alert and no distress  EYES: Eyes grossly normal to inspection, PERRL and conjunctivae and sclerae normal  HENT: ear canals and TM's normal, nose and mouth without ulcers or lesions  NECK: no adenopathy, no asymmetry, masses, or scars  RESP: lungs clear to auscultation - no rales, rhonchi or wheezes  CV: regular rate and rhythm, normal S1 S2, no S3 or S4, no murmur, click or rub, no peripheral edema  ABDOMEN: soft, nontender, no hepatosplenomegaly, no masses and bowel sounds normal  MS: no gross musculoskeletal defects noted, no edema  SKIN: no suspicious lesions or rashes  NEURO: Normal strength and tone, mentation intact and speech normal  PSYCH: mentation appears normal, affect normal/bright    Recent Labs   Lab Test 22  1058   HGB 13.7         POTASSIUM 4.0   CR 0.72        Diagnostics  Labs pending at this time.  Results will be reviewed when available.   EKG: appears " normal, NSR, normal axis, normal intervals, no acute ST/T changes c/w ischemia, no LVH by voltage criteria, unchanged from previous tracings    Revised Cardiac Risk Index (RCRI)  The patient has the following serious cardiovascular risks for perioperative complications:   - No serious cardiac risks = 0 points     RCRI Interpretation: 0 points: Class I (very low risk - 0.4% complication rate)         Signed Electronically by: SONIA Guajardo CNP  Copy of this evaluation report is provided to requesting physician.

## 2024-02-22 ENCOUNTER — TELEPHONE (OUTPATIENT)
Dept: FAMILY MEDICINE | Facility: CLINIC | Age: 59
End: 2024-02-22
Payer: COMMERCIAL

## 2024-02-22 DIAGNOSIS — Z13.220 LIPID SCREENING: Primary | ICD-10-CM

## 2024-02-22 LAB
ANION GAP SERPL CALCULATED.3IONS-SCNC: 12 MMOL/L (ref 7–15)
BUN SERPL-MCNC: 18.4 MG/DL (ref 8–23)
CALCIUM SERPL-MCNC: 9.8 MG/DL (ref 8.8–10.2)
CHLORIDE SERPL-SCNC: 102 MMOL/L (ref 98–107)
CHOLEST SERPL-MCNC: 241 MG/DL
CREAT SERPL-MCNC: 0.85 MG/DL (ref 0.51–0.95)
DEPRECATED HCO3 PLAS-SCNC: 27 MMOL/L (ref 22–29)
EGFRCR SERPLBLD CKD-EPI 2021: 78 ML/MIN/1.73M2
FASTING STATUS PATIENT QL REPORTED: NO
GLUCOSE SERPL-MCNC: 95 MG/DL (ref 70–99)
HDLC SERPL-MCNC: 101 MG/DL
LDLC SERPL CALC-MCNC: 128 MG/DL
NONHDLC SERPL-MCNC: 140 MG/DL
POTASSIUM SERPL-SCNC: 4.4 MMOL/L (ref 3.4–5.3)
SODIUM SERPL-SCNC: 141 MMOL/L (ref 135–145)
TRIGL SERPL-MCNC: 61 MG/DL

## 2024-02-22 NOTE — TELEPHONE ENCOUNTER
----- Message from Megan Chua NP sent at 2/22/2024  4:46 PM CST -----  Please call patient and find out if she was fasting for this lab.  Megan Chua CNP

## 2024-02-22 NOTE — LETTER
[address]   [address]  Tel. [phone] / Fax [phone]    February 27, 2024        Lorraine Hunter  [address] [place] [id number]        Dear Lorraine:      LAB RESULTS:     Your cholesterol was elevated as you were not fasting for the test.  I did put in a future order that you can do at your convenience.  Nothing but water and black coffee for 8 hours prior to the test.  Please call [phone] to schedule.          Sincerely,      Megan Chua, CNP

## 2024-02-22 NOTE — PROGRESS NOTES
Pre op and EKG was faxed over to  surgery center.     Kaela CERVANTES,    [facility]

## 2024-03-26 ENCOUNTER — THERAPY VISIT (OUTPATIENT)
Dept: OCCUPATIONAL THERAPY | Facility: CLINIC | Age: 59
End: 2024-03-26
Attending: ORTHOPAEDIC SURGERY
Payer: COMMERCIAL

## 2024-03-26 ENCOUNTER — TRANSCRIBE ORDERS (OUTPATIENT)
Dept: OTHER | Age: 59
End: 2024-03-26

## 2024-03-26 DIAGNOSIS — M25.532 LEFT WRIST PAIN: ICD-10-CM

## 2024-03-26 DIAGNOSIS — Z47.89 AFTERCARE FOLLOWING SURGERY OF THE MUSCULOSKELETAL SYSTEM: Primary | ICD-10-CM

## 2024-03-26 PROCEDURE — 97110 THERAPEUTIC EXERCISES: CPT | Mod: GO | Performed by: OCCUPATIONAL THERAPIST

## 2024-03-26 PROCEDURE — 97140 MANUAL THERAPY 1/> REGIONS: CPT | Mod: GO | Performed by: OCCUPATIONAL THERAPIST

## 2024-03-26 PROCEDURE — 97165 OT EVAL LOW COMPLEX 30 MIN: CPT | Mod: GO | Performed by: OCCUPATIONAL THERAPIST

## 2024-03-26 NOTE — PROGRESS NOTES
OCCUPATIONAL THERAPY EVALUATION  Type of Visit: Evaluation    See electronic medical record for Abuse and Falls Screening details.    Subjective      Presenting condition or subjective complaint: L wrist  Date of onset: 03/22/24    Relevant medical history:     Dates & types of surgery:      Prior diagnostic imaging/testing results: X-ray     Prior therapy history for the same diagnosis, illness or injury: Yes last year after fracture    Prior Level of Function  Transfers: Independent  Ambulation: Independent  ADL: Independent  IADL:  independent    Living Environment  Social support: Alone   Type of home:     Stairs to enter the home:         Ramp:     Stairs inside the home:         Help at home:    Equipment owned:       Employment: Yes Accounts  Hobbies/Interests: Motorcycle,    Patient goals for therapy:  wrist back to normal     Objective   ADDITIONAL HISTORY:  Right hand dominant  Patient reports symptoms of pain, stiffness/loss of motion, weakness/loss of strength, and edema  Transportation: drives  Currently working in normal job without restrictions    Functional Outcome Measure:   22/80    PAIN:  Intermittent pain, no pain at rest  8-10/10 pain with movement/activity  Sharp, shooting, throbbing  No use makes it better  Movement makes it worse    EDEMA: Mild 16: L 14.5: R    SCAR/WOUND: healing incision site with steri-strips in plac, not ready to be removed at this time.     SENSATION: WNL throughout all nerve distributions; per patient report     ROM: Full composite fist  Wrist ROM  Left AROM   Extension 45   Flexion 15   Radial Deviation (RD) 10   Ulnar Deviation (UD) 20   UD with Th Flex    Supination 45   Pronation 45       OBSERVATIONS/APPEARANCE:  falls into an ulnar deviation pattern with AROM      RESISTED TESTING: NT    STRENGTH: NT         Assessment & Plan   CLINICAL IMPRESSIONS  Medical Diagnosis: Aftercare following surgery of the musculoskeletal system.    Treatment Diagnosis: wrist  stiffness. limited AROM. Pain.    Impression/Assessment: Pt is a 58 year old female presenting to Occupational Therapy due to post op wrist procedure.  The following significant findings have been identified: Impaired ROM, Impaired strength, and Pain.  These identified deficits interfere with their ability to perform self care tasks, work tasks, recreational activities, household chores, driving ,  yard work, and meal planning and preparation as compared to previous level of function.     Clinical Decision Making (Complexity):  Assessment of Occupational Performance: 1-3 Performance Deficits  Occupational Performance Limitations: bathing/showering, toileting, dressing, feeding, health management and maintenance, home establishment and management, meal preparation and cleanup, sleep, work, and leisure activities  Clinical Decision Making (Complexity): Low complexity    PLAN OF CARE  Treatment Interventions:  Modalities:  US  Therapeutic Exercise:  AROM, AAROM, PROM, Tendon Gliding, Blocking, Isotonics, Isometrics, and Stabilization  Neuromuscular re-education:  Nerve Gliding, Desensitization, Kinesthetic Training, Proprioceptive Training, Posture, Kinesiotaping, Isometrics, and Stabilization  Manual Techniques:  Joint mobilization, Scar mobilization, Friction massage, Myofascial release, and Manual edema mobilization  Orthotic Fabrication:  Static progressive    Long Term Goals   OT Goal 1  Goal Identifier: ADL  Goal Description: Pt will be able to open jar without pain.  Rationale: In order to maximize safety and independence with ADL/IADLs  Target Date: 06/18/24      Frequency of Treatment: 1x/wk  Duration of Treatment: 12     Recommended Referrals to Other Professionals:   Education Assessment: Learner/Method: Patient     Risks and benefits of evaluation/treatment have been explained.   Patient/Family/caregiver agrees with Plan of Care.     Evaluation Time:    OT Eval, Low Complexity Minutes (58449): 25        Signing Clinician: Kavya Hamlin, OT

## 2024-04-09 ENCOUNTER — THERAPY VISIT (OUTPATIENT)
Dept: OCCUPATIONAL THERAPY | Facility: CLINIC | Age: 59
End: 2024-04-09
Attending: ORTHOPAEDIC SURGERY
Payer: COMMERCIAL

## 2024-04-09 DIAGNOSIS — M25.532 LEFT WRIST PAIN: ICD-10-CM

## 2024-04-09 DIAGNOSIS — Z47.89 AFTERCARE FOLLOWING SURGERY OF THE MUSCULOSKELETAL SYSTEM: Primary | ICD-10-CM

## 2024-04-09 PROCEDURE — 97110 THERAPEUTIC EXERCISES: CPT | Mod: GO | Performed by: OCCUPATIONAL THERAPIST

## 2024-04-09 PROCEDURE — 97140 MANUAL THERAPY 1/> REGIONS: CPT | Mod: GO | Performed by: OCCUPATIONAL THERAPIST

## 2024-04-18 ENCOUNTER — THERAPY VISIT (OUTPATIENT)
Dept: OCCUPATIONAL THERAPY | Facility: CLINIC | Age: 59
End: 2024-04-18
Attending: ORTHOPAEDIC SURGERY
Payer: COMMERCIAL

## 2024-04-18 DIAGNOSIS — Z47.89 AFTERCARE FOLLOWING SURGERY OF THE MUSCULOSKELETAL SYSTEM: Primary | ICD-10-CM

## 2024-04-18 DIAGNOSIS — M25.532 LEFT WRIST PAIN: ICD-10-CM

## 2024-04-18 PROCEDURE — 97110 THERAPEUTIC EXERCISES: CPT | Mod: GO | Performed by: OCCUPATIONAL THERAPIST

## 2024-04-18 PROCEDURE — 97140 MANUAL THERAPY 1/> REGIONS: CPT | Mod: GO | Performed by: OCCUPATIONAL THERAPIST

## 2024-04-23 ENCOUNTER — THERAPY VISIT (OUTPATIENT)
Dept: OCCUPATIONAL THERAPY | Facility: CLINIC | Age: 59
End: 2024-04-23
Attending: ORTHOPAEDIC SURGERY
Payer: COMMERCIAL

## 2024-04-23 DIAGNOSIS — M25.532 LEFT WRIST PAIN: ICD-10-CM

## 2024-04-23 DIAGNOSIS — Z47.89 AFTERCARE FOLLOWING SURGERY OF THE MUSCULOSKELETAL SYSTEM: Primary | ICD-10-CM

## 2024-04-23 PROCEDURE — 97140 MANUAL THERAPY 1/> REGIONS: CPT | Mod: GO | Performed by: OCCUPATIONAL THERAPIST

## 2024-04-23 PROCEDURE — 97110 THERAPEUTIC EXERCISES: CPT | Mod: GO | Performed by: OCCUPATIONAL THERAPIST

## 2024-04-30 ENCOUNTER — THERAPY VISIT (OUTPATIENT)
Dept: OCCUPATIONAL THERAPY | Facility: CLINIC | Age: 59
End: 2024-04-30
Attending: ORTHOPAEDIC SURGERY
Payer: COMMERCIAL

## 2024-04-30 DIAGNOSIS — M25.532 LEFT WRIST PAIN: ICD-10-CM

## 2024-04-30 DIAGNOSIS — Z47.89 AFTERCARE FOLLOWING SURGERY OF THE MUSCULOSKELETAL SYSTEM: Primary | ICD-10-CM

## 2024-04-30 PROCEDURE — 97110 THERAPEUTIC EXERCISES: CPT | Mod: GO | Performed by: OCCUPATIONAL THERAPIST

## 2024-04-30 PROCEDURE — 97140 MANUAL THERAPY 1/> REGIONS: CPT | Mod: GO | Performed by: OCCUPATIONAL THERAPIST

## 2024-05-07 ENCOUNTER — THERAPY VISIT (OUTPATIENT)
Dept: OCCUPATIONAL THERAPY | Facility: CLINIC | Age: 59
End: 2024-05-07
Attending: ORTHOPAEDIC SURGERY
Payer: COMMERCIAL

## 2024-05-07 DIAGNOSIS — Z47.89 AFTERCARE FOLLOWING SURGERY OF THE MUSCULOSKELETAL SYSTEM: Primary | ICD-10-CM

## 2024-05-07 DIAGNOSIS — M25.532 LEFT WRIST PAIN: ICD-10-CM

## 2024-05-07 PROCEDURE — 97140 MANUAL THERAPY 1/> REGIONS: CPT | Mod: GO | Performed by: OCCUPATIONAL THERAPIST

## 2024-05-07 PROCEDURE — 97110 THERAPEUTIC EXERCISES: CPT | Mod: GO | Performed by: OCCUPATIONAL THERAPIST

## 2024-05-14 ENCOUNTER — THERAPY VISIT (OUTPATIENT)
Dept: OCCUPATIONAL THERAPY | Facility: CLINIC | Age: 59
End: 2024-05-14
Attending: ORTHOPAEDIC SURGERY
Payer: COMMERCIAL

## 2024-05-14 DIAGNOSIS — M25.532 LEFT WRIST PAIN: ICD-10-CM

## 2024-05-14 DIAGNOSIS — Z47.89 AFTERCARE FOLLOWING SURGERY OF THE MUSCULOSKELETAL SYSTEM: Primary | ICD-10-CM

## 2024-05-14 PROCEDURE — 97140 MANUAL THERAPY 1/> REGIONS: CPT | Mod: GO | Performed by: OCCUPATIONAL THERAPIST

## 2024-05-14 PROCEDURE — 97110 THERAPEUTIC EXERCISES: CPT | Mod: GO | Performed by: OCCUPATIONAL THERAPIST

## 2024-05-16 ENCOUNTER — THERAPY VISIT (OUTPATIENT)
Dept: OCCUPATIONAL THERAPY | Facility: CLINIC | Age: 59
End: 2024-05-16
Attending: ORTHOPAEDIC SURGERY
Payer: COMMERCIAL

## 2024-05-16 DIAGNOSIS — Z47.89 AFTERCARE FOLLOWING SURGERY OF THE MUSCULOSKELETAL SYSTEM: Primary | ICD-10-CM

## 2024-05-16 DIAGNOSIS — M25.532 LEFT WRIST PAIN: ICD-10-CM

## 2024-05-16 PROCEDURE — 97140 MANUAL THERAPY 1/> REGIONS: CPT | Mod: GO | Performed by: OCCUPATIONAL THERAPIST

## 2024-05-16 PROCEDURE — 97110 THERAPEUTIC EXERCISES: CPT | Mod: GO | Performed by: OCCUPATIONAL THERAPIST

## 2024-05-21 ENCOUNTER — THERAPY VISIT (OUTPATIENT)
Dept: OCCUPATIONAL THERAPY | Facility: CLINIC | Age: 59
End: 2024-05-21
Attending: ORTHOPAEDIC SURGERY
Payer: COMMERCIAL

## 2024-05-21 DIAGNOSIS — Z47.89 AFTERCARE FOLLOWING SURGERY OF THE MUSCULOSKELETAL SYSTEM: Primary | ICD-10-CM

## 2024-05-21 DIAGNOSIS — M25.532 LEFT WRIST PAIN: ICD-10-CM

## 2024-05-21 PROCEDURE — 97110 THERAPEUTIC EXERCISES: CPT | Mod: GO | Performed by: OCCUPATIONAL THERAPIST

## 2024-05-21 PROCEDURE — 97140 MANUAL THERAPY 1/> REGIONS: CPT | Mod: GO | Performed by: OCCUPATIONAL THERAPIST

## 2024-05-21 NOTE — PROGRESS NOTES
PLAN  Continue therapy per current plan of care.    Beginning/End Dates of Progress Note Reporting Period:  3/26/24 to 05/21/2024    Referring Provider:  Santy Davison       05/21/24 0500   Appointment Info   Treating Provider Kavya Hamlin, OTR/L, CHT   Total/Authorized Visits 12   Visits Used 9   Medical Diagnosis Aftercare following surgery of the musculoskeletal system.   OT Tx Diagnosis wrist stiffness. limited AROM. Pain.   Progress Note/Certification   Onset of Illness/Injury or Date of Surgery 03/22/24   Therapy Frequency 1x/wk   Predicted Duration 12   Progress Note Due Date 07/02/24   OT Goal 1   Goal Identifier ADL   Goal Description Pt will be able to open jar without pain.   Rationale In order to maximize safety and independence with ADL/IADLs   Target Date 06/18/24   Subjective Report   Subjective Report It just hurts. It keeps me from doing things and all the pain is on the pinkie side.   Objective Measures   Objective Measures Hand Obj Measures   Hand Objective Measures ROM;Strength   ROM Forearm ROM   Strength    Forearm ROM   Supination 65   Pronation  55   Wrist ROM    Extension 70   Flexion 50   Radial Deviation (RD) 10   Ulnar Deviation (UD) 15    (measured in pounds)    Average Strength 20   Treatment Interventions (OT)   Interventions Therapeutic Procedure/Exercise;Manual Therapy   Therapeutic Procedure/Exercise   Therapeutic Procedure: strength, endurance, ROM, flexibillity minutes (62775) 30   Therapeutic Procedures Ther Proc 2;Ther Proc 3;Ther Proc 4   Ther Proc 1 AA/AROM in all directions   Ther Proc 1 - Details E, F, R, U, S, P   Ther Proc 2 Wrist isometrics and isotonics   Ther Proc 2 - Details WE, RD   Ther Proc 3 PROM   Ther Proc 3 - Details wrist E/F, RU   Ther Proc 4 flexbar   Ther Proc 4 - Details happy/sad rotational movements   Skilled Intervention graded motion for safety of surgical healing   Patient Response/Progress well   Manual Therapy   Manual Therapy  Minutes (81573) 15   Manual Therapy Manual Therapy 2   Manual Therapy 1 STM, MEM and scar massage   Manual Therapy 1 - Details extensive time spent with scar tissue remodeling, tissue elongation   Manual Therapy 2 JMs   Manual Therapy 2 - Details carpals, DRUJ   Skilled Intervention anatomical knowledge with would healing to appropriately begin tissue management   Education   Learner/Method Patient   Plan   Home program PTRx   Plan for next session AROM, scar massage, edema control   Comments   Comments Pt with ongoing high pain levels all at the TFCC region that radiates proximally. Focusing strength on DRUJ stability and offloading ulnar side of the wrist. Scar tissue is becoming more and more mobile. Flexion conts to be limited, though nearly 50* this date but a bony end range felt. Cont to focus on strength and stability.   Total Session Time   Timed Code Treatment Minutes 45   Total Treatment Time (sum of timed and untimed services) 45

## 2024-05-23 ENCOUNTER — THERAPY VISIT (OUTPATIENT)
Dept: OCCUPATIONAL THERAPY | Facility: CLINIC | Age: 59
End: 2024-05-23
Attending: ORTHOPAEDIC SURGERY
Payer: COMMERCIAL

## 2024-05-23 DIAGNOSIS — M25.532 LEFT WRIST PAIN: ICD-10-CM

## 2024-05-23 DIAGNOSIS — Z47.89 AFTERCARE FOLLOWING SURGERY OF THE MUSCULOSKELETAL SYSTEM: Primary | ICD-10-CM

## 2024-05-23 PROCEDURE — 97110 THERAPEUTIC EXERCISES: CPT | Mod: GO | Performed by: OCCUPATIONAL THERAPIST

## 2024-05-23 PROCEDURE — 97140 MANUAL THERAPY 1/> REGIONS: CPT | Mod: GO,59 | Performed by: OCCUPATIONAL THERAPIST

## 2024-05-23 PROCEDURE — 97035 APP MDLTY 1+ULTRASOUND EA 15: CPT | Mod: GO | Performed by: OCCUPATIONAL THERAPIST

## 2024-05-23 PROCEDURE — 97763 ORTHC/PROSTC MGMT SBSQ ENC: CPT | Mod: GO | Performed by: OCCUPATIONAL THERAPIST

## 2024-05-30 ENCOUNTER — THERAPY VISIT (OUTPATIENT)
Dept: OCCUPATIONAL THERAPY | Facility: CLINIC | Age: 59
End: 2024-05-30
Attending: ORTHOPAEDIC SURGERY
Payer: COMMERCIAL

## 2024-05-30 DIAGNOSIS — M25.532 LEFT WRIST PAIN: ICD-10-CM

## 2024-05-30 DIAGNOSIS — Z47.89 AFTERCARE FOLLOWING SURGERY OF THE MUSCULOSKELETAL SYSTEM: Primary | ICD-10-CM

## 2024-05-30 PROCEDURE — 97035 APP MDLTY 1+ULTRASOUND EA 15: CPT | Mod: GO | Performed by: OCCUPATIONAL THERAPIST

## 2024-05-30 PROCEDURE — 97140 MANUAL THERAPY 1/> REGIONS: CPT | Mod: GO | Performed by: OCCUPATIONAL THERAPIST

## 2024-05-30 PROCEDURE — 97110 THERAPEUTIC EXERCISES: CPT | Mod: GO | Performed by: OCCUPATIONAL THERAPIST

## 2024-05-31 ENCOUNTER — TELEPHONE (OUTPATIENT)
Dept: FAMILY MEDICINE | Facility: CLINIC | Age: 59
End: 2024-05-31
Payer: COMMERCIAL

## 2024-05-31 NOTE — TELEPHONE ENCOUNTER
Reason for Call:  Appointment Request    Patient requesting this type of appt: Procedure: Colposcopy    Requested provider:  Anyone at [facility]    Reason patient unable to be scheduled: Needs to be scheduled by clinic    When does patient want to be seen/preferred time:  When able    Comments: PT would like to schedule her colposcopy.     Okay to leave a detailed message?: Yes at  [phone]    Call taken on 5/31/2024 at 2:43 PM by Ailyn Olivera

## 2024-06-04 ENCOUNTER — THERAPY VISIT (OUTPATIENT)
Dept: OCCUPATIONAL THERAPY | Facility: CLINIC | Age: 59
End: 2024-06-04
Attending: ORTHOPAEDIC SURGERY
Payer: COMMERCIAL

## 2024-06-04 DIAGNOSIS — Z47.89 AFTERCARE FOLLOWING SURGERY OF THE MUSCULOSKELETAL SYSTEM: Primary | ICD-10-CM

## 2024-06-04 DIAGNOSIS — M25.532 LEFT WRIST PAIN: ICD-10-CM

## 2024-06-04 PROCEDURE — 97112 NEUROMUSCULAR REEDUCATION: CPT | Mod: GO | Performed by: OCCUPATIONAL THERAPIST

## 2024-06-04 PROCEDURE — 97110 THERAPEUTIC EXERCISES: CPT | Mod: GO | Performed by: OCCUPATIONAL THERAPIST

## 2024-06-04 PROCEDURE — 97140 MANUAL THERAPY 1/> REGIONS: CPT | Mod: GO | Performed by: OCCUPATIONAL THERAPIST

## 2024-06-11 ENCOUNTER — THERAPY VISIT (OUTPATIENT)
Dept: OCCUPATIONAL THERAPY | Facility: CLINIC | Age: 59
End: 2024-06-11
Attending: ORTHOPAEDIC SURGERY
Payer: COMMERCIAL

## 2024-06-11 DIAGNOSIS — M25.532 LEFT WRIST PAIN: ICD-10-CM

## 2024-06-11 DIAGNOSIS — Z47.89 AFTERCARE FOLLOWING SURGERY OF THE MUSCULOSKELETAL SYSTEM: Primary | ICD-10-CM

## 2024-06-11 PROCEDURE — 97035 APP MDLTY 1+ULTRASOUND EA 15: CPT | Mod: GO | Performed by: OCCUPATIONAL THERAPIST

## 2024-06-11 PROCEDURE — 97110 THERAPEUTIC EXERCISES: CPT | Mod: GO | Performed by: OCCUPATIONAL THERAPIST

## 2024-06-11 PROCEDURE — 97140 MANUAL THERAPY 1/> REGIONS: CPT | Mod: GO | Performed by: OCCUPATIONAL THERAPIST

## 2024-06-11 NOTE — PROGRESS NOTES
DISCHARGE  Reason for Discharge: Patient has met all goals.  Patient chooses to discontinue therapy.      Discharge Plan: Patient to continue home program.    Referring Provider:  Rosendo GTZ 63/80       06/11/24 0500   Appointment Info   Treating Provider Kavya Hamlin, OTR/L, CHT   Total/Authorized Visits 12   Visits Used 13   Medical Diagnosis Aftercare following surgery of the musculoskeletal system.   OT Tx Diagnosis wrist stiffness. limited AROM. Pain.   Progress Note/Certification   Onset of Illness/Injury or Date of Surgery 03/22/24   Therapy Frequency 1x/wk   Predicted Duration 12   Progress Note Due Date 07/02/24   Progress Note Completed Date 05/21/24   OT Goal 1   Goal Identifier ADL   Goal Description Pt will be able to open jar without pain.   Rationale In order to maximize safety and independence with ADL/IADLs   Goal Progress met   Target Date 06/18/24   Date Met 06/11/24   Subjective Report   Subjective Report I had time over the weekend that I didn't even know I had an injury.   Objective Measures   Objective Measures Hand Obj Measures   Hand Objective Measures ROM;Strength   ROM Forearm ROM   Strength    Forearm ROM   Supination 70   Pronation  55   Wrist ROM    Extension 70  (same)   Flexion 45   Radial Deviation (RD) 15   Ulnar Deviation (UD) 25    (measured in pounds)    Average Strength 25   OT Modalities   OT Modalities Ultrasound    Ultrasound   Ultrasound, Minutes (16933) 8 Minutes   Treatment Detail 3.3mhz, 1.2w/c2, 100%  (ulnar wrist)   Patient Response/Progress well   Treatment Interventions (OT)   Interventions Therapeutic Procedure/Exercise;Manual Therapy;Orthotics;Neuromuscular Re-education   Neuromuscular Re-education   Neuro Re-ed 1 Proprioception   Neuro Re-ed 1 - Details TB and wheel, webbing w/ pusing, gripping and bouncing   Skilled Intervention graded input into the wrist/hand to tolerate loading   Patient Response/Progress well   Therapeutic  Procedure/Exercise   Therapeutic Procedure: strength, endurance, ROM, flexibillity minutes (55303) 20   Therapeutic Procedures Ther Proc 2;Ther Proc 3;Ther Proc 4;Ther Proc 5   Ther Proc 1 AA/AROM in all directions   Ther Proc 1 - Details E, F, R, U, S, P   Ther Proc 2 Wrist isometrics   Ther Proc 2 - Details WE, RD  (in neutral FA positioning. 5 sec holds 5-7 reps)   Ther Proc 3 2# theraball   Ther Proc 3 - Details wrist E/F, RU   Ther Proc 4 flexbar   Ther Proc 4 - Details happy/sad rotational movements   Skilled Intervention graded motion for safety of surgical healing   Patient Response/Progress well   Ther Proc 5 thumb stabiltiy   Ther Proc 5 - Details 1DI and EPB   Manual Therapy   Manual Therapy Minutes (51131) 15   Manual Therapy Manual Therapy 2   Manual Therapy 1 STM, MEM and scar massage   Manual Therapy 1 - Details extensive time spent with scar tissue remodeling, tissue elongation   Manual Therapy 2 ktape   Manual Therapy 2 - Details stability and space opening   Skilled Intervention anatomical knowledge with would healing to appropriately begin tissue management   Education   Learner/Method Patient   Plan   Home program PTRx   Updates to plan of care d/c   Comments   Comments Ongoing improvements in pain control as well as strength throughout the L wrist. She conts to have most deficits w/ prolonged pronation/supination tasks. As well as ECU discomfort. However, at this time pt feels ready to d/c to HEP and understands to call with any questions or concerns. This therapist agrees with plan and went thru [facility] for d/c.   Total Session Time   Timed Code Treatment Minutes 43   Total Treatment Time (sum of timed and untimed services) 43

## 2024-07-03 ENCOUNTER — HOSPITAL ENCOUNTER (EMERGENCY)
Facility: CLINIC | Age: 59
Discharge: LEFT BEFORE DISCHARGE | End: 2024-07-04
Attending: FAMILY MEDICINE | Admitting: FAMILY MEDICINE
Payer: COMMERCIAL

## 2024-07-03 VITALS
TEMPERATURE: 98.4 F | RESPIRATION RATE: 18 BRPM | SYSTOLIC BLOOD PRESSURE: 152 MMHG | WEIGHT: 125 LBS | HEART RATE: 85 BPM | DIASTOLIC BLOOD PRESSURE: 98 MMHG | OXYGEN SATURATION: 100 % | BODY MASS INDEX: 22.14 KG/M2

## 2024-07-03 PROCEDURE — 250N000011 HC RX IP 250 OP 636: Performed by: FAMILY MEDICINE

## 2024-07-03 PROCEDURE — 99281 EMR DPT VST MAYX REQ PHY/QHP: CPT

## 2024-07-03 RX ORDER — ONDANSETRON 2 MG/ML
4 INJECTION INTRAMUSCULAR; INTRAVENOUS ONCE
Status: DISCONTINUED | OUTPATIENT
Start: 2024-07-03 | End: 2024-07-04 | Stop reason: HOSPADM

## 2024-07-03 RX ORDER — ONDANSETRON 4 MG/1
4 TABLET, ORALLY DISINTEGRATING ORAL ONCE
Status: COMPLETED | OUTPATIENT
Start: 2024-07-03 | End: 2024-07-03

## 2024-07-03 RX ADMIN — ONDANSETRON 4 MG: 4 TABLET, ORALLY DISINTEGRATING ORAL at 23:48

## 2024-07-03 ASSESSMENT — COLUMBIA-SUICIDE SEVERITY RATING SCALE - C-SSRS
2. HAVE YOU ACTUALLY HAD ANY THOUGHTS OF KILLING YOURSELF IN THE PAST MONTH?: NO
6. HAVE YOU EVER DONE ANYTHING, STARTED TO DO ANYTHING, OR PREPARED TO DO ANYTHING TO END YOUR LIFE?: NO
1. IN THE PAST MONTH, HAVE YOU WISHED YOU WERE DEAD OR WISHED YOU COULD GO TO SLEEP AND NOT WAKE UP?: NO

## 2024-07-04 NOTE — ED TRIAGE NOTES
R upper abd pain wraps around R rib to back onset tonight and pain is severe causing some sob.      Triage Assessment (Adult)       Row Name 07/03/24 1030          Triage Assessment    Airway WDL WDL        Respiratory WDL    Respiratory WDL WDL        Cardiac WDL    Cardiac WDL WDL        Peripheral/Neurovascular WDL    Peripheral Neurovascular WDL WDL

## 2024-07-04 NOTE — ED NOTES
RN offered IV in triage to administer IV zofran and fluids per standing orders. Pt is declining at this time will take PO zofran ODT.

## 2024-07-05 ENCOUNTER — NURSE TRIAGE (OUTPATIENT)
Dept: FAMILY MEDICINE | Facility: CLINIC | Age: 59
End: 2024-07-05
Payer: COMMERCIAL

## 2024-07-05 NOTE — TELEPHONE ENCOUNTER
RN Triage    Patient Contact    Attempt # 1    Was call answered?  No.  Left message on voicemail with information to call me back.    Odalys Lynn RN on 7/5/2024 at 3:44 PM

## 2024-07-05 NOTE — TELEPHONE ENCOUNTER
Patient seen 7/3/24 for abdominal pain, rib pain. Please call for follow up.    MANAS McconnellN, RN

## 2024-07-08 NOTE — TELEPHONE ENCOUNTER
RN Triage    Patient Contact    Attempt # 2    Was call answered?  No.  Left message on voicemail with information to call me back.    Odalys Lynn RN on 7/8/2024 at 12:56 PM

## 2024-07-09 ENCOUNTER — APPOINTMENT (OUTPATIENT)
Dept: CT IMAGING | Facility: CLINIC | Age: 59
End: 2024-07-09
Attending: EMERGENCY MEDICINE
Payer: COMMERCIAL

## 2024-07-09 ENCOUNTER — HOSPITAL ENCOUNTER (EMERGENCY)
Facility: CLINIC | Age: 59
Discharge: HOME | End: 2024-07-10
Attending: EMERGENCY MEDICINE | Admitting: EMERGENCY MEDICINE
Payer: COMMERCIAL

## 2024-07-09 DIAGNOSIS — K57.20 DIVERTICULITIS OF COLON WITH PERFORATION: ICD-10-CM

## 2024-07-09 DIAGNOSIS — K76.9 LIVER LESION: ICD-10-CM

## 2024-07-09 DIAGNOSIS — R10.84 GENERALIZED ABDOMINAL PAIN: ICD-10-CM

## 2024-07-09 DIAGNOSIS — R31.9 HEMATURIA, UNSPECIFIED TYPE: ICD-10-CM

## 2024-07-09 LAB
ALBUMIN SERPL BCG-MCNC: 4.3 G/DL (ref 3.5–5.2)
ALBUMIN UR-MCNC: NEGATIVE MG/DL
ALP SERPL-CCNC: 85 U/L (ref 40–150)
ALT SERPL W P-5'-P-CCNC: 17 U/L (ref 0–50)
ANION GAP SERPL CALCULATED.3IONS-SCNC: 16 MMOL/L (ref 7–15)
APPEARANCE UR: CLEAR
AST SERPL W P-5'-P-CCNC: 24 U/L (ref 0–45)
BASOPHILS # BLD AUTO: 0.1 10E3/UL (ref 0–0.2)
BASOPHILS NFR BLD AUTO: 0 %
BILIRUB SERPL-MCNC: 1.4 MG/DL
BILIRUB UR QL STRIP: NEGATIVE
BUN SERPL-MCNC: 8.8 MG/DL (ref 8–23)
CALCIUM SERPL-MCNC: 9.4 MG/DL (ref 8.8–10.2)
CHLORIDE SERPL-SCNC: 97 MMOL/L (ref 98–107)
COLOR UR AUTO: YELLOW
CREAT SERPL-MCNC: 0.66 MG/DL (ref 0.51–0.95)
DEPRECATED HCO3 PLAS-SCNC: 24 MMOL/L (ref 22–29)
EGFRCR SERPLBLD CKD-EPI 2021: >90 ML/MIN/1.73M2
EOSINOPHIL # BLD AUTO: 0.1 10E3/UL (ref 0–0.7)
EOSINOPHIL NFR BLD AUTO: 1 %
ERYTHROCYTE [DISTWIDTH] IN BLOOD BY AUTOMATED COUNT: 13.4 % (ref 10–15)
GLUCOSE SERPL-MCNC: 91 MG/DL (ref 70–99)
GLUCOSE UR STRIP-MCNC: NEGATIVE MG/DL
HCT VFR BLD AUTO: 39.4 % (ref 35–47)
HGB BLD-MCNC: 13.1 G/DL (ref 11.7–15.7)
HGB UR QL STRIP: ABNORMAL
IMM GRANULOCYTES # BLD: 0.1 10E3/UL
IMM GRANULOCYTES NFR BLD: 0 %
KETONES UR STRIP-MCNC: 80 MG/DL
LEUKOCYTE ESTERASE UR QL STRIP: NEGATIVE
LIPASE SERPL-CCNC: 18 U/L (ref 13–60)
LYMPHOCYTES # BLD AUTO: 1.1 10E3/UL (ref 0.8–5.3)
LYMPHOCYTES NFR BLD AUTO: 8 %
MCH RBC QN AUTO: 31.5 PG (ref 26.5–33)
MCHC RBC AUTO-ENTMCNC: 33.2 G/DL (ref 31.5–36.5)
MCV RBC AUTO: 95 FL (ref 78–100)
MONOCYTES # BLD AUTO: 1.4 10E3/UL (ref 0–1.3)
MONOCYTES NFR BLD AUTO: 10 %
MUCOUS THREADS #/AREA URNS LPF: PRESENT /LPF
NEUTROPHILS # BLD AUTO: 11.4 10E3/UL (ref 1.6–8.3)
NEUTROPHILS NFR BLD AUTO: 81 %
NITRATE UR QL: NEGATIVE
NRBC # BLD AUTO: 0 10E3/UL
NRBC BLD AUTO-RTO: 0 /100
PH UR STRIP: 6 [PH] (ref 5–7)
PLATELET # BLD AUTO: 207 10E3/UL (ref 150–450)
POTASSIUM SERPL-SCNC: 4.3 MMOL/L (ref 3.4–5.3)
PROT SERPL-MCNC: 7.1 G/DL (ref 6.4–8.3)
RBC # BLD AUTO: 4.16 10E6/UL (ref 3.8–5.2)
RBC URINE: 12 /HPF
SODIUM SERPL-SCNC: 137 MMOL/L (ref 135–145)
SP GR UR STRIP: 1.01 (ref 1–1.03)
SQUAMOUS EPITHELIAL: <1 /HPF
UROBILINOGEN UR STRIP-MCNC: NORMAL MG/DL
WBC # BLD AUTO: 14.1 10E3/UL (ref 4–11)
WBC URINE: 7 /HPF

## 2024-07-09 PROCEDURE — 83690 ASSAY OF LIPASE: CPT | Performed by: EMERGENCY MEDICINE

## 2024-07-09 PROCEDURE — 258N000003 HC RX IP 258 OP 636: Performed by: EMERGENCY MEDICINE

## 2024-07-09 PROCEDURE — 36415 COLL VENOUS BLD VENIPUNCTURE: CPT | Performed by: EMERGENCY MEDICINE

## 2024-07-09 PROCEDURE — 96375 TX/PRO/DX INJ NEW DRUG ADDON: CPT

## 2024-07-09 PROCEDURE — 80053 COMPREHEN METABOLIC PANEL: CPT | Performed by: EMERGENCY MEDICINE

## 2024-07-09 PROCEDURE — 99285 EMERGENCY DEPT VISIT HI MDM: CPT | Mod: 25

## 2024-07-09 PROCEDURE — 81001 URINALYSIS AUTO W/SCOPE: CPT | Performed by: EMERGENCY MEDICINE

## 2024-07-09 PROCEDURE — 96365 THER/PROPH/DIAG IV INF INIT: CPT | Mod: 59

## 2024-07-09 PROCEDURE — 250N000011 HC RX IP 250 OP 636: Performed by: EMERGENCY MEDICINE

## 2024-07-09 PROCEDURE — 87086 URINE CULTURE/COLONY COUNT: CPT | Performed by: STUDENT IN AN ORGANIZED HEALTH CARE EDUCATION/TRAINING PROGRAM

## 2024-07-09 PROCEDURE — 96361 HYDRATE IV INFUSION ADD-ON: CPT

## 2024-07-09 PROCEDURE — 85025 COMPLETE CBC W/AUTO DIFF WBC: CPT | Performed by: EMERGENCY MEDICINE

## 2024-07-09 PROCEDURE — 74177 CT ABD & PELVIS W/CONTRAST: CPT

## 2024-07-09 PROCEDURE — 250N000009 HC RX 250: Performed by: EMERGENCY MEDICINE

## 2024-07-09 PROCEDURE — 99284 EMERGENCY DEPT VISIT MOD MDM: CPT | Performed by: EMERGENCY MEDICINE

## 2024-07-09 RX ORDER — IOPAMIDOL 755 MG/ML
500 INJECTION, SOLUTION INTRAVASCULAR ONCE
Status: COMPLETED | OUTPATIENT
Start: 2024-07-09 | End: 2024-07-09

## 2024-07-09 RX ORDER — KETOROLAC TROMETHAMINE 15 MG/ML
15 INJECTION, SOLUTION INTRAMUSCULAR; INTRAVENOUS ONCE
Status: COMPLETED | OUTPATIENT
Start: 2024-07-09 | End: 2024-07-09

## 2024-07-09 RX ADMIN — IOPAMIDOL 62 ML: 755 INJECTION, SOLUTION INTRAVENOUS at 23:10

## 2024-07-09 RX ADMIN — KETOROLAC TROMETHAMINE 15 MG: 15 INJECTION, SOLUTION INTRAMUSCULAR; INTRAVENOUS at 22:50

## 2024-07-09 RX ADMIN — SODIUM CHLORIDE 60 ML: 9 INJECTION, SOLUTION INTRAVENOUS at 23:10

## 2024-07-09 RX ADMIN — SODIUM CHLORIDE 1000 ML: 9 INJECTION, SOLUTION INTRAVENOUS at 22:49

## 2024-07-09 ASSESSMENT — COLUMBIA-SUICIDE SEVERITY RATING SCALE - C-SSRS
6. HAVE YOU EVER DONE ANYTHING, STARTED TO DO ANYTHING, OR PREPARED TO DO ANYTHING TO END YOUR LIFE?: NO
1. IN THE PAST MONTH, HAVE YOU WISHED YOU WERE DEAD OR WISHED YOU COULD GO TO SLEEP AND NOT WAKE UP?: NO
2. HAVE YOU ACTUALLY HAD ANY THOUGHTS OF KILLING YOURSELF IN THE PAST MONTH?: NO

## 2024-07-09 ASSESSMENT — ACTIVITIES OF DAILY LIVING (ADL): ADLS_ACUITY_SCORE: 35

## 2024-07-09 NOTE — Clinical Note
Lorraine Hunter was seen and treated in our emergency department on 7/9/2024.  She may return to work on 07/13/2024.       If you have any questions or concerns, please don't hesitate to call.      Anthony Duenas MD

## 2024-07-09 NOTE — TELEPHONE ENCOUNTER
"  Did not completed ED follow up as patient was not actually seen in the ED    Nurse Triage SBAR    Is this a 2nd Level Triage? NO    Situation: patient returning call for ED follow up    Background: patient to ED on 7/3. Was not actually seen. Right side abdominal pain started at 11pm and stretches around to the right side of her back. She was given zofran due to being nauseous from the pain; this was helpful. 4th-7th the abdominal pain was still there but tolerable. Since the 8th the pain has been much worse and patient is afraid to eat. Has only drank 16ounces of water each day for the past two days. Last BM last night \"normal\" for her. No blood noted or black tarry stool. Patient notes yesterday her urine became darker. She went to Doctor at [facility]. They said she was noted to have blood in her urine and said she needed a CT. Patient believed they ordered it. Not seen in chart.     Assessment: severe abdominal pain middle of abdomen on the right side and going around the back. No fever. Dark urine; possible blood in urine. Not able to eat or drink    Protocol Recommended Disposition:   Call ADS/Go to ED/UCC Now (Or To Office with PCP Approval)    Recommendation: Patient will go to ED to be assessed        Does the patient meet one of the following criteria for ADS visit consideration? No patient did not want to drive that far  Reason for Disposition   MILD TO MODERATE constant pain lasting > 2 hours    Additional Information   Negative: Passed out (i.e., fainted, collapsed and was not responding)   Negative: Shock suspected (e.g., cold/pale/clammy skin, too weak to stand, low BP, rapid pulse)   Negative: Sounds like a life-threatening emergency to the triager   Negative: Followed an abdomen (stomach) injury   Negative: Chest pain   Negative: Abdominal pain and pregnant < 20 weeks   Negative: Abdominal pain and pregnant 20 or more weeks   Negative: Pain is mainly in upper abdomen (if needed ask: 'is it " mainly above the belly button?')   Negative: Abdomen bloating or swelling are main symptoms   Negative: SEVERE abdominal pain (e.g., excruciating)   Negative: Vomiting red blood or black (coffee ground) material   Negative: Blood in bowel movements  (Exception: Blood on surface of BM with constipation.)   Negative: Black or tarry bowel movements  (Exception: Chronic-unchanged black-grey BMs AND is taking iron pills or Pepto-Bismol.)    Protocols used: Abdominal Pain - Female-A-[place]

## 2024-07-10 VITALS
RESPIRATION RATE: 16 BRPM | SYSTOLIC BLOOD PRESSURE: 122 MMHG | WEIGHT: 125 LBS | BODY MASS INDEX: 22.14 KG/M2 | TEMPERATURE: 98.1 F | DIASTOLIC BLOOD PRESSURE: 80 MMHG | OXYGEN SATURATION: 96 % | HEART RATE: 84 BPM

## 2024-07-10 PROCEDURE — 250N000011 HC RX IP 250 OP 636: Performed by: STUDENT IN AN ORGANIZED HEALTH CARE EDUCATION/TRAINING PROGRAM

## 2024-07-10 RX ORDER — ONDANSETRON 4 MG/1
4 TABLET, ORALLY DISINTEGRATING ORAL EVERY 8 HOURS PRN
Qty: 10 TABLET | Refills: 0 | Status: SHIPPED | OUTPATIENT
Start: 2024-07-10

## 2024-07-10 RX ORDER — OXYCODONE HYDROCHLORIDE 5 MG/1
5 TABLET ORAL EVERY 6 HOURS PRN
Qty: 6 TABLET | Refills: 0 | Status: SHIPPED | OUTPATIENT
Start: 2024-07-10

## 2024-07-10 RX ORDER — PIPERACILLIN SODIUM, TAZOBACTAM SODIUM 3; .375 G/15ML; G/15ML
3.38 INJECTION, POWDER, LYOPHILIZED, FOR SOLUTION INTRAVENOUS ONCE
Status: COMPLETED | OUTPATIENT
Start: 2024-07-10 | End: 2024-07-10

## 2024-07-10 RX ADMIN — PIPERACILLIN AND TAZOBACTAM 3.38 G: 3; .375 INJECTION, POWDER, FOR SOLUTION INTRAVENOUS at 00:16

## 2024-07-10 ASSESSMENT — ACTIVITIES OF DAILY LIVING (ADL): ADLS_ACUITY_SCORE: 35

## 2024-07-10 NOTE — ED PROVIDER NOTES
History     Chief Complaint   Patient presents with    Abdominal Pain     HPI  Lorraine Hunter is a 58 year old female who presents to the emergency department secondary to diffuse abdominal pain.  This started on 3 July and she was seen in the clinic at [facility] and was found to have red blood cells in her urine.  The pain at that time was more located on the right side of her abdomen.  She got Zofran ODT in the lobby and was feeling better and there was a long wait so she decided to go home.  She felt better for couple of days and then the pain came back fairly significantly and has been constant and more diffuse and she feels bloated.  She has not been able to eat anything over the last couple of days.  She has not had a fever or vomiting.  Last bowel movement was yesterday but was small.  She has not had any blood in her stool.  She has never had a kidney stone in the past and never had abdominal pain like this previously.  She has been taking ibuprofen 800 mg which seems to help.  She does not have any nausea at this time.  She has not had any vaginal bleeding.    Allergies:  No Known Allergies    Problem List:    Patient Active Problem List    Diagnosis Date Noted    History of cold sores 09/03/2014     Priority: Medium    Adenomatous polyp of colon 07/01/2014     Priority: Medium     q 5 yr colonoscopys      CARDIOVASCULAR SCREENING; LDL GOAL LESS THAN 160 12/20/2010     Priority: Medium    Diffuse cystic mastopathy 08/22/2007     Priority: Medium    Cervical high risk HPV (human papillomavirus) test positive 08/22/2007     Priority: Medium     8/22/07 [facility] pap, + HR HPV 53.  9/28/07 [place]- Negative  2008, 2009, 2012 NIL paps, Neg HPV.  2015 NIL pap.  9/14/18 NIL pap, + HR HPV (not 16 or 18). Plan cotest in 1 year  11/11/19 Lost to follow-up for pap tracking  3/5/20 NIL pap, +HR HPV (not 16/18). Plan: colposcopy  11/30/20 Lost to follow-up for pap tracking  12/30/21 [facility]US pap, + HR HPV (not 16 or  18). Plan: [place] bef 3/30/22   7/5/22 Lost to follow-up for pap tracking  23 ASCUS pap, + HR HPV (not 16 or 18). Plan: colp bef 24 LM for pt to call us back.   23 LM for pt to call back. Left detailed msg per pt request  24 COLP - cancelled  24 Reminder letter  24 [place] not done. Tracking updated for 6 mo colp/pap due 24.   24 Reminder letter  24 Reminder call - left message                Past Medical History:    Past Medical History:   Diagnosis Date    Abnormal Pap smear of cervix 2007    Adenomatous polyp of colon 2014    Cervical high risk HPV (human papillomavirus) test positive 07, 18    HSV-1 infection        Past Surgical History:    Past Surgical History:   Procedure Laterality Date    COLONOSCOPY  2014    Procedure: COMBINED COLONOSCOPY, SINGLE BIOPSY/POLYPECTOMY BY BIOPSY;  Surgeon: Mendoza Clifford MD;  Location:  GI    DILATION AND CURETTAGE, HYSTEROSCOPY, ABLATE ENDOMETRIUM NOVASURE, COMBINED  2012    Procedure: COMBINED DILATION AND CURETTAGE, HYSTEROSCOPY, ABLATE ENDOMETRIUM NOVASURE;  Hysteroscopy Dilation and Curettage, Novasure;  Surgeon: Bola Calhoun MD;  Location:  OR    NO HISTORY OF SURGERY         Family History:    Family History   Problem Relation Age of Onset    Family History Negative No family hx of        Social History:  Marital Status:   [2]  Social History     Tobacco Use    Smoking status: Former     Current packs/day: 0.00     Average packs/day: 0.5 packs/day for 20.0 years (10.0 ttl pk-yrs)     Types: Cigarettes     Start date: 1992     Quit date: 2012     Years since quittin.5    Smokeless tobacco: Never   Vaping Use    Vaping status: Never Used   Substance Use Topics    Alcohol use: Yes     Alcohol/week: 10.0 standard drinks of alcohol     Comment: glass of wine daily and few on weekends    Drug use: No        Medications:    calcium carbonate 600  mg-vitamin D 400 units (CALTRATE) 600-400 MG-UNIT per tablet  ibuprofen (ADVIL/MOTRIN) 800 MG tablet  NONFORMULARY  valACYclovir (VALTREX) 500 MG tablet          Review of Systems   All other systems reviewed and are negative.      Physical Exam   BP: (!) 154/100  Pulse: 94  Temp: 98.1  F (36.7  C)  Resp: 18  Weight: 56.7 kg (125 lb)  SpO2: 99 %      Physical Exam  Vitals and nursing note reviewed.   Constitutional:       General: She is not in acute distress.     Appearance: Normal appearance. She is well-developed.   HENT:      Head: Normocephalic and atraumatic.   Eyes:      General: No scleral icterus.     Conjunctiva/sclera: Conjunctivae normal.   Cardiovascular:      Rate and Rhythm: Normal rate and regular rhythm.   Pulmonary:      Effort: Pulmonary effort is normal. No respiratory distress.      Breath sounds: Normal breath sounds.   Abdominal:      General: There is distension.      Tenderness: There is guarding. There is no right CVA tenderness, left CVA tenderness or rebound.      Comments: Abdomen is mildly distended.  She has tenderness to palpation somewhat diffusely over the right and left mid abdomen and in the periumbilical area.  No hernias appreciated.   Musculoskeletal:      Cervical back: Normal range of motion and neck supple.   Skin:     General: Skin is warm and dry.      Findings: No rash.   Neurological:      Mental Status: She is alert and oriented to person, place, and time.         ED Course        Procedures           Results for orders placed or performed during the hospital encounter of 07/09/24 (from the past 24 hour(s))   CBC with platelets differential    Narrative    The following orders were created for panel order CBC with platelets differential.  Procedure                               Abnormality         Status                     ---------                               -----------         ------                     CBC with platelets and d...[[id number]]  Abnormal             Final result                 Please view results for these tests on the individual orders.   CBC with platelets and differential   Result Value Ref Range    WBC Count 14.1 (H) 4.0 - 11.0 10e3/uL    RBC Count 4.16 3.80 - 5.20 10e6/uL    Hemoglobin 13.1 11.7 - 15.7 g/dL    Hematocrit 39.4 35.0 - 47.0 %    MCV 95 78 - 100 fL    MCH 31.5 26.5 - 33.0 pg    MCHC 33.2 31.5 - 36.5 g/dL    RDW 13.4 10.0 - 15.0 %    Platelet Count 207 150 - 450 10e3/uL    % Neutrophils 81 %    % Lymphocytes 8 %    % Monocytes 10 %    % Eosinophils 1 %    % Basophils 0 %    % Immature Granulocytes 0 %    NRBCs per 100 WBC 0 <1 /100    Absolute Neutrophils 11.4 (H) 1.6 - 8.3 10e3/uL    Absolute Lymphocytes 1.1 0.8 - 5.3 10e3/uL    Absolute Monocytes 1.4 (H) 0.0 - 1.3 10e3/uL    Absolute Eosinophils 0.1 0.0 - 0.7 10e3/uL    Absolute Basophils 0.1 0.0 - 0.2 10e3/uL    Absolute Immature Granulocytes 0.1 <=0.4 10e3/uL    Absolute NRBCs 0.0 10e3/uL       Medications   sodium chloride 0.9% BOLUS 1,000 mL (1,000 mLs Intravenous $New Bag 7/9/24 1277)   CT Saline Flush 100mL (has no administration in time range)   iopamidol (ISOVUE-370) solution 500 mL (has no administration in time range)   ketorolac (TORADOL) injection 15 mg (15 mg Intravenous $Given 7/9/24 9943)       Assessments & Plan (with Medical Decision Making)  58-year-old with abdominal bloating and abdominal pain.  The pain is somewhat diffuse.  It is tender to palpation.  No vomiting at this time.  Per report had red blood cells in her urine previously.  IV established, IV fluids and Toradol ordered along with blood work.  After discussion of options we decided to proceed with CT scan with contrast given the severity of her pain.  She gingerly moves about the bed and was tender on palpation making ureterolithiasis less likely.  Patient does not have a ride home therefore she was not given stronger pain medicines.  White blood cell count is 14,000.  Its mostly neutrophils.  Patient was  signed over to Dr. Duenas at change of shift pending the rest of the workup including CMP, lipase, CT scan.     I have reviewed the nursing notes.    I have reviewed the findings, diagnosis, plan and need for follow up with the patient.          New Prescriptions    No medications on file       Final diagnoses:   Generalized abdominal pain       7/9/2024   [facility] EMERGENCY DEPT       Vin Aggarwal MD  07/09/24 1049

## 2024-07-10 NOTE — ED TRIAGE NOTES
Patient states that her stomach feels like a balloon in it. Pain started in the back and now in the front.

## 2024-07-10 NOTE — DISCHARGE INSTRUCTIONS
Your abdominal pain is due to a condition called diverticulitis.  You have inflammation to the colon and also a tiny area of perforation.    Your labs showed an increased white blood cell count and also blood in your urine.    I did recommend admission to the hospital for further monitoring and IV antibiotics.  However, I do think it is reasonable to try oral antibiotics as well given your particular situation.  Take the Augmentin as instructed until entirely gone.    Prescriptions for a nausea medicine and pain medicine were also sent to the pharmacy.  Use these as needed and drink plenty of fluids.  Avoid spicy/greasy foods.    Follow-up with your primary doctor as soon as possible for recheck.  You should also have a repeat urine test performed to check if there is still blood.    It is also very important that you return to the emergency department immediately for any new or worsening symptoms, particularly any severe pain, fevers, persistent vomiting.

## 2024-07-10 NOTE — ED PROVIDER NOTES
Patient was signed out to me at shift change.  Please see original note for details regarding presentation.  In brief, this is a 58-year-old female with no relevant medical history who presents for several days of abdominal pain.  She had diffuse tenderness on original exam.  Labs and CT are pending.    Labs returned significant for a leukocytosis of 14.1 with neutrophil predominance, unremarkable electrolytes and transaminases with mild bilirubin elevation.  Urinalysis confirmed the presence of hematuria and also had 7 WBCs, but no other clear signs of UTI.      Labs Ordered and Resulted from Time of ED Arrival to Time of ED Departure   COMPREHENSIVE METABOLIC PANEL - Abnormal       Result Value    Sodium 137      Potassium 4.3      Carbon Dioxide (CO2) 24      Anion Gap 16 (*)     Urea Nitrogen 8.8      Creatinine 0.66      GFR Estimate >90      Calcium 9.4      Chloride 97 (*)     Glucose 91      Alkaline Phosphatase 85      AST 24      ALT 17      Protein Total 7.1      Albumin 4.3      Bilirubin Total 1.4 (*)    ROUTINE UA WITH MICROSCOPIC REFLEX TO CULTURE - Abnormal    Color Urine Yellow      Appearance Urine Clear      Glucose Urine Negative      Bilirubin Urine Negative      Ketones Urine 80 (*)     Specific Gravity Urine 1.010      Blood Urine Large (*)     pH Urine 6.0      Protein Albumin Urine Negative      Urobilinogen Urine Normal      Nitrite Urine Negative      Leukocyte Esterase Urine Negative      Mucus Urine Present (*)     RBC Urine 12 (*)     WBC Urine 7 (*)     Squamous Epithelials Urine <1     CBC WITH PLATELETS AND DIFFERENTIAL - Abnormal    WBC Count 14.1 (*)     RBC Count 4.16      Hemoglobin 13.1      Hematocrit 39.4      MCV 95      MCH 31.5      MCHC 33.2      RDW 13.4      Platelet Count 207      % Neutrophils 81      % Lymphocytes 8      % Monocytes 10      % Eosinophils 1      % Basophils 0      % Immature Granulocytes 0      NRBCs per 100 WBC 0      Absolute Neutrophils 11.4 (*)      Absolute Lymphocytes 1.1      Absolute Monocytes 1.4 (*)     Absolute Eosinophils 0.1      Absolute Basophils 0.1      Absolute Immature Granulocytes 0.1      Absolute NRBCs 0.0     LIPASE - Normal    Lipase 18     URINE CULTURE     CT Abdomen Pelvis w Contrast   Final Result   IMPRESSION:    1.  Contained, minimally perforated acute diverticulitis of the proximal sigmoid colon. No evidence of abscess. Findings discussed verbally via telephone with Dr. Duenas at 11:55 AM on 7/9/2024. Colonoscopy follow-up is warranted, if not recently    performed, due to the associated wall thickening.    2.  Retroverted, fibroid uterus.   3.  Indeterminate 8 mm hyperenhancing lesion of the hepatic dome, possibly a flash filling hemangioma or focal nodular hyperplasia. Nonemergent liver MR imaging follow-up recommended.           0015: I was contacted by radiology regarding CT findings.  Patient has sigmoid diverticulitis with a tiny perforation, no signs of abscess or other complicating features.    Reviewed these findings with the patient.  Ordered Zosyn.  I recommended admission to the hospital for further IV antibiotics and monitoring.  She was in agreement with this plan, but unfortunately the hospital is at capacity and she would therefore require transfer.    0022: Spoke with Dr. Wilkes, general surgery.  He agreed that with evidence of small perforation, patient would ideally be admitted for further medical management and IV antibiotics.  However, given her benign exam and relatively mild symptoms, he has treated similar cases with oral antibiotics in the past.    I discussed this recommendation with the patient and recommended transfer to an outside hospital for further management.  However, patient very strongly preferred to attempt discharge with oral antibiotics.  She expressed clear understanding of the risks associated with this plan and we reviewed very strict return precautions, including any severe/worsening pain,  fever despite antibiotics, vomiting.  Separately, emphasized need for follow-up regarding persistent hematuria.  Patient will be discharged home with prescription for Augmentin, Zofran, oxycodone for breakthrough pain.  She will follow-up as soon as possible and agrees to return to the ED immediately for any new or worsening symptoms.         Anthony Duenas MD  07/10/24 0123

## 2024-07-10 NOTE — TELEPHONE ENCOUNTER
FYI to provider - Patient is lost to pap tracking follow-up. Attempts to contact pt have been made per reminder process and there has been no reply and/or no appt scheduled. Contact hx listed below.     8/22/07 [facility] pap, + HR HPV 53.  9/28/07 [place]- Negative  2008, 2009, 2012 NIL paps, Neg HPV.  2015 NIL pap.  9/14/18 NIL pap, + HR HPV (not 16 or 18). Plan cotest in 1 year  11/11/19 Lost to follow-up for pap tracking  3/5/20 NIL pap, +HR HPV (not 16/18). Plan: colposcopy  11/30/20 Lost to follow-up for pap tracking  12/30/21 [facility] pap, + HR HPV (not 16 or 18). Plan: [place] bef 3/30/22   7/5/22 Lost to follow-up for pap tracking  11/27/23 [facility] pap, + HR HPV (not 16 or 18). Plan: colp bef 2/27/24 12/8/23 LM for pt to call us back.   12/11/23 LM for pt to call back. Left detailed msg per pt request  2/23/24 COLP - cancelled  03/12/24 Reminder letter  4/8/24 [place] not done. Tracking updated for 6 mo colp/pap due 5/27/24.   5/8/24 Reminder letter  6/11/24 Reminder call - left message  7/10/24 Lost to follow-up for pap tracking     Debbie Martinez RN BSN, Pap Tracking

## 2024-07-11 LAB — BACTERIA UR CULT: NORMAL

## 2024-08-06 ENCOUNTER — TELEPHONE (OUTPATIENT)
Dept: FAMILY MEDICINE | Facility: CLINIC | Age: 59
End: 2024-08-06
Payer: COMMERCIAL

## 2024-08-06 NOTE — TELEPHONE ENCOUNTER
Reason for Call:  Appointment Request for Colposcopy     Patient requesting this type of appt: Procedure:colposcopy    Requested provider: unsure   Reason patient unable to be scheduled: Needs to be scheduled by clinic    When does patient want to be seen/preferred time:  Prefer woman Dr, Preferred not to have an appt on Monday or Tuesday    Comments:     Okay to leave a detailed message?: Yes at Cell number on file:    Telephone Information:   Mobile [phone]       Call taken on 8/6/2024 at 3:14 PM by Lissa Padilla

## 2024-08-08 NOTE — TELEPHONE ENCOUNTER
Scheduled appt for colposcopy with Dr. Evangelista on 8/29 at 3:10 check in time.  Called patient and left  message.

## 2024-08-14 ENCOUNTER — HOSPITAL ENCOUNTER (OUTPATIENT)
Dept: MAMMOGRAPHY | Facility: CLINIC | Age: 59
Discharge: HOME | End: 2024-08-14
Attending: NURSE PRACTITIONER | Admitting: NURSE PRACTITIONER
Payer: COMMERCIAL

## 2024-08-14 DIAGNOSIS — Z12.31 VISIT FOR SCREENING MAMMOGRAM: ICD-10-CM

## 2024-08-14 PROCEDURE — 77063 BREAST TOMOSYNTHESIS BI: CPT

## 2024-08-29 ENCOUNTER — OFFICE VISIT (OUTPATIENT)
Dept: FAMILY MEDICINE | Facility: CLINIC | Age: 59
End: 2024-08-29
Payer: COMMERCIAL

## 2024-08-29 VITALS
TEMPERATURE: 97.9 F | RESPIRATION RATE: 16 BRPM | OXYGEN SATURATION: 99 % | DIASTOLIC BLOOD PRESSURE: 86 MMHG | SYSTOLIC BLOOD PRESSURE: 153 MMHG | HEART RATE: 70 BPM

## 2024-08-29 DIAGNOSIS — Z12.4 CERVICAL CANCER SCREENING: Primary | ICD-10-CM

## 2024-08-29 DIAGNOSIS — R87.610 ASCUS WITH POSITIVE HIGH RISK HPV CERVICAL: ICD-10-CM

## 2024-08-29 DIAGNOSIS — R87.810 CERVICAL HIGH RISK HPV (HUMAN PAPILLOMAVIRUS) TEST POSITIVE: ICD-10-CM

## 2024-08-29 DIAGNOSIS — R87.619 ASCUS (ATYPICAL SQUAMOUS CELLS OF UNDETERMINED SIGNIFICANCE) ON GYNECOLOGIC PAPANICOLAOU SMEAR COMPLICATING PREGNANCY, ANTEPARTUM: ICD-10-CM

## 2024-08-29 DIAGNOSIS — R87.810 ASCUS WITH POSITIVE HIGH RISK HPV CERVICAL: ICD-10-CM

## 2024-08-29 DIAGNOSIS — O28.2 ASCUS (ATYPICAL SQUAMOUS CELLS OF UNDETERMINED SIGNIFICANCE) ON GYNECOLOGIC PAPANICOLAOU SMEAR COMPLICATING PREGNANCY, ANTEPARTUM: ICD-10-CM

## 2024-08-29 PROCEDURE — 88305 TISSUE EXAM BY PATHOLOGIST: CPT | Performed by: PATHOLOGY

## 2024-08-29 PROCEDURE — 88342 IMHCHEM/IMCYTCHM 1ST ANTB: CPT | Performed by: PATHOLOGY

## 2024-08-29 PROCEDURE — 57456 ENDOCERV CURETTAGE W/SCOPE: CPT | Performed by: FAMILY MEDICINE

## 2024-08-29 ASSESSMENT — PAIN SCALES - GENERAL: PAINLEVEL: NO PAIN (0)

## 2024-08-29 NOTE — PROGRESS NOTES
{PROVIDER CHARTING PREFERENCE:060021}    Csear Peters is a 58 year old, presenting for the following health issues:  Colposcopy        8/29/2024     3:28 PM   Additional Questions   Roomed by Regan BUSTAMANTE     {MA/LPN/RN Pre-Provider Visit Orders- hCG/UA/Strep (Optional):959902}  {SUPERLIST (Optional):688330}  {additonal problems for provider to add (Optional):826073}    {ROS Picklists (Optional):214751}      Objective    BP (!) 153/86   Pulse 70   Temp 97.9  F (36.6  C) (Temporal)   Resp 16   SpO2 99%   There is no height or weight on file to calculate BMI.  Physical Exam   {Exam List (Optional):998139}    {Diagnostic Test Results (Optional):266976}        Signed Electronically by: Deneen Evangelista MD  {Email feedback regarding this note to [email or web address]   :804730}

## 2024-08-29 NOTE — PROCEDURES
Indication:  Abnormal pap smear: ASCUS with positive HPV      Pre-procedure consultation was done and information about colposcopy was given.  She felt she was well informed and is ready to have the procedure done today.  The whole procedure was discussed with patient again today in detail.  Risk associated with the procedure was also discussed which include but not limitted to pain, infection, bleeding, as well as the possibility of not sampling affected regions.  Alternatives also discussed which include referral to GYN. Patient feels comfortable with the procedure and requests to have it done today.  All of her questions were answered.    Time out performed.  Patient identified times three.  Name of procedure and indications for the procedure to be done also identfied.    Patient was placed in a lithotomy position. The perenium was examed under the microscope with and without the green filter and it was normal.  No suspicious lesion noted.  A sterile speculum was then inserted in a usual manner and the cervix was fully visualized including full visualization of the SCJ.  The cervix was the exam under the microscope with and without the green filter, which showed no aceto white changes.  Vinegar solution applied to the cervix. No areas for biopsy were noted. Endocervical biopsy with curette performed and she tolerated this procedures well.      Pathology was sent. Bleeding controlled with pressure.  Patient toterated the procedure well. Post procedure care discussed and explained in detail.  Recommended nothing per Vagina x7d.  Educate symptoms to call in or to follow up, including fever, severe abdominal, heavy bleeding or if she has any concern.  Tylenol or Ibuprofen as needed for pain or cramping.  Call in with any questions or concerns.     Overall - satisfactory colposcopy.  Finding suggested mild cervical dysplasia.  She was instructed to follow up with pap smear 12 months from prior pap, depending on the  pathology report.  All of her questions were answered.

## 2024-08-29 NOTE — PROGRESS NOTES
Colposcopy     Lorraine is here today for colposcopy with possible cervical and endocervical biopsy.  Her last pap smear yes -  ASCUS  HPV Positive.  She was recommended for colposcopy with possible cervical and endocervical biopsy.  Stated that she was well informed about the procedure and has no concern about it today.  She has  no history of abnormal pap or HPV.  No vaginal discharge.  No LMP recorded. Patient has had an ablation.. No other concerns today.    Previous history of abnormal paps?: unknown  History of cryotherapy (freezing)?: no  History of veneral diseases: no  Do you desire testing for any of these diseases? no  History of genital warts:  no  Visible warts now?  no   Type of contraception: condom  History of sexual abuse:  Denied  Personal Hx of Cancer? NO  Family Hx of Cancer? NO  ROSALIO Exposure?  NO  Previous Hysterectomy?  No  Other Gyn Surgery? none  Partner(s) with warts?: No    Referring Physician:  ZAHEER Chua  Reason for Colposcopy: ASCUS HPV+  Marital status:    Abnormal bleeding? No  Abnormal discharge? No    Smoker:  y    Problem list and histories reviewed & adjusted, as indicated.  Additional history: as documented    Past Medical History:   Diagnosis Date    Abnormal Pap smear of cervix 08/22/2007    See problem list    Adenomatous polyp of colon 7/2014    q 5 yr colonoscopys    Cervical high risk HPV (human papillomavirus) test positive 8/22/07, 9/14/18    See problem list    HSV-1 infection     cold sores on lip       Past Surgical History:   Procedure Laterality Date    COLONOSCOPY  7/11/2014    Procedure: COMBINED COLONOSCOPY, SINGLE BIOPSY/POLYPECTOMY BY BIOPSY;  Surgeon: Mendoza Clifford MD;  Location:  GI    DILATION AND CURETTAGE, HYSTEROSCOPY, ABLATE ENDOMETRIUM NOVASURE, COMBINED  6/26/2012    Procedure: COMBINED DILATION AND CURETTAGE, HYSTEROSCOPY, ABLATE ENDOMETRIUM NOVASURE;  Hysteroscopy Dilation and Curettage, Novasure;  Surgeon: Bola Calhoun MD;   Location: [facility]    NO HISTORY OF SURGERY          Current Outpatient Medications   Medication Sig Dispense Refill    calcium carbonate 600 mg-vitamin D 400 units (CALTRATE) 600-400 MG-UNIT per tablet Take 1 tablet by mouth 2 times daily      ibuprofen (ADVIL/MOTRIN) 800 MG tablet TAKE 1 TABLET BY MOUTH EVERY 8 HOURS AS NEEDED FOR MODERATE PAIN 60 tablet 3    NONFORMULARY Vitamin D      valACYclovir (VALTREX) 500 MG tablet Take 1 tablet by mouth once daily 90 tablet 3    ondansetron (ZOFRAN ODT) 4 MG ODT tab Take 1 tablet (4 mg) by mouth every 8 hours as needed for nausea (Patient not taking: Reported on 8/29/2024) 10 tablet 0    oxyCODONE (ROXICODONE) 5 MG tablet Take 1 tablet (5 mg) by mouth every 6 hours as needed for severe pain (Patient not taking: Reported on 8/29/2024) 6 tablet 0        No Known Allergies    ROS:  Constitutional, HEENT, cardiovascular, pulmonary, gi and gu systems are negative, except as otherwise noted.    OBJECTIVE:                                                      BP (!) 153/86   Pulse 70   Temp 97.9  F (36.6  C) (Temporal)   Resp 16   SpO2 99%     GENERAL: healthy, alert and no distress   RESP: normal WOB  CV: 2+ peripheral pulses.   (female): normal female external genitalia, vaginal mucosa.      Diagnostic Test Results:    No results found for any visits on 08/29/24.       ASSESSMENT/PLAN:                                                        ICD-10-CM    1. Cervical cancer screening  Z12.4 Colposcopy, with curettage      2. ASCUS (atypical squamous cells of undetermined significance) on gynecologic Papanicolaou smear complicating pregnancy, antepartum  O28.2 Colposcopy, with curettage    R87.619       3. Cervical high risk HPV (human papillomavirus) test positive  R87.810 Colposcopy, with curettage      4. ASCUS with positive high risk HPV cervical  R87.610 Colposcopy, with curettage    R87.810           Recent pap showed she has ASCUS with + high risk HPV.   I reviewed the  medical record closely and discussed with her about the significance of the findings.  I agreed with the recommendations of colposcopy with possible cervical biopsy.  Discussed with her in details colposcopy procedure and please see the procedural note for further details.  Also discussed about post procedural cares.  She was also educated about symptoms to call in or be seen.  All of her questions were answered.

## 2024-09-04 LAB
PATH REPORT.COMMENTS IMP SPEC: NORMAL
PATH REPORT.FINAL DX SPEC: NORMAL
PATH REPORT.GROSS SPEC: NORMAL
PATH REPORT.MICROSCOPIC SPEC OTHER STN: NORMAL
PATH REPORT.RELEVANT HX SPEC: NORMAL
PHOTO IMAGE: NORMAL

## 2024-10-28 ENCOUNTER — PATIENT OUTREACH (OUTPATIENT)
Dept: CARE COORDINATION | Facility: CLINIC | Age: 59
End: 2024-10-28
Payer: COMMERCIAL

## 2024-11-11 ENCOUNTER — PATIENT OUTREACH (OUTPATIENT)
Dept: CARE COORDINATION | Facility: CLINIC | Age: 59
End: 2024-11-11
Payer: COMMERCIAL

## 2024-11-27 ENCOUNTER — PATIENT OUTREACH (OUTPATIENT)
Dept: GASTROENTEROLOGY | Facility: CLINIC | Age: 59
End: 2024-11-27
Payer: COMMERCIAL

## 2024-11-27 DIAGNOSIS — Z12.11 SPECIAL SCREENING FOR MALIGNANT NEOPLASMS, COLON: Primary | ICD-10-CM

## 2024-11-27 NOTE — PROGRESS NOTES
"Hx adenomatous polyps with 5yr recall recommended on last colonoscopy performed in 2014    [facility] Screening Colonoscopy Referral Review    Patient meets the inclusion criteria for screening colonoscopy standing order.    Ordering/Referring Provider:  Megan Chua      BMI: Estimated body mass index is 22.14 kg/m  as calculated from the following:    Height as of 2/21/24: 1.6 m (5' 3\").    Weight as of 7/9/24: 56.7 kg (125 lb).     Sedation:  Does patient have any of the following conditions affecting sedation?  No medical conditions affecting sedation.    Previous Scopes:  Any previous recommendations or follow up needs based on previous scope?  na / No recommendations.    Medical Concerns to Postpone Order:  Does patient have any of the following medical concerns that should postpone/delay colonoscopy referral?  No medical conditions affecting colonoscopy referral.    Final Referral Details:  Based on patient's medical history patient is appropriate for referral order with moderate sedation. If patient's BMI > 50 do not schedule in [facility].  "

## 2024-11-27 NOTE — LETTER
November 27, 2024      Lorraine Hunter  [address] [place] [id number]              Alberto Peters,    We hope this message  finds you doing well.     Your health is important to us at [facility]. Our records indicate that you could be due, or possibly overdue, for a screening or surveillance colonoscopy if you have not had a colonoscopy since 2014.     An order has been placed for you to have this completed. Our scheduling team will be reaching out to you to assist in getting this scheduled. If you do not hear from them within 7 days or you would like to schedule sooner, please call [phone], option 2 for endoscopy scheduling.     If you believe this is in error and have already had a colonoscopy done, please have your results and recommendations faxed to [phone].    If you have questions about this order or have further concerns, please reach out to your primary care provider.     Kurt     Colorectal Cancer RN Screening Team  [facility]

## 2024-12-09 NOTE — PROGRESS NOTES
[facility] Dermatology Note  Encounter Date: Dec 12, 2024  Office Visit     Reviewed patients past medical history and pertinent chart review prior to patients visit today.     Dermatology Problem List:    History of atypical nevus  - Right lower back, junctional nevus with moderate atypia s/p shave bx confirmed 01/27/22, never scheduled re-excision     Family Hx: No family history of skin cancer  Personal Hx: No personal history of skin cancer   ____________________________________________    Assessment & Plan:     # benign appearing nevi  The ABCDE criteria for melanoma was reviewed with the patient. None of the patient's nevi reach the clinical threshold for biopsy. I recommend continued sun protection, self-skin examinations and observation. Should any of the patient's nevi change in size, color, texture or shape or develop symptoms such as itching or bleeding, I recommend an immediate return visit for reassessment.       Follow up: 1-2 months for FBSC and reevaluation of spot on the right lower back (previous bx site)      Amena Oliver PA-C  [facility]  Dermatology    _______________________________________    CC: Derm Problem (Spot check: two spots on left side of nose/ medial cheek. Has had for 2+ years. No bleeding, crusty, or nonhealing. )    HPI:  Ms. Lorraine Hunter is a(n) 58 year old female who presents today as a new patient for evaluation of a lesion of concern on the left medial cheek. These are new over the past several months. These lesions are asymptomatic. She politely declines a full body skin examination.     Patient is otherwise feeling well, without additional skin concerns.      Physical Exam:  SKIN: Focused examination of the face was performed.  - left medial cheek, flesh colored papule x2    - No other lesions of concern on areas examined.     Medications:  Current Outpatient Medications   Medication Sig Dispense Refill    calcium carbonate 600 mg-vitamin  D 400 units (CALTRATE) 600-400 MG-UNIT per tablet Take 1 tablet by mouth 2 times daily      ibuprofen (ADVIL/MOTRIN) 800 MG tablet TAKE 1 TABLET BY MOUTH EVERY 8 HOURS AS NEEDED FOR MODERATE PAIN 60 tablet 3    NONFORMULARY Vitamin D      valACYclovir (VALTREX) 500 MG tablet Take 1 tablet by mouth once daily 90 tablet 3     No current facility-administered medications for this visit.      Past Medical History:   Patient Active Problem List   Diagnosis    Diffuse cystic mastopathy    CARDIOVASCULAR SCREENING; LDL GOAL LESS THAN 160    Adenomatous polyp of colon    History of cold sores    Cervical high risk HPV (human papillomavirus) test positive     Past Medical History:   Diagnosis Date    Abnormal Pap smear of cervix 08/22/2007    See problem list    Adenomatous polyp of colon 7/2014    q 5 yr colonoscopys    Cervical high risk HPV (human papillomavirus) test positive 8/22/07, 9/14/18    See problem list    HSV-1 infection     cold sores on lip       CC Referred Self, MD  No address on file on close of this encounter.

## 2024-12-09 NOTE — PATIENT INSTRUCTIONS
Patient Education       Proper skin care from [facility]:    -Eliminate harsh soaps as they strip the natural oils from the skin, often resulting in dry itchy skin ( i.e. Dial, Zest, Polish Spring)  -Use mild soaps such as Cetaphil or Dove Sensitive Skin in the shower. You do not need to use soap on arms, legs, and trunk every time you shower unless visibly soiled.   -Avoid hot or cold showers.  -After showering, lightly dry off and apply moisturizing within 2-3 minutes. This will help trap moisture in the skin.   -Aggressive use of a moisturizer at least 1-2 times a day to the entire body (including -Vanicream, Cetaphil, Aquaphor or Cerave) and moisturize hands after every washing.  -We recommend using moisturizers that come in a tub that needs to be scooped out, not a pump. This has more of an oil base. It will hold moisture in your skin much better than a water base moisturizer. The above recommended are non-pore clogging.      Wear a sunscreen with at least SPF 30 on your face, ears, neck and V of the chest daily. Wear sunscreen on other areas of the body if those areas are exposed to the sun throughout the day. Sunscreens can contain physical and/or chemical blockers. Physical blockers are less likely to clog pores, these include zinc oxide and titanium dioxide. Reapply every two hour and after swimming.     Sunscreen examples: https://www.ewg.org/sunscreen/    UV radiation  UVA radiation remains constant throughout the day and throughout the year. It is a longer wavelength than UVB and therefore penetrates deeper into the skin leading to immediate and delayed tanning, photoaging, and skin cancer. 70-80% of UVA and UVB radiation occurs between the hours of 10am-2pm.  UVB radiation  UVB radiation causes the most harmful effects and is more significant during the summer months. However, snow and ice can reflect UVB radiation leading to skin damage during the winter months as well. UVB radiation is  responsible for tanning, burning, inflammation, delayed erythema (pinkness), pigmentation (brown spots), and skin cancer.     I recommend self monthly full body exams and yearly full body exams with a dermatology provider. If you develop a new or changing lesion please follow up for examination. Most skin cancers are pink and scaly or pink and pearly. However, we do see blue/brown/black skin cancers.  Consider the ABCDEs of melanoma when giving yourself your monthly full body exam ( don't forget the groin, buttocks, feet, toes, etc). A-asymmetry, B-borders, C-color, D-diameter, E-elevation or evolving. If you see any of these changes please follow up in clinic. If you cannot see your back I recommend purchasing a hand held mirror to use with a larger wall mirror.       Checking for Skin Cancer  You can find cancer early by checking your skin each month. There are 3 kinds of skin cancer. They are melanoma, basal cell carcinoma, and squamous cell carcinoma. Doing monthly skin checks is the best way to find new marks or skin changes. Follow the instructions below for checking your skin.   The ABCDEs of checking moles for melanoma   Check your moles or growths for signs of melanoma using ABCDE:   Asymmetry: the sides of the mole or growth don t match  Border: the edges are ragged, notched, or blurred  Color: the color within the mole or growth varies  Diameter: the mole or growth is larger than 6 mm (size of a pencil eraser)  Evolving: the size, shape, or color of the mole or growth is changing (evolving is not shown in the images below)    Checking for other types of skin cancer  Basal cell carcinoma or squamous cell carcinoma have symptoms such as:     A spot or mole that looks different from all other marks on your skin  Changes in how an area feels, such as itching, tenderness, or pain  Changes in the skin's surface, such as oozing, bleeding, or scaliness  A sore that does not heal  New swelling or redness beyond  the border of a mole    Who s at risk?  Anyone can get skin cancer. But you are at greater risk if you have:   Fair skin, light-colored hair, or light-colored eyes  Many moles or abnormal moles on your skin  A history of sunburns from sunlight or tanning beds  A family history of skin cancer  A history of exposure to radiation or chemicals  A weakened immune system  If you have had skin cancer in the past, you are at risk for recurring skin cancer.   How to check your skin  Do your monthly skin checkups in front of a full-length mirror. Check all parts of your body, including your:   Head (ears, face, neck, and scalp)  Torso (front, back, and sides)  Arms (tops, undersides, upper, and lower armpits)  Hands (palms, backs, and fingers, including under the nails)  Buttocks and genitals  Legs (front, back, and sides)  Feet (tops, soles, toes, including under the nails, and between toes)  If you have a lot of moles, take digital photos of them each month. Make sure to take photos both up close and from a distance. These can help you see if any moles change over time.   Most skin changes are not cancer. But if you see any changes in your skin, call your doctor right away. Only he or she can diagnose a problem. If you have skin cancer, seeing your doctor can be the first step toward getting the treatment that could save your life.   [employer] last reviewed this educational content on 4/1/2019 2000-2020 [employer]. [address] [place] [address], [place] [address]. All rights reserved. This information is not intended as a substitute for professional medical care. Always follow your healthcare professional's instructions.       When should I call my doctor?  If you are worsening or not improving, please, contact us or seek urgent care as noted below.     Who should I call with questions (adults)?  [facility] (adult and pediatric): [phone]  [facility]  [place] (adult): [phone]  [facility] ([place], [place], [place] and [place]) [phone]  For urgent needs outside of business hours call the [facility] at [phone] and ask for the dermatology resident on call to be paged  If this is a medical emergency and you are unable to reach an ER, Call 911      If you need a prescription refill, please contact your pharmacy. Refills are approved or denied by our Physicians during normal business hours, Monday through Fridays  Per office policy, refills will not be granted if you have not been seen within the past year (or sooner depending on your child's condition The ABCDEs of Melanoma    Skin cancer can develop anywhere on the skin. Ask someone for help when checking your skin, especially in hard to see places. If you notice a mole different from others, or that changes, enlarges, itches, or bleeds (even if it is small), you should see a dermatologist.

## 2024-12-12 ENCOUNTER — OFFICE VISIT (OUTPATIENT)
Dept: DERMATOLOGY | Facility: CLINIC | Age: 59
End: 2024-12-12
Payer: COMMERCIAL

## 2024-12-12 DIAGNOSIS — D22.9 NEVUS: Primary | ICD-10-CM

## 2024-12-12 ASSESSMENT — PAIN SCALES - GENERAL: PAINLEVEL_OUTOF10: NO PAIN (0)

## 2024-12-12 NOTE — NURSING NOTE
Asked patient if she would like a full body skin check at todays visit and she declined.     Called and left  for patient to call us back to set up a full body skin check as she had an abnormal mole on her right lower back that should be rechecked.     Callie Avila RN on 12/12/2024 at 2:49 PM

## 2024-12-12 NOTE — LETTER
12/12/2024      Lorraine Hunter  [address] [place] [id number]      Dear Colleague,    Thank you for referring your patient, Lorraine Hunter, to the [facility]. Please see a copy of my visit note below.    [facility] Dermatology Note  Encounter Date: Dec 12, 2024  Office Visit     Reviewed patients past medical history and pertinent chart review prior to patients visit today.     Dermatology Problem List:    History of atypical nevus  - Right lower back, junctional nevus with moderate atypia s/p shave bx confirmed 01/27/22, never scheduled re-excision     Family Hx: No family history of skin cancer  Personal Hx: No personal history of skin cancer   ____________________________________________    Assessment & Plan:     # benign appearing nevi  The ABCDE criteria for melanoma was reviewed with the patient. None of the patient's nevi reach the clinical threshold for biopsy. I recommend continued sun protection, self-skin examinations and observation. Should any of the patient's nevi change in size, color, texture or shape or develop symptoms such as itching or bleeding, I recommend an immediate return visit for reassessment.       Follow up: 1-2 months for FBSC and reevaluation of spot on the right lower back (previous bx site)      Amena Oliver PA-C  [facility]  Dermatology    _______________________________________    CC: Derm Problem (Spot check: two spots on left side of nose/ medial cheek. Has had for 2+ years. No bleeding, crusty, or nonhealing. )    HPI:  Ms. Lorraine Hunter is a(n) 58 year old female who presents today as a new patient for evaluation of a lesion of concern on the left medial cheek. These are new over the past several months. These lesions are asymptomatic. She politely declines a full body skin examination.     Patient is otherwise feeling well, without additional skin concerns.      Physical Exam:  SKIN: Focused examination of the  face was performed.  - left medial cheek, flesh colored papule x2    - No other lesions of concern on areas examined.     Medications:  Current Outpatient Medications   Medication Sig Dispense Refill     calcium carbonate 600 mg-vitamin D 400 units (CALTRATE) 600-400 MG-UNIT per tablet Take 1 tablet by mouth 2 times daily       ibuprofen (ADVIL/MOTRIN) 800 MG tablet TAKE 1 TABLET BY MOUTH EVERY 8 HOURS AS NEEDED FOR MODERATE PAIN 60 tablet 3     NONFORMULARY Vitamin D       valACYclovir (VALTREX) 500 MG tablet Take 1 tablet by mouth once daily 90 tablet 3     No current facility-administered medications for this visit.      Past Medical History:   Patient Active Problem List   Diagnosis     Diffuse cystic mastopathy     CARDIOVASCULAR SCREENING; LDL GOAL LESS THAN 160     Adenomatous polyp of colon     History of cold sores     Cervical high risk HPV (human papillomavirus) test positive     Past Medical History:   Diagnosis Date     Abnormal Pap smear of cervix 08/22/2007    See problem list     Adenomatous polyp of colon 7/2014    q 5 yr colonoscopys     Cervical high risk HPV (human papillomavirus) test positive 8/22/07, 9/14/18    See problem list     HSV-1 infection     cold sores on lip       CC Referred Self, MD  No address on file on close of this encounter.       Again, thank you for allowing me to participate in the care of your patient.        Sincerely,        Amena Oliver PA-C

## 2024-12-12 NOTE — NURSING NOTE
Lorraine Hunter's chief complaint for this visit includes:  Chief Complaint   Patient presents with    Derm Problem     Spot check: two spots on left side of nose/ medial cheek. Has had for 2+ years. No bleeding, crusty, or nonhealing.      PCP: Megan Chua    Referring Provider:  Referred Self, MD  No address on file    There were no vitals taken for this visit.  No Pain (0)      No Known Allergies      Do you need any medication refills at today's visit?   No.      Callie Avila RN on 12/12/2024 at 2:17 PM

## 2025-05-26 ENCOUNTER — PATIENT OUTREACH (OUTPATIENT)
Dept: CARE COORDINATION | Facility: CLINIC | Age: 60
End: 2025-05-26
Payer: COMMERCIAL